# Patient Record
Sex: FEMALE | Race: WHITE | HISPANIC OR LATINO | Employment: PART TIME | URBAN - METROPOLITAN AREA
[De-identification: names, ages, dates, MRNs, and addresses within clinical notes are randomized per-mention and may not be internally consistent; named-entity substitution may affect disease eponyms.]

---

## 2017-02-06 ENCOUNTER — NON-PROVIDER VISIT (OUTPATIENT)
Dept: OBGYN | Facility: CLINIC | Age: 42
End: 2017-02-06

## 2017-02-06 DIAGNOSIS — Z32.01 PREGNANCY EXAMINATION OR TEST, POSITIVE RESULT: ICD-10-CM

## 2017-02-06 LAB
INT CON NEG: NEGATIVE
INT CON POS: POSITIVE
POC URINE PREGNANCY TEST: POSITIVE

## 2017-02-06 PROCEDURE — 81025 URINE PREGNANCY TEST: CPT | Performed by: OBSTETRICS & GYNECOLOGY

## 2017-02-14 ENCOUNTER — APPOINTMENT (OUTPATIENT)
Dept: OBGYN | Facility: CLINIC | Age: 42
End: 2017-02-14

## 2017-03-15 ENCOUNTER — APPOINTMENT (OUTPATIENT)
Dept: LAB | Facility: MEDICAL CENTER | Age: 42
End: 2017-03-15
Attending: NURSE PRACTITIONER

## 2017-03-15 ENCOUNTER — HOSPITAL ENCOUNTER (OUTPATIENT)
Facility: MEDICAL CENTER | Age: 42
End: 2017-03-15
Attending: NURSE PRACTITIONER
Payer: COMMERCIAL

## 2017-03-15 ENCOUNTER — INITIAL PRENATAL (OUTPATIENT)
Dept: OBGYN | Facility: CLINIC | Age: 42
End: 2017-03-15

## 2017-03-15 VITALS
DIASTOLIC BLOOD PRESSURE: 72 MMHG | HEIGHT: 62 IN | WEIGHT: 152 LBS | SYSTOLIC BLOOD PRESSURE: 140 MMHG | BODY MASS INDEX: 27.97 KG/M2

## 2017-03-15 DIAGNOSIS — Z34.82 PRENATAL CARE, SUBSEQUENT PREGNANCY, SECOND TRIMESTER: ICD-10-CM

## 2017-03-15 DIAGNOSIS — O09.522 ELDERLY MULTIGRAVIDA IN SECOND TRIMESTER: ICD-10-CM

## 2017-03-15 DIAGNOSIS — O09.292 HISTORY OF MACROSOMIA IN INFANT IN PRIOR PREGNANCY, CURRENTLY PREGNANT IN SECOND TRIMESTER: ICD-10-CM

## 2017-03-15 DIAGNOSIS — G43.009 MIGRAINE WITHOUT AURA AND WITHOUT STATUS MIGRAINOSUS, NOT INTRACTABLE: ICD-10-CM

## 2017-03-15 DIAGNOSIS — R32 INCONTINENCE: ICD-10-CM

## 2017-03-15 LAB
APPEARANCE UR: NORMAL
BILIRUB UR STRIP-MCNC: NORMAL MG/DL
COLOR UR AUTO: NORMAL
GLUCOSE UR STRIP.AUTO-MCNC: NORMAL MG/DL
KETONES UR STRIP.AUTO-MCNC: NORMAL MG/DL
LEUKOCYTE ESTERASE UR QL STRIP.AUTO: NORMAL
NITRITE UR QL STRIP.AUTO: NORMAL
PH UR STRIP.AUTO: 8 [PH] (ref 5–8)
PROT UR QL STRIP: NORMAL MG/DL
RBC UR QL AUTO: NORMAL
SP GR UR STRIP.AUTO: 1
UROBILINOGEN UR STRIP-MCNC: NORMAL MG/DL

## 2017-03-15 PROCEDURE — 8199 PREG CTR HIGH RISK PKG RATE (SYSTEM): Performed by: NURSE PRACTITIONER

## 2017-03-15 PROCEDURE — 59402 PR NEW OB HIGH RISK: CPT | Performed by: NURSE PRACTITIONER

## 2017-03-15 PROCEDURE — 81002 URINALYSIS NONAUTO W/O SCOPE: CPT | Performed by: NURSE PRACTITIONER

## 2017-03-15 NOTE — MR AVS SNAPSHOT
"Amada Nicoleuz ToddWilson   3/15/2017 1:30 PM   Initial Prenatal   MRN: [id number]    [facility]   Dept Phone:  [phone]    Description:  Female : 1975   Provider:  Eveline Barrett D.N.P.; [facility] INTAKE           Allergies as of 3/15/2017     Not on File      You were diagnosed with     Prenatal care, subsequent pregnancy, second trimester   [700550]       Elderly multigravida in second trimester   [8181056]       History of macrosomia in infant in prior pregnancy, currently pregnant in second trimester   [9260900]       Incontinence   [042908]       Migraine without aura and without status migrainosus, not intractable   [105975]       Elevated BP   [465968]         Vital Signs     Blood Pressure Height Weight Body Mass Index Last Menstrual Period Smoking Status    140/72 mmHg 1.562 m (5' 1.5\") 68.947 kg (152 lb) 28.26 kg/m2 2016 (Exact Date) Never Smoker       Basic Information     Date Of Birth Sex Race Ethnicity Preferred Language    1975 Female Unable to Obtain Unknown Vincentian      Your appointments     2017  2:00 PM   OB Follow Up with Eveline Barrett D.N.P.   [facility] [address] [address] [phone]              Problem List              ICD-10-CM Priority Class Noted - Resolved    Elderly multigravida in second trimester O09.522   3/15/2017 - Present    History of macrosomia in infant in prior pregnancy, currently pregnant in second trimester O09.292   3/15/2017 - Present    Incontinence R32   3/15/2017 - Present    Migraine without aura G43.009   3/15/2017 - Present      Health Maintenance     Patient has no pending health maintenance at this time      Results     POCT Urinalysis      Component Value Standard Range & Units    POC Color  Negative    POC Appearance  Negative    POC Leukocyte Esterase neg Negative    POC Nitrites neg Negative    POC Urobiligen  Negative (0.2) mg/dL    POC Protein neg " Negative mg/dL    POC Urine PH 8.0 5.0 - 8.0    POC Blood neg Negative    POC Specific Gravity 1.005 <1.005 - >1.030    POC Ketones small Negative mg/dL    POC Biliruben  Negative mg/dL    POC Glucose neg Negative mg/dL                        Current Immunizations     No immunizations on file.      Below and/or attached are the medications your provider expects you to take. Review all of your home medications and newly ordered medications with your provider and/or pharmacist. Follow medication instructions as directed by your provider and/or pharmacist. Please keep your medication list with you and share with your provider. Update the information when medications are discontinued, doses are changed, or new medications (including over-the-counter products) are added; and carry medication information at all times in the event of emergency situations     Allergies:  No Known Allergies          Medications  Valid as of: March 15, 2017 -  2:27 PM    Generic Name Brand Name Tablet Size Instructions for use    albuterol (PROVENTIL) 2.5 mg/0.5 mL Nebu Soln (Nebu Soln) PROVENTIL 2.5 mg/0.5 mL by Nebulization route ONCE (RT).        Prenatal Multivit-Min-Fe-FA   Take  by mouth.        .                 Medicines prescribed today were sent to:     [id number] - [place] - [address]    [address] [address]    Phone: [phone] Fax: [phone]    Open 24 Hours?: No      Medication refill instructions:       If your prescription bottle indicates you have medication refills left, it is not necessary to call your provider’s office. Please contact your pharmacy and they will refill your medication.    If your prescription bottle indicates you do not have any refills left, you may request refills at any time through one of the following ways: The online [employer] system (except Urgent Care), by calling your provider’s office, or by asking your pharmacy to contact your provider’s office with a refill request.  Medication refills are processed only during regular business hours and may not be available until the next business day. Your provider may request additional information or to have a follow-up visit with you prior to refilling your medication.   *Please Note: Medication refills are assigned a new Rx number when refilled electronically. Your pharmacy may indicate that no refills were authorized even though a new prescription for the same medication is available at the pharmacy. Please request the medicine by name with the pharmacy before contacting your provider for a refill.        Your To Do List     Future Labs/Procedures Complete By Expires    AFP TETRA  As directed 3/15/2018    GLUCOSE 1HR GESTATIONAL  As directed 3/15/2018    PREG CNTR PRENATAL PN  As directed 3/15/2018    PREGNANCY INDUCED HYPERTENSION PROFILE-CBC W/O, BUN, CREATININE, AST, URIC ACID  As directed 3/15/2018    THINPREP RFLX HPV ASCUS W/CTNG  As directed 3/15/2018    URINETOTAL PROTEIN 24 HR  As directed 3/15/2018    US-OB 2ND 3RD TRI COMPLETE  As directed 3/15/2018         MyChart Status: Patient Declined

## 2017-03-15 NOTE — Clinical Note
March 15, 2017              Amada Meneses is currently being cared for at [facility].  Her hours/activities need to be modified.  She should not lift over 20 pounds repetitively. She must have frequent rest periods and be able to drink water to stay hydrated.           Thank you,          Eveline Barrett D.N.P.    Electronically Signed

## 2017-03-15 NOTE — PROGRESS NOTES
S:  Amada Meneses is a 42 y.o.  who presents for her new OB exam.  She is 17w4d with and RAMEZ of Estimated Date of Delivery: 17 by LMP. She c/o very embarrassing leaking of urine when she coughs/sneezes or lifts anything. Has never sought care for it. She states she has migraines but did not know if tylenol was safe. She has an albuterol inhaler she got from [facility], unclear if she has allergies or asthma.  She is currently working as a  and wishes for a work letter for no heavy lifting. No ER visits or previous care in this pregnancy.     desires AFP.  declines CF.  Denies VB, LOF, or cramping.  Denies dysuria, vaginal DC. Reports positive fetal movement.     Pt is single and lives with Fob.  Pregnancy is desired.      Past Medical History   Diagnosis Date   • Asthma 2016     albuterol inhaler 2 times a day   • Migraine      no medications     History reviewed. No pertinent family history.  Social History     Social History   • Marital Status: Single     Spouse Name: N/A   • Number of Children: N/A   • Years of Education: N/A     Occupational History   • Not on file.     Social History Main Topics   • Smoking status: Never Smoker    • Smokeless tobacco: Never Used   • Alcohol Use: No   • Drug Use: No   • Sexual Activity:     Partners: Male      Comment: none. Planned pregnancy.      Other Topics Concern   • Not on file     Social History Narrative   • No narrative on file     OB History    Para Term  AB SAB TAB Ectopic Multiple Living   5 3 3  1 1    2      # Outcome Date GA Lbr Shaun/2nd Weight Sex Delivery Anes PTL Lv   5 Current            4 Term 10/29/11 40w0d  2.268 kg (5 lb) F Vag-Spont None N    3 SAB 2010 12w0d             Comments: D&C   2 Term 01 40w0d  3.175 kg (7 lb) M Vag-Spont None N Y   1 Term 96 40w0d  4.082 kg (9 lb) F Vag-Spont EPI N Y        History of HSV I or II in self or partner: no  History of Thyroid problems: no    O:   "  Filed Vitals:    03/15/17 1354   BP: 140/72   Height: 1.562 m (5' 1.5\")   Weight: 68.947 kg (152 lb)      See Prenatal Physical.    Wet mount: none      A:   1.  IUP @ 17w4d per LMP        2.  S=D        3.  See problem list below        4.  Elevated BP       Patient Active Problem List    Diagnosis Date Noted   • Elderly multigravida in second trimester 03/15/2017   • History of macrosomia in infant in prior pregnancy, currently pregnant in second trimester 03/15/2017   • Incontinence 03/15/2017   • Migraine without aura 03/15/2017         P:  1.  GC/CT & pap done        2.  Prenatal labs ordered - lab slip given        3.  Discussed PNV, diet, avoidances and adequate water intake        4.  NOB packet given        5.  Return to office in 3 wks        6.  Complete OB US first available.         7.  Include labs for elevated BP.         8.  Kaegels untill after birth then gyn referral for incontinence.         9.  Early glucose for macrosomia    No orders of the defined types were placed in this encounter.       "

## 2017-03-15 NOTE — PROGRESS NOTES
Pt here for NOB visit  C/o constant headaches states she drinks about 4 bottles of water a day because she thinks her bladder is too low because she pees her self. Denies any other complaints  Pt was told in [facility] she has asthma was given albuterol inhaler but is unsure if it is safe for baby.  Desires AFP  Declines CF  Desires BTL  Pharmacy verified  Phone [phone]

## 2017-03-16 LAB
C TRACH DNA GENITAL QL NAA+PROBE: NEGATIVE
CYTOLOGY REG CYTOL: NORMAL
N GONORRHOEA DNA GENITAL QL NAA+PROBE: NEGATIVE
SPECIMEN SOURCE: NORMAL

## 2017-03-17 ENCOUNTER — HOSPITAL ENCOUNTER (OUTPATIENT)
Dept: LAB | Facility: MEDICAL CENTER | Age: 42
End: 2017-03-17
Attending: NURSE PRACTITIONER
Payer: COMMERCIAL

## 2017-03-17 DIAGNOSIS — Z34.82 PRENATAL CARE, SUBSEQUENT PREGNANCY, SECOND TRIMESTER: ICD-10-CM

## 2017-03-17 DIAGNOSIS — O09.292 HISTORY OF MACROSOMIA IN INFANT IN PRIOR PREGNANCY, CURRENTLY PREGNANT IN SECOND TRIMESTER: ICD-10-CM

## 2017-03-17 LAB
ABO GROUP BLD: NORMAL
AMORPHOUS CRYSTALS 1764: PRESENT /HPF
APPEARANCE UR: ABNORMAL
AST SERPL-CCNC: 27 U/L (ref 12–45)
BASOPHILS # BLD AUTO: 0.03 K/UL (ref 0–0.12)
BASOPHILS NFR BLD AUTO: 0.3 % (ref 0–1.8)
BILIRUB UR QL STRIP.AUTO: NEGATIVE
BLD GP AB SCN SERPL QL: NORMAL
BUN SERPL-MCNC: 7 MG/DL (ref 8–22)
COLOR UR AUTO: YELLOW
CREAT SERPL-MCNC: 0.69 MG/DL (ref 0.5–1.4)
CULTURE IF INDICATED INDCX: NO UA CULTURE
EOSINOPHIL # BLD: 0.12 K/UL (ref 0–0.51)
EOSINOPHIL NFR BLD AUTO: 1.4 % (ref 0–6.9)
EPITHELIAL CELLS 1715: ABNORMAL /HPF
ERYTHROCYTE [DISTWIDTH] IN BLOOD BY AUTOMATED COUNT: 45.1 FL (ref 35.9–50)
ERYTHROCYTE [DISTWIDTH] IN BLOOD BY AUTOMATED COUNT: 46.1 FL (ref 35.9–50)
GLUCOSE 1H P 50 G GLC PO SERPL-MCNC: 129 MG/DL (ref 70–139)
GLUCOSE UR STRIP.AUTO-MCNC: NEGATIVE MG/DL
HBV SURFACE AG SERPL QL IA: NEGATIVE
HCT VFR BLD AUTO: 35.8 % (ref 37–47)
HCT VFR BLD AUTO: 36.2 % (ref 37–47)
HGB BLD-MCNC: 12 G/DL (ref 12–16)
HGB BLD-MCNC: 12 G/DL (ref 12–16)
HIV 1+2 AB+HIV1 P24 AG SERPL QL IA: NON REACTIVE
IMM GRANULOCYTES # BLD AUTO: 0.12 K/UL (ref 0–0.11)
IMM GRANULOCYTES NFR BLD AUTO: 1.4 % (ref 0–0.9)
KETONES UR STRIP.AUTO-MCNC: NEGATIVE MG/DL
LEUKOCYTE ESTERASE UR QL STRIP.AUTO: NEGATIVE
LYMPHOCYTES # BLD: 1.45 K/UL (ref 1–4.8)
LYMPHOCYTES NFR BLD AUTO: 16.9 % (ref 22–41)
MCH RBC QN AUTO: 32 PG (ref 27–33)
MCH RBC QN AUTO: 32.3 PG (ref 27–33)
MCHC RBC AUTO-ENTMCNC: 33.1 G/DL (ref 33.6–35)
MCHC RBC AUTO-ENTMCNC: 33.5 G/DL (ref 33.6–35)
MCV RBC AUTO: 96.5 FL (ref 81.4–97.8)
MCV RBC AUTO: 96.5 FL (ref 81.4–97.8)
MICRO URNS: ABNORMAL
MONOCYTES # BLD: 0.48 K/UL (ref 0–0.85)
MONOCYTES NFR BLD AUTO: 5.6 % (ref 0–13.4)
MUCOUS THREADS URNS QL MICRO: ABNORMAL /HPF
NEUTROPHILS # BLD: 6.38 K/UL (ref 2–7.15)
NEUTROPHILS NFR BLD AUTO: 74.4 % (ref 44–72)
NITRITE UR QL STRIP.AUTO: NEGATIVE
NRBC # BLD AUTO: 0 K/UL
NRBC BLD-RTO: 0 /100 WBC
PH UR: 6.5 [PH]
PLATELET # BLD AUTO: 244 K/UL (ref 164–446)
PLATELET # BLD AUTO: 251 K/UL (ref 164–446)
PMV BLD AUTO: 10 FL (ref 9–12.9)
PMV BLD AUTO: 10.1 FL (ref 9–12.9)
PROT 24H UR-MRATE: 7 MG/DL (ref 0–15)
PROT 24H UR-MRATE: 99.8 MG/24 HR (ref 30–150)
PROT UR QL STRIP: NEGATIVE MG/DL
RBC # BLD AUTO: 3.71 M/UL (ref 4.2–5.4)
RBC # BLD AUTO: 3.75 M/UL (ref 4.2–5.4)
RBC #/AREA URNS HPF: ABNORMAL /HPF
RBC UR QL AUTO: NEGATIVE
RH BLD: NORMAL
RUBV IGG SERPL IA-ACNC: 246.9 IU/ML
SP GR UR STRIP.AUTO: 1.02
TREPONEMA PALLIDUM IGG+IGM AB [PRESENCE] IN SERUM OR PLASMA BY IMMUNOASSAY: NON REACTIVE
URATE SERPL-MCNC: 4.1 MG/DL (ref 1.9–8.2)
URINE VOLUMNE 8233: 1425 ML
WBC # BLD AUTO: 8.3 K/UL (ref 4.8–10.8)
WBC # BLD AUTO: 8.6 K/UL (ref 4.8–10.8)

## 2017-03-20 LAB
# FETUSES US: NORMAL
AFP MOM SERPL: 1.19
AFP SERPL-MCNC: 50 NG/ML
AGE - REPORTED: 40.5 YR
GA METHOD: NORMAL
GA: 17.86 WEEKS
HCG MOM SERPL: 0.69
HCG SERPL-ACNC: NORMAL IU/L
IDDM PATIENT QL: NO
INHIBIN A MOM SERPL: 0.77
INHIBIN A SERPL-MCNC: 121 PG/ML
INTEGRATED SCN PATIENT-IMP: NORMAL
PATHOLOGY STUDY: NORMAL
U ESTRIOL MOM SERPL: 1.3
U ESTRIOL SERPL-MCNC: 1.84 NG/ML

## 2017-04-07 ENCOUNTER — APPOINTMENT (OUTPATIENT)
Dept: RADIOLOGY | Facility: IMAGING CENTER | Age: 42
End: 2017-04-07
Attending: NURSE PRACTITIONER

## 2017-04-07 DIAGNOSIS — Z34.82 PRENATAL CARE, SUBSEQUENT PREGNANCY, SECOND TRIMESTER: ICD-10-CM

## 2017-04-07 PROCEDURE — 76805 OB US >/= 14 WKS SNGL FETUS: CPT | Mod: 26 | Performed by: OBSTETRICS & GYNECOLOGY

## 2017-04-10 ENCOUNTER — DATING (OUTPATIENT)
Dept: OBGYN | Facility: CLINIC | Age: 42
End: 2017-04-10

## 2017-04-12 ENCOUNTER — ROUTINE PRENATAL (OUTPATIENT)
Dept: OBGYN | Facility: CLINIC | Age: 42
End: 2017-04-12

## 2017-04-12 VITALS — DIASTOLIC BLOOD PRESSURE: 74 MMHG | SYSTOLIC BLOOD PRESSURE: 120 MMHG | BODY MASS INDEX: 29.37 KG/M2 | WEIGHT: 158 LBS

## 2017-04-12 DIAGNOSIS — O09.522 ELDERLY MULTIGRAVIDA IN SECOND TRIMESTER: ICD-10-CM

## 2017-04-12 PROCEDURE — 90040 PR PRENATAL FOLLOW UP: CPT | Performed by: NURSE PRACTITIONER

## 2017-04-12 NOTE — PROGRESS NOTES
S) Pt is a 42 y.o.   at 21w4d  gestation. Routine prenatal care today. States no problems today.  Reports positive  fetal movement. Denies cramping, bleeding or leaking of fluid. Denies dysuria. Generally feels well today. Good self-care activities identified. Denies headaches.     O) see flow         Filed Vitals:    17 1401   BP: 120/74   Weight: 71.668 kg (158 lb)           Lab: normal prenatal panel, normal early glucose. Normal AFP       Pertinent ultrasound - normal fetal survey            A) IUP at 21w4d       S=D         Patient Active Problem List    Diagnosis Date Noted   • Elderly multigravida in second trimester 03/15/2017   • History of macrosomia in infant in prior pregnancy, currently pregnant in second trimester 03/15/2017   • Incontinence 03/15/2017   • Migraine without aura 03/15/2017       P) s/s ptl vs general discomforts. Fetal movements reviewed. General ed and anticipatory guidance. Nutrition/exercise/vitamin. Plans breast. Continue PNV.

## 2017-04-12 NOTE — PROGRESS NOTES
Pt here today for OB follow up  Pt states having acid reflux  Reports +FM  WT:158lb  BP: 120/74  Good # [phone]

## 2017-04-12 NOTE — MR AVS SNAPSHOT
Amadakimberly Nicoleuz Gideon   2017 2:00 PM   Routine Prenatal   MRN: [id number]    [facility]   Dept Phone:  [phone]    Description:  Female : 1975   Provider:  Eveline Barrett D.N.P.           Allergies as of 2017     Not on File      Vital Signs     Blood Pressure Weight Last Menstrual Period Smoking Status          120/74 mmHg 71.668 kg (158 lb) 2016 (Exact Date) Never Smoker         Basic Information     Date Of Birth Sex Race Ethnicity Preferred Language    1975 Female Unable to Obtain Unknown Syrian      Problem List              ICD-10-CM Priority Class Noted - Resolved    Elderly multigravida in second trimester O09.522   3/15/2017 - Present    History of macrosomia in infant in prior pregnancy, currently pregnant in second trimester O09.292   3/15/2017 - Present    Incontinence R32   3/15/2017 - Present    Migraine without aura G43.009   3/15/2017 - Present      Health Maintenance        Date Due Completion Dates    IMM DTaP/Tdap/Td Vaccine (1 - Tdap) 1996 ---    MAMMOGRAM 2017 ---    PAP SMEAR 3/15/2020 3/15/2017            Current Immunizations     No immunizations on file.      Below and/or attached are the medications your provider expects you to take. Review all of your home medications and newly ordered medications with your provider and/or pharmacist. Follow medication instructions as directed by your provider and/or pharmacist. Please keep your medication list with you and share with your provider. Update the information when medications are discontinued, doses are changed, or new medications (including over-the-counter products) are added; and carry medication information at all times in the event of emergency situations     Allergies:  No Known Allergies          Medications  Valid as of: 2017 -  2:12 PM    Generic Name Brand Name Tablet Size Instructions for use    albuterol (PROVENTIL) 2.5 mg/0.5 mL Nebu Soln (Nebu  Soln) PROVENTIL 2.5 mg/0.5 mL by Nebulization route ONCE (RT).        Prenatal Multivit-Min-Fe-FA   Take  by mouth.        .                 Medicines prescribed today were sent to:     [id number] - [place], [place] - [address]    [address] [place] [address]    Phone: [phone] Fax: [phone]    Open 24 Hours?: No      Medication refill instructions:       If your prescription bottle indicates you have medication refills left, it is not necessary to call your provider’s office. Please contact your pharmacy and they will refill your medication.    If your prescription bottle indicates you do not have any refills left, you may request refills at any time through one of the following ways: The online [employer] system (except Urgent Care), by calling your provider’s office, or by asking your pharmacy to contact your provider’s office with a refill request. Medication refills are processed only during regular business hours and may not be available until the next business day. Your provider may request additional information or to have a follow-up visit with you prior to refilling your medication.   *Please Note: Medication refills are assigned a new Rx number when refilled electronically. Your pharmacy may indicate that no refills were authorized even though a new prescription for the same medication is available at the pharmacy. Please request the medicine by name with the pharmacy before contacting your provider for a refill.           MyChart Status: Patient Declined

## 2017-05-08 ENCOUNTER — ROUTINE PRENATAL (OUTPATIENT)
Dept: OBGYN | Facility: CLINIC | Age: 42
End: 2017-05-08

## 2017-05-08 VITALS — DIASTOLIC BLOOD PRESSURE: 80 MMHG | WEIGHT: 160 LBS | BODY MASS INDEX: 29.75 KG/M2 | SYSTOLIC BLOOD PRESSURE: 118 MMHG

## 2017-05-08 DIAGNOSIS — Z34.80 SUPERVISION OF OTHER NORMAL PREGNANCY, ANTEPARTUM: ICD-10-CM

## 2017-05-08 PROCEDURE — 90040 PR PRENATAL FOLLOW UP: CPT | Performed by: NURSE PRACTITIONER

## 2017-05-08 NOTE — MR AVS SNAPSHOT
Amada Cunningham ToddWilson   2017 3:00 PM   Routine Prenatal   MRN: [id number]    [facility]   Dept Phone:  [phone]    Description:  Female : 1975   Provider:  Eveline Barrett D.N.P.           Allergies as of 2017     Not on File      You were diagnosed with     Supervision of other normal pregnancy, antepartum   [4065250]         Vital Signs     Blood Pressure Weight Last Menstrual Period Smoking Status          118/80 mmHg 72.576 kg (160 lb) 2016 (Exact Date) Never Smoker         Basic Information     Date Of Birth Sex Race Ethnicity Preferred Language    1975 Female Unable to Obtain Unknown Honduran      Problem List              ICD-10-CM Priority Class Noted - Resolved    Elderly multigravida in second trimester O09.522   3/15/2017 - Present    History of macrosomia in infant in prior pregnancy, currently pregnant in second trimester O09.292   3/15/2017 - Present    Incontinence R32   3/15/2017 - Present    Migraine without aura G43.009   3/15/2017 - Present      Health Maintenance        Date Due Completion Dates    IMM DTaP/Tdap/Td Vaccine (1 - Tdap) 1996 ---    MAMMOGRAM 2017 ---    PAP SMEAR 3/15/2020 3/15/2017            Current Immunizations     No immunizations on file.      Below and/or attached are the medications your provider expects you to take. Review all of your home medications and newly ordered medications with your provider and/or pharmacist. Follow medication instructions as directed by your provider and/or pharmacist. Please keep your medication list with you and share with your provider. Update the information when medications are discontinued, doses are changed, or new medications (including over-the-counter products) are added; and carry medication information at all times in the event of emergency situations     Allergies:  No Known Allergies          Medications  Valid as of: May 08, 2017 -  3:17 PM    Generic Name Brand  Name Tablet Size Instructions for use    albuterol (PROVENTIL) 2.5 mg/0.5 mL Nebu Soln (Nebu Soln) PROVENTIL 2.5 mg/0.5 mL by Nebulization route ONCE (RT).        Prenatal Multivit-Min-Fe-FA   Take  by mouth.        .                 Medicines prescribed today were sent to:     [id number] - [place], [place] - [address] [facility]    [address] [place] [address]    Phone: [phone] Fax: [phone]    Open 24 Hours?: No      Medication refill instructions:       If your prescription bottle indicates you have medication refills left, it is not necessary to call your provider’s office. Please contact your pharmacy and they will refill your medication.    If your prescription bottle indicates you do not have any refills left, you may request refills at any time through one of the following ways: The online [employer] system (except Urgent Care), by calling your provider’s office, or by asking your pharmacy to contact your provider’s office with a refill request. Medication refills are processed only during regular business hours and may not be available until the next business day. Your provider may request additional information or to have a follow-up visit with you prior to refilling your medication.   *Please Note: Medication refills are assigned a new Rx number when refilled electronically. Your pharmacy may indicate that no refills were authorized even though a new prescription for the same medication is available at the pharmacy. Please request the medicine by name with the pharmacy before contacting your provider for a refill.        Your To Do List     Future Labs/Procedures Complete By Expires    GLUCOSE 1HR GESTATIONAL  As directed 5/8/2018    HCT  As directed 5/8/2018    HGB  As directed 5/8/2018    T.PALLIDUM AB EIA  As directed 5/9/2018         [employer] Status: Patient Declined

## 2017-05-08 NOTE — PROGRESS NOTES
S) Pt is a 42 y.o.   at 25w2d  gestation. Routine prenatal care today. Patient states she has scratchy throat and cough from allergies. Has tried alavert but not particularly helpful.  Also c/o fatigue. Taking a PNV.  Reports good  fetal movement. Denies cramping, bleeding or leaking of fluid. Denies dysuria. Denies headaches.     O) see flow         Filed Vitals:    17 1503   BP: 118/80   Weight: 72.576 kg (160 lb)           Lab: normal prenatal panel, normal early glucose. Normal AFP  Throat, perhaps slightly erythematic, no mucous or exudate noted       Pertinent ultrasound - Normal fetal survey            A) IUP at 25w2d       S=D         Patient Active Problem List    Diagnosis Date Noted   • Elderly multigravida in second trimester 03/15/2017   • History of macrosomia in infant in prior pregnancy, currently pregnant in second trimester 03/15/2017   • Incontinence 03/15/2017   • Migraine without aura 03/15/2017       P) s/s ptl vs general discomforts. Fetal movements reviewed. General ed and anticipatory guidance. Nutrition/exercise/vitamin. Plans breast. Continue PNV. OTC allergy and cough meds list given. Lab slip for timely glucola test given.

## 2017-05-08 NOTE — PROGRESS NOTES
OB f/u. + fetal movement.  No VB, LOF or UC's.  Wt:  160lb     BP:  118/80  Good phone #  [phone]  Preferred pharmacy confirmed.  3rd trimester lab orders pended and instructions given to patient

## 2017-05-26 ENCOUNTER — ROUTINE PRENATAL (OUTPATIENT)
Dept: OBGYN | Facility: CLINIC | Age: 42
End: 2017-05-26

## 2017-05-26 VITALS — SYSTOLIC BLOOD PRESSURE: 130 MMHG | DIASTOLIC BLOOD PRESSURE: 78 MMHG | WEIGHT: 162 LBS | BODY MASS INDEX: 30.12 KG/M2

## 2017-05-26 DIAGNOSIS — O16.3 ELEVATED BLOOD PRESSURE AFFECTING PREGNANCY IN THIRD TRIMESTER, ANTEPARTUM: ICD-10-CM

## 2017-05-26 LAB
APPEARANCE UR: NORMAL
BILIRUB UR STRIP-MCNC: NORMAL MG/DL
COLOR UR AUTO: NORMAL
GLUCOSE UR STRIP.AUTO-MCNC: NEGATIVE MG/DL
KETONES UR STRIP.AUTO-MCNC: NEGATIVE MG/DL
LEUKOCYTE ESTERASE UR QL STRIP.AUTO: NEGATIVE
NITRITE UR QL STRIP.AUTO: NEGATIVE
PH UR STRIP.AUTO: 7.5 [PH] (ref 5–8)
PROT UR QL STRIP: NEGATIVE MG/DL
RBC UR QL AUTO: NEGATIVE
SP GR UR STRIP.AUTO: 1
UROBILINOGEN UR STRIP-MCNC: NORMAL MG/DL

## 2017-05-26 PROCEDURE — 90040 PR PRENATAL FOLLOW UP: CPT | Performed by: NURSE PRACTITIONER

## 2017-05-26 PROCEDURE — 81002 URINALYSIS NONAUTO W/O SCOPE: CPT | Performed by: NURSE PRACTITIONER

## 2017-05-26 NOTE — Clinical Note
Cuente los Movimientos de alonzo Bebé  Otro paso importante para la fawn de alonzo bebé    [facility]            Dept: [phone]    ¿Cuántas semanas tiene de embarazo? 27w6d    Fecha cuando tiene que comenzar a contar el movimiento: 5-26-17                  [place] debe usar yaa diagrama    Aislinn manera en que alonzo doctor puede controlar a fawn de alonzo bebé es sabiendo cuantas veces se mueve alonzo bebé en el útero, o por medio de las “pataditas”.  Usted podrá ayudarle a alonzo médico al usar cada día el siguiente diagrama.    Cada día, usted debe prestar atención a cuantas horas le lleva a alonzo bebé moverse 10 veces.  Comience a contar en la mañana, lo antes posible después de haberse levantado.    · Primeramente, escriba la hora en que se mueve alonzo bebé, hasta llegar a 10 veces.  · Colóquele un check o palomita a cada cuadrito cada vez que alonzo bebé se mueva hasta que complete 10 veces.  · Escriba la hora cuando termine de contar 10 veces en la última columna.  · Sume el total del tiempo que le llevó contar los 10 movimientos.  · Finalmente, complete el cuadrito de cuantas horas le llevó hacerlo.    Después de shanika contado los 10 movimientos, ya no tendrá que contar los demás movimientos por el juanita del día.  A la mañana siguiente, comience a contar de nuevo cuantas veces se mueve el bebé desde el momento en que se levante.    ¿Qué tendría que considerarse un “movimiento”?  Es difícil de decirlo porque es distinto de aislinn madre a otra, y de un embarazo a otro.  Lo importante es que cuente el movimiento de la misma manera laura el transcurso de alonzo embarazo.  Si tiene preguntas adicionales, pregúntele a alonzo doctor.    ¡Cuente cuidadosamente cada día!     MUESTRA:  Semana 28    ¿Cuántas horas le ha llevado sentir 10 movimientos?        Hora de Inicio     1     2     3     4     5     6     7     8     9     10   Hora de Finlizar   Hany. 8:20 ·  ·  ·  ·  ·  ·  ·  ·  ·  ·  11:40   Mar.                Mié.               Jue.               Vie.               Sáb.               Dom.                 IMPORTANTE:  Usted debe contactar a alonzo doctor si le lleva más de 2 horas sentir 10 movimientos de alonzo bebé.    Cada mañana, escriba la hora de inicio y comience a contar los movimientos de alonzo bebé.  Hágalo colocándole un check o palomita a cada cuadrito cada vez que sienta un movimiento de alonzo bebé.  Cuando haya sentido 10 “pataditas”, escriba la hora en que terminó de contar en la última columna.  Luego, complete en la cajita (arriba de la xu de check o palomita) el número total de horas que le llevó hacerlo.  Asegúrese de leer completamente las instrucciones en la página anterior.

## 2017-05-26 NOTE — MR AVS SNAPSHOT
Amada Meneses   2017 3:15 PM   Routine Prenatal   MRN: [id number]    [facility]   Dept Phone:  [phone]    Description:  Female : 1975   Provider:  Eveline Barrett D.N.P.           Allergies as of 2017     Not on File      You were diagnosed with     Elevated blood pressure affecting pregnancy in third trimester, antepartum   [[id number]]         Vital Signs     Blood Pressure Weight Last Menstrual Period Smoking Status          130/78 mmHg 73.483 kg (162 lb) 2016 (Exact Date) Never Smoker         Basic Information     Date Of Birth Sex Race Ethnicity Preferred Language    1975 Female Unable to Obtain Unknown Persian      Your appointments     2017  3:00 PM   OB Follow Up with GILBERTO Johnson   [facility] [address] [id number] [phone]              Problem List              ICD-10-CM Priority Class Noted - Resolved    Elderly multigravida in second trimester O09.522   3/15/2017 - Present    History of macrosomia in infant in prior pregnancy, currently pregnant in second trimester O09.292   3/15/2017 - Present    Incontinence R32   3/15/2017 - Present    Migraine without aura G43.009   3/15/2017 - Present      Health Maintenance        Date Due Completion Dates    IMM DTaP/Tdap/Td Vaccine (1 - Tdap) 1996 ---    MAMMOGRAM 2017 ---    PAP SMEAR 3/15/2020 3/15/2017            Results     POCT Urinalysis      Component Value Standard Range & Units    POC Color  Negative    POC Appearance  Negative    POC Leukocyte Esterase negative Negative    POC Nitrites negative Negative    POC Urobiligen  Negative (0.2) mg/dL    POC Protein negative Negative mg/dL    POC Urine PH 7.5 5.0 - 8.0    POC Blood negative Negative    POC Specific Gravity 1.005 <1.005 - >1.030    POC Ketones negative Negative mg/dL    POC Biliruben  Negative mg/dL    POC Glucose negative Negative mg/dL                           Current Immunizations     No immunizations on file.      Below and/or attached are the medications your provider expects you to take. Review all of your home medications and newly ordered medications with your provider and/or pharmacist. Follow medication instructions as directed by your provider and/or pharmacist. Please keep your medication list with you and share with your provider. Update the information when medications are discontinued, doses are changed, or new medications (including over-the-counter products) are added; and carry medication information at all times in the event of emergency situations     Allergies:  No Known Allergies          Medications  Valid as of: May 26, 2017 -  3:42 PM    Generic Name Brand Name Tablet Size Instructions for use    albuterol (PROVENTIL) 2.5 mg/0.5 mL Nebu Soln (Nebu Soln) PROVENTIL 2.5 mg/0.5 mL by Nebulization route ONCE (RT).        Prenatal Multivit-Min-Fe-FA   Take  by mouth.        .                 Medicines prescribed today were sent to:     [id number] - [place], [place] - [address]    [address] [place] [address]    Phone: [phone] Fax: [phone]    Open 24 Hours?: No      Medication refill instructions:       If your prescription bottle indicates you have medication refills left, it is not necessary to call your provider’s office. Please contact your pharmacy and they will refill your medication.    If your prescription bottle indicates you do not have any refills left, you may request refills at any time through one of the following ways: The online [employer] system (except Urgent Care), by calling your provider’s office, or by asking your pharmacy to contact your provider’s office with a refill request. Medication refills are processed only during regular business hours and may not be available until the next business day. Your provider may request additional information or to have a follow-up visit with you prior to refilling your  medication.   *Please Note: Medication refills are assigned a new Rx number when refilled electronically. Your pharmacy may indicate that no refills were authorized even though a new prescription for the same medication is available at the pharmacy. Please request the medicine by name with the pharmacy before contacting your provider for a refill.           MyChart Status: Patient Declined

## 2017-05-26 NOTE — PROGRESS NOTES
OB f/u. + fetal movement.  No VB, LOF or UC's.  Good phone # [phone]  Preferred pharmacy confirmed.  Pt c/o HA for a week along with pain on L side of face, has taken tylenol but no relief  Kick count sheet given today.  Desires BTL- consent signed  Offered TDap but patient has has had a cold and cough for 2 weeks

## 2017-05-26 NOTE — PROGRESS NOTES
S) Pt is a 42 y.o.   at 27w6d  gestation. Routine prenatal care today. Patient states had had URI for 2 weeks and headache.  Reports good  fetal movement. Denies cramping, bleeding or leaking of fluid. Denies dysuria. Good self-care activities identified. Denies headaches.     O) see flow         Filed Vitals:    17 1519 17 1535   BP: 138/82 130/78   Weight: 73.483 kg (162 lb)            Lab:  Recent Results (from the past 336 hour(s))   POCT Urinalysis    Collection Time: 17  3:27 PM   Result Value Ref Range    POC Color  Negative    POC Appearance  Negative    POC Leukocyte Esterase negative Negative    POC Nitrites negative Negative    POC Urobiligen  Negative (0.2) mg/dL    POC Protein negative Negative mg/dL    POC Urine PH 7.5 5.0 - 8.0    POC Blood negative Negative    POC Specific Gravity 1.005 <1.005 - >1.030    POC Ketones negative Negative mg/dL    POC Biliruben  Negative mg/dL    POC Glucose negative Negative mg/dL          Pertinent ultrasound - normal fetal survey            A) IUP at 27w6d       S=D  Slightly elevated bp today with no proteinuria         Patient Active Problem List    Diagnosis Date Noted   • Elderly multigravida in second trimester 03/15/2017   • History of macrosomia in infant in prior pregnancy, currently pregnant in second trimester 03/15/2017   • Incontinence 03/15/2017   • Migraine without aura 03/15/2017       P) s/s ptl vs general discomforts. Fetal movements reviewed. General ed and anticipatory guidance. Nutrition/exercise/vitamin. Plans breast. Continue PNV. Continue to monitor BP. Persistent headache is likely from sinusitis.

## 2017-06-09 ENCOUNTER — ROUTINE PRENATAL (OUTPATIENT)
Dept: OBGYN | Facility: CLINIC | Age: 42
End: 2017-06-09

## 2017-06-09 VITALS — SYSTOLIC BLOOD PRESSURE: 126 MMHG | DIASTOLIC BLOOD PRESSURE: 82 MMHG | WEIGHT: 162 LBS | BODY MASS INDEX: 30.12 KG/M2

## 2017-06-09 DIAGNOSIS — O09.92 ENCOUNTER FOR SUPERVISION OF HIGH RISK PREGNANCY IN SECOND TRIMESTER, ANTEPARTUM: Primary | ICD-10-CM

## 2017-06-09 DIAGNOSIS — G43.009 MIGRAINE WITHOUT AURA AND WITHOUT STATUS MIGRAINOSUS, NOT INTRACTABLE: ICD-10-CM

## 2017-06-09 PROCEDURE — 90040 PR PRENATAL FOLLOW UP: CPT | Performed by: NURSE PRACTITIONER

## 2017-06-09 PROCEDURE — 90471 IMMUNIZATION ADMIN: CPT | Performed by: NURSE PRACTITIONER

## 2017-06-09 PROCEDURE — 90715 TDAP VACCINE 7 YRS/> IM: CPT | Performed by: NURSE PRACTITIONER

## 2017-06-09 RX ORDER — BUTALBITAL, ACETAMINOPHEN AND CAFFEINE 50; 325; 40 MG/1; MG/1; MG/1
1 TABLET ORAL EVERY 4 HOURS PRN
Qty: 30 TAB | Refills: 0 | Status: ON HOLD | OUTPATIENT
Start: 2017-06-09 | End: 2017-08-24

## 2017-06-09 NOTE — PROGRESS NOTES
OB Follow Up  Pt c/o headache.   1 hr Glucose lab work not done yet, pt states she will get it done before her next appt.   Kick count sheet given to pt today and instructions given   T-Dap vaccine today.   Good phone ocquaa-[phone]

## 2017-06-09 NOTE — PROGRESS NOTES
S:  Pt is  at 29w6d for routine OB follow up.  Reports migraine headache today-sensitive to light and unable to move her head quickly without pain.  Reports good FM.  Denies VB, LOF, RUCs or vaginal DC.    O:  Please see above vitals.        FHTs: 145        Fundal ht: 29 cm.        S=D           A:  IUP at 29w6d  Patient Active Problem List    Diagnosis Date Noted   • Elderly multigravida in second trimester 03/15/2017   • History of macrosomia in infant in prior pregnancy, currently pregnant in second trimester 03/15/2017   • Incontinence 03/15/2017   • Migraine without aura 03/15/2017        P:  1.  Desires BTL.          2.  Instructions given on FKCs.          3.  Questions answered.          4.  Encouraged pt to tour L&D.          5.  Encourage adequate water intake.        6.   labor precautions reviewed.         7.  F/u 2 wks.        8.  TDap today.        9.  Rx for fiorocet

## 2017-06-09 NOTE — PATIENT INSTRUCTIONS
1.  Desires BTL.          2.  Instructions given on FKCs.          3.  Questions answered.          4.  Encouraged pt to tour L&D.          5.  Encourage adequate water intake.        6.   labor precautions reviewed.         7.  F/u 2 wks.        8.  TDap today.        9.  Rx for fiorocet

## 2017-06-09 NOTE — MR AVS SNAPSHOT
Amada Nicoleuz Gideon   2017 3:00 PM   Routine Prenatal   MRN: [id number]    [facility]   Dept Phone:  [phone]    Description:  Female : 1975   Provider:  STEFF Anglin           Allergies as of 2017     Not on File      You were diagnosed with     Encounter for supervision of high risk pregnancy in second trimester, antepartum   [[id number]]  -  Primary     Migraine without aura and without status migrainosus, not intractable   [[id number]]         Vital Signs     Blood Pressure Weight Last Menstrual Period Smoking Status          126/82 mmHg 73.483 kg (162 lb) 2016 (Exact Date) Never Smoker         Basic Information     Date Of Birth Sex Race Ethnicity Preferred Language    1975 Female Unable to Obtain Unknown Afghan      Problem List              ICD-10-CM Priority Class Noted - Resolved    Elderly multigravida in second trimester O09.522   3/15/2017 - Present    History of macrosomia in infant in prior pregnancy, currently pregnant in second trimester O09.292   3/15/2017 - Present    Incontinence R32   3/15/2017 - Present    Migraine without aura G43.009   3/15/2017 - Present      Health Maintenance        Date Due Completion Dates    IMM DTaP/Tdap/Td Vaccine (1 - Tdap) 1996 ---    MAMMOGRAM 2017 ---    PAP SMEAR 3/15/2020 3/15/2017            Current Immunizations     Tdap Vaccine 2017      Below and/or attached are the medications your provider expects you to take. Review all of your home medications and newly ordered medications with your provider and/or pharmacist. Follow medication instructions as directed by your provider and/or pharmacist. Please keep your medication list with you and share with your provider. Update the information when medications are discontinued, doses are changed, or new medications (including over-the-counter products) are added; and carry medication information at all times in the event of emergency  situations     Allergies:  No Known Allergies          Medications  Valid as of: June 09, 2017 -  3:56 PM    Generic Name Brand Name Tablet Size Instructions for use    albuterol (PROVENTIL) 2.5 mg/0.5 mL Nebu Soln (Nebu Soln) PROVENTIL 2.5 mg/0.5 mL by Nebulization route ONCE (RT).        Butalbital-APAP-Caffeine (Tab) FIORICET -40 MG Take 1 Tab by mouth every four hours as needed for Headache.        Prenatal Multivit-Min-Fe-FA   Take  by mouth.        .                 Medicines prescribed today were sent to:     [id number] - [place], [place] - [address]    [address] [place] [address]    Phone: [phone] Fax: [phone]    Open 24 Hours?: No      Medication refill instructions:       If your prescription bottle indicates you have medication refills left, it is not necessary to call your provider’s office. Please contact your pharmacy and they will refill your medication.    If your prescription bottle indicates you do not have any refills left, you may request refills at any time through one of the following ways: The online [employer] system (except Urgent Care), by calling your provider’s office, or by asking your pharmacy to contact your provider’s office with a refill request. Medication refills are processed only during regular business hours and may not be available until the next business day. Your provider may request additional information or to have a follow-up visit with you prior to refilling your medication.   *Please Note: Medication refills are assigned a new Rx number when refilled electronically. Your pharmacy may indicate that no refills were authorized even though a new prescription for the same medication is available at the pharmacy. Please request the medicine by name with the pharmacy before contacting your provider for a refill.        Instructions          1.  Desires BTL.          2.  Instructions given on FKCs.          3.  Questions answered.          4.  Encouraged pt to  tour L&D.          5.  Encourage adequate water intake.        6.   labor precautions reviewed.         7.  F/u 2 wks.        8.  TDap today.        9.  Rx for fiorocet              MyChart Status: Patient Declined

## 2017-06-23 ENCOUNTER — ROUTINE PRENATAL (OUTPATIENT)
Dept: OBGYN | Facility: CLINIC | Age: 42
End: 2017-06-23

## 2017-06-23 VITALS — SYSTOLIC BLOOD PRESSURE: 122 MMHG | BODY MASS INDEX: 30.49 KG/M2 | WEIGHT: 164 LBS | DIASTOLIC BLOOD PRESSURE: 70 MMHG

## 2017-06-23 DIAGNOSIS — O09.522 ELDERLY MULTIGRAVIDA IN SECOND TRIMESTER: Primary | ICD-10-CM

## 2017-06-23 PROCEDURE — 90040 PR PRENATAL FOLLOW UP: CPT | Performed by: PHYSICIAN ASSISTANT

## 2017-06-23 NOTE — MR AVS SNAPSHOT
Amadakimberly Nicoleuz Gideon   2017 2:00 PM   Routine Prenatal   MRN: [id number]    [facility]   Dept Phone:  [phone]    Description:  Female : 1975   Provider:  GEORGE Law           Allergies as of 2017     Not on File      Vital Signs     Blood Pressure Weight Last Menstrual Period Smoking Status          122/70 mmHg 74.39 kg (164 lb) 2016 (Exact Date) Never Smoker         Basic Information     Date Of Birth Sex Race Ethnicity Preferred Language    1975 Female Unable to Obtain Unknown Georgian      Your appointments     2017 10:15 AM   OB Follow Up with STEFF Anglin   [facility] ([facility])    [address] [place] [address] [address]  [facility] [address] [phone]              Problem List              ICD-10-CM Priority Class Noted - Resolved    Elderly multigravida in second trimester O09.522   3/15/2017 - Present    History of macrosomia in infant in prior pregnancy, currently pregnant in second trimester O09.292   3/15/2017 - Present    Incontinence R32   3/15/2017 - Present    Migraine without aura G43.009   3/15/2017 - Present      Health Maintenance        Date Due Completion Dates    MAMMOGRAM 2017 ---    PAP SMEAR 3/15/2020 3/15/2017    IMM DTaP/Tdap/Td Vaccine (2 - Td) 2027            Current Immunizations     Tdap Vaccine 2017      Below and/or attached are the medications your provider expects you to take. Review all of your home medications and newly ordered medications with your provider and/or pharmacist. Follow medication instructions as directed by your provider and/or pharmacist. Please keep your medication list with you and share with your provider. Update the information when medications are discontinued, doses are changed, or new medications (including over-the-counter products) are added; and carry medication information at all times in the event of emergency situations     Allergies:  No  Known Allergies          Medications  Valid as of: June 23, 2017 -  2:23 PM    Generic Name Brand Name Tablet Size Instructions for use    albuterol (PROVENTIL) 2.5 mg/0.5 mL Nebu Soln (Nebu Soln) PROVENTIL 2.5 mg/0.5 mL by Nebulization route ONCE (RT).        Butalbital-APAP-Caffeine (Tab) FIORICET -40 MG Take 1 Tab by mouth every four hours as needed for Headache.        Prenatal Multivit-Min-Fe-FA   Take  by mouth.        .                 Medicines prescribed today were sent to:     [id number] - [place], [place] - [address]    [address] [place] [address]    Phone: [phone] Fax: [phone]    Open 24 Hours?: No      Medication refill instructions:       If your prescription bottle indicates you have medication refills left, it is not necessary to call your provider’s office. Please contact your pharmacy and they will refill your medication.    If your prescription bottle indicates you do not have any refills left, you may request refills at any time through one of the following ways: The online [employer] system (except Urgent Care), by calling your provider’s office, or by asking your pharmacy to contact your provider’s office with a refill request. Medication refills are processed only during regular business hours and may not be available until the next business day. Your provider may request additional information or to have a follow-up visit with you prior to refilling your medication.   *Please Note: Medication refills are assigned a new Rx number when refilled electronically. Your pharmacy may indicate that no refills were authorized even though a new prescription for the same medication is available at the pharmacy. Please request the medicine by name with the pharmacy before contacting your provider for a refill.        Other Notes About Your Plan     GIRL - Bhakti           [employer] Status: Patient Declined

## 2017-06-23 NOTE — PROGRESS NOTES
Pt has no complaints with cramping, UCs, Vb, LOF, though pt has felt a lot of pressure in low abd and pain in ankles and knees. Likely due to multigravida status, so pt to try exercises and stretching for ankle pain, also laying back with feet elevated. Pt instructed to get Zantac 150mg BID for mild heartburn. Also, pt has had worsening HA, estelle after cutting back on soda the past month or so. Pt has tried Fioricet and Tylenol with little relief. Pt to try massage of shoulders, neck and sips of caffeine when HA occurs. Also, pt urged to get 1 gallon water in daily. +FM. Pt urged to get 1hr GTT labs done asap. RTC 2 wk or sooner prn.

## 2017-06-23 NOTE — PROGRESS NOTES
Ob F/U  +FM  Pt c/o headaches.   Good phone number-[phone]  1 hr glucose lab work not done yet, pt states she will try to get it done before her next appt.

## 2017-07-07 ENCOUNTER — ROUTINE PRENATAL (OUTPATIENT)
Dept: OBGYN | Facility: CLINIC | Age: 42
End: 2017-07-07

## 2017-07-07 ENCOUNTER — HOSPITAL ENCOUNTER (OUTPATIENT)
Dept: LAB | Facility: MEDICAL CENTER | Age: 42
End: 2017-07-07
Attending: NURSE PRACTITIONER
Payer: COMMERCIAL

## 2017-07-07 VITALS — DIASTOLIC BLOOD PRESSURE: 64 MMHG | WEIGHT: 168 LBS | BODY MASS INDEX: 31.23 KG/M2 | SYSTOLIC BLOOD PRESSURE: 120 MMHG

## 2017-07-07 DIAGNOSIS — O09.93 ENCOUNTER FOR SUPERVISION OF HIGH RISK PREGNANCY IN THIRD TRIMESTER, ANTEPARTUM: Primary | ICD-10-CM

## 2017-07-07 DIAGNOSIS — Z34.80 SUPERVISION OF OTHER NORMAL PREGNANCY, ANTEPARTUM: ICD-10-CM

## 2017-07-07 LAB
GLUCOSE 1H P 50 G GLC PO SERPL-MCNC: 136 MG/DL (ref 70–139)
HCT VFR BLD AUTO: 34 % (ref 37–47)
HGB BLD-MCNC: 11.4 G/DL (ref 12–16)
TREPONEMA PALLIDUM IGG+IGM AB [PRESENCE] IN SERUM OR PLASMA BY IMMUNOASSAY: NON REACTIVE

## 2017-07-07 PROCEDURE — 90040 PR PRENATAL FOLLOW UP: CPT | Performed by: NURSE PRACTITIONER

## 2017-07-07 NOTE — PROGRESS NOTES
Pt here today for OB follow up  Reports +FM  Pt has no complaints  Pt was not aware she needed to do labs  Labs reprinted and informed pt that she needs to do them  Good # [phone]

## 2017-07-07 NOTE — PROGRESS NOTES
S:  Pt is  at 33w6d for routine OB follow up.  Pt had not gotten 1 hr GTT drawn. Has given several reasons why she has not, including she was never told she had to have them drawn and that she thought we would be drawing them in the clinic. Discussed importance of having labs drawn. Pt told that if she is not going to have her labs done she would need to sign an AMA paper. Patient upset, but says she will have her labs drawn today after this appt.  Reports good FM.  Denies VB, LOF, RUCs or vaginal DC.    O:  Please see above vitals.        FHTs: 140        Fundal ht: 33 cm.        S=D    A:  IUP at 33w6d  Patient Active Problem List    Diagnosis Date Noted   • Elderly multigravida in second trimester 03/15/2017   • History of macrosomia in infant in prior pregnancy, currently pregnant in second trimester 03/15/2017   • Incontinence 03/15/2017   • Migraine without aura 03/15/2017        P:  1.  GBS @ 35 wks.          2.  Continue FKCs.          3.  Questions answered.          4.  Encouraged pt to tour L&D.          5.  Encourage adequate water intake.        6.  F/u 2 wks.        7.  Pt will have labs drawn today, understands that she will have to sign AMA paperwork if not done by next visit.

## 2017-07-07 NOTE — PATIENT INSTRUCTIONS
1.  GBS @ 35 wks.          2.  Continue FKCs.          3.  Questions answered.          4.  Encouraged pt to tour L&D.          5.  Encourage adequate water intake.        6.  F/u 2 wks.        7.  Pt will have labs drawn today, understands that she will have to sign AMA paperwork if not done by next visit.

## 2017-07-21 ENCOUNTER — ROUTINE PRENATAL (OUTPATIENT)
Dept: OBGYN | Facility: CLINIC | Age: 42
End: 2017-07-21

## 2017-07-21 ENCOUNTER — HOSPITAL ENCOUNTER (OUTPATIENT)
Facility: MEDICAL CENTER | Age: 42
End: 2017-07-21
Attending: NURSE PRACTITIONER

## 2017-07-21 VITALS — SYSTOLIC BLOOD PRESSURE: 116 MMHG | DIASTOLIC BLOOD PRESSURE: 70 MMHG | BODY MASS INDEX: 30.86 KG/M2 | WEIGHT: 166 LBS

## 2017-07-21 DIAGNOSIS — O09.523 ELDERLY MULTIGRAVIDA IN THIRD TRIMESTER: ICD-10-CM

## 2017-07-21 PROCEDURE — 87653 STREP B DNA AMP PROBE: CPT

## 2017-07-21 PROCEDURE — 90040 PR PRENATAL FOLLOW UP: CPT | Performed by: NURSE PRACTITIONER

## 2017-07-21 NOTE — MR AVS SNAPSHOT
Amada Meneses   2017 11:30 AM   Routine Prenatal   MRN: [id number]    [facility]   Dept Phone:  [phone]    Description:  Female : 1975   Provider:  GILBERTO Johnson           Allergies as of 2017     Not on File      You were diagnosed with     Elderly multigravida in third trimester   [[id number]]         Vital Signs     Blood Pressure Weight Last Menstrual Period Smoking Status          116/70 mmHg 75.297 kg (166 lb) 2016 (Exact Date) Never Smoker         Basic Information     Date Of Birth Sex Race Ethnicity Preferred Language    1975 Female Unable to Obtain Unknown Mauritanian      Your appointments     2017  9:15 AM   OB Follow Up with GILBERTO Johnson   [facility] [address] [address] [phone]              Problem List              ICD-10-CM Priority Class Noted - Resolved    Elderly multigravida in third trimester O09.523   3/15/2017 - Present    History of macrosomia in infant in prior pregnancy, currently pregnant in second trimester O09.292   3/15/2017 - Present    Incontinence R32   3/15/2017 - Present    Migraine without aura G43.009   3/15/2017 - Present      Health Maintenance        Date Due Completion Dates    MAMMOGRAM 2017 ---    IMM INFLUENZA (1) 2017 ---    PAP SMEAR 3/15/2020 3/15/2017    IMM DTaP/Tdap/Td Vaccine (2 - Td) 2027            Current Immunizations     Tdap Vaccine 2017      Below and/or attached are the medications your provider expects you to take. Review all of your home medications and newly ordered medications with your provider and/or pharmacist. Follow medication instructions as directed by your provider and/or pharmacist. Please keep your medication list with you and share with your provider. Update the information when medications are discontinued, doses are changed, or new medications (including over-the-counter  products) are added; and carry medication information at all times in the event of emergency situations     Allergies:  No Known Allergies          Medications  Valid as of: July 21, 2017 - 12:09 PM    Generic Name Brand Name Tablet Size Instructions for use    albuterol (PROVENTIL) 2.5 mg/0.5 mL Nebu Soln (Nebu Soln) PROVENTIL 2.5 mg/0.5 mL by Nebulization route ONCE (RT).        Butalbital-APAP-Caffeine (Tab) FIORICET -40 MG Take 1 Tab by mouth every four hours as needed for Headache.        Prenatal Multivit-Min-Fe-FA   Take  by mouth.        .                 Medicines prescribed today were sent to:     [id number] - [place], [place] - [address]    [address] [place] [address]    Phone: [phone] Fax: [phone]    Open 24 Hours?: No      Medication refill instructions:       If your prescription bottle indicates you have medication refills left, it is not necessary to call your provider’s office. Please contact your pharmacy and they will refill your medication.    If your prescription bottle indicates you do not have any refills left, you may request refills at any time through one of the following ways: The online [employer] system (except Urgent Care), by calling your provider’s office, or by asking your pharmacy to contact your provider’s office with a refill request. Medication refills are processed only during regular business hours and may not be available until the next business day. Your provider may request additional information or to have a follow-up visit with you prior to refilling your medication.   *Please Note: Medication refills are assigned a new Rx number when refilled electronically. Your pharmacy may indicate that no refills were authorized even though a new prescription for the same medication is available at the pharmacy. Please request the medicine by name with the pharmacy before contacting your provider for a refill.        Your To Do List     Future Labs/Procedures  Complete By Expires    GRP B STREP, BY PCR ([facility])  As directed 7/21/2018    Comments:    Source Vaginal and rectum      Other Notes About Your Plan     GIRL - Bhakti           MyChart Status: Patient Declined

## 2017-07-21 NOTE — PROGRESS NOTES
Ob f/u. + fetal movement   No VB, LOF or contractions   C/O some contractions,  Abdominal hardness  Phone number [phone]  Pharmacy verified with patient  WT=  166lbs           BP=  116/70  Need strep today

## 2017-07-21 NOTE — PROGRESS NOTES
S: Amada Meneses at [id number] for OB  follow up visit.  Patient hascomplaints of cramps and tight tummy fatigue   Fetal movement is +  Denies any loss of fluid, vaginal bleeding .    O: VSS  Urine dip NE  See above values  Cervical exam 1/50/-1 post     A: multip elderly 35w6d      P:   3rd Trimester Guidance and comfort measures, diet and exercise review.  Labs: GBS  Fetal Kick Count continue   RTC in 1 week

## 2017-07-22 DIAGNOSIS — O09.523 ELDERLY MULTIGRAVIDA IN THIRD TRIMESTER: ICD-10-CM

## 2017-07-23 LAB — GP B STREP DNA SPEC QL NAA+PROBE: POSITIVE

## 2017-07-28 ENCOUNTER — ROUTINE PRENATAL (OUTPATIENT)
Dept: OBGYN | Facility: CLINIC | Age: 42
End: 2017-07-28

## 2017-07-28 VITALS — BODY MASS INDEX: 31.05 KG/M2 | SYSTOLIC BLOOD PRESSURE: 120 MMHG | DIASTOLIC BLOOD PRESSURE: 70 MMHG | WEIGHT: 167 LBS

## 2017-07-28 DIAGNOSIS — O99.820 GBS (GROUP B STREPTOCOCCUS CARRIER), +RV CULTURE, CURRENTLY PREGNANT: ICD-10-CM

## 2017-07-28 DIAGNOSIS — O09.523 ELDERLY MULTIGRAVIDA IN THIRD TRIMESTER: ICD-10-CM

## 2017-07-28 PROCEDURE — 90040 PR PRENATAL FOLLOW UP: CPT | Performed by: NURSE PRACTITIONER

## 2017-07-28 NOTE — MR AVS SNAPSHOT
Amada Meneses   2017 9:15 AM   Routine Prenatal   MRN: [id number]    [facility]   Dept Phone:  [phone]    Description:  Female : 1975   Provider:  GILBRETO Johnson           Allergies as of 2017     Not on File      You were diagnosed with     GBS (group B Streptococcus carrier), +RV culture, currently pregnant   [[id number]]       Elderly multigravida in third trimester   [1436617]         Vital Signs     Blood Pressure Weight Last Menstrual Period Smoking Status          120/70 mmHg 75.751 kg (167 lb) 2016 (Exact Date) Never Smoker         Basic Information     Date Of Birth Sex Race Ethnicity Preferred Language    1975 Female Unable to Obtain Unknown Azeri      Your appointments     Aug 04, 2017 11:15 AM   OB Follow Up with GILBERTO Johnson   [facility] [address] [address] [phone]              Problem List              ICD-10-CM Priority Class Noted - Resolved    Elderly multigravida in third trimester O09.523 High  3/15/2017 - Present    History of macrosomia in infant in prior pregnancy, currently pregnant in second trimester O09.292   3/15/2017 - Present    Incontinence R32   3/15/2017 - Present    Migraine without aura G43.009   3/15/2017 - Present    GBS (group B Streptococcus carrier), +RV culture, currently pregnant O99.820 High  2017 - Present      Health Maintenance        Date Due Completion Dates    MAMMOGRAM 2017 ---    IMM INFLUENZA (1) 2017 ---    PAP SMEAR 3/15/2020 3/15/2017    IMM DTaP/Tdap/Td Vaccine (2 - Td) 2027            Current Immunizations     Tdap Vaccine 2017      Below and/or attached are the medications your provider expects you to take. Review all of your home medications and newly ordered medications with your provider and/or pharmacist. Follow medication instructions as directed by your provider and/or pharmacist.  Please keep your medication list with you and share with your provider. Update the information when medications are discontinued, doses are changed, or new medications (including over-the-counter products) are added; and carry medication information at all times in the event of emergency situations     Allergies:  No Known Allergies          Medications  Valid as of: July 28, 2017 -  9:57 AM    Generic Name Brand Name Tablet Size Instructions for use    albuterol (PROVENTIL) 2.5 mg/0.5 mL Nebu Soln (Nebu Soln) PROVENTIL 2.5 mg/0.5 mL by Nebulization route ONCE (RT).        Butalbital-APAP-Caffeine (Tab) FIORICET -40 MG Take 1 Tab by mouth every four hours as needed for Headache.        Prenatal Multivit-Min-Fe-FA   Take  by mouth.        .                 Medicines prescribed today were sent to:     [id number] - [place], [place] - [address]    [address] [place] [address]    Phone: [phone] Fax: [phone]    Open 24 Hours?: No      Medication refill instructions:       If your prescription bottle indicates you have medication refills left, it is not necessary to call your provider’s office. Please contact your pharmacy and they will refill your medication.    If your prescription bottle indicates you do not have any refills left, you may request refills at any time through one of the following ways: The online [employer] system (except Urgent Care), by calling your provider’s office, or by asking your pharmacy to contact your provider’s office with a refill request. Medication refills are processed only during regular business hours and may not be available until the next business day. Your provider may request additional information or to have a follow-up visit with you prior to refilling your medication.   *Please Note: Medication refills are assigned a new Rx number when refilled electronically. Your pharmacy may indicate that no refills were authorized even though a new prescription for the same  medication is available at the pharmacy. Please request the medicine by name with the pharmacy before contacting your provider for a refill.        Other Notes About Your Plan     GIRL - Bhakti           MyChart Status: Patient Declined

## 2017-07-28 NOTE — PROGRESS NOTES
Ob f/u. + fetal movement   No VB, LOF or contractions   C/O lower back pain, stomach hardening, loss sleep x 2 day   Phone number [phone]  Pharmacy verified with patient  [id number]             HA=559/70

## 2017-07-28 NOTE — PROGRESS NOTES
S: Amada Meneses at [id number] for OB  follow up visit.  Patient has complaints of low back pain and irregular contractions x 2 days not sleeping well   Fetal movement is +   Denies any loss of fluid, vaginal bleeding.    O: VSS  Urine dip NE   See above values  Cervical exam 1/50/-1 ML soft    A: mulitp AMA 36w6d      P:    3rd Trimester Guidance and comfort measures, diet and exercise review.  Labs: + GBS  Explained   Fetal Kick Count continue   RTC in 1 week    Tylenol may be taken if needed  and comfort measures given for back pain   Labor s/s reviewed with patient

## 2017-08-01 ENCOUNTER — HOSPITAL ENCOUNTER (OUTPATIENT)
Facility: MEDICAL CENTER | Age: 42
End: 2017-08-01
Attending: OBSTETRICS & GYNECOLOGY | Admitting: OBSTETRICS & GYNECOLOGY
Payer: MEDICAID

## 2017-08-01 VITALS
DIASTOLIC BLOOD PRESSURE: 67 MMHG | RESPIRATION RATE: 20 BRPM | BODY MASS INDEX: 27.18 KG/M2 | SYSTOLIC BLOOD PRESSURE: 119 MMHG | WEIGHT: 147.71 LBS | TEMPERATURE: 98.3 F | HEIGHT: 62 IN | HEART RATE: 87 BPM

## 2017-08-01 PROCEDURE — 59025 FETAL NON-STRESS TEST: CPT | Performed by: OBSTETRICS & GYNECOLOGY

## 2017-08-01 ASSESSMENT — PAIN SCALES - GENERAL: PAINLEVEL_OUTOF10: 4

## 2017-08-01 NOTE — PROGRESS NOTES
41yo  , 37.4 presents after falling in the shower this morning around 11am. Pt states that she fell on her left side, hitting the back of her arm, breast and side. Pt has a large bruise on her arm and breast. No bruising noted on the side but pt is tender to the touch. Pt states that she only hit a little of her belly in the fall. Pos fm. Denies LOF or vag bleeding. Language line # [id number] (Glynn) used to obtain and confirm information.   1345 Dr Harp at bedside. LL# [id number] (Vijay) used to answer questions.   1425 LL# [id number] (Jon) used to tell pt that we will be monitoring baby and UCs for another hour or so and, if all is well, she will be discharged to home. Pt states understanding. No questions at this time. Water given.   1630 Dr. Mccurdy updated. Okay to discharge to home.   1635  Discharge instructions given, pt states understanding. Reactive strip obtained. Pt discharged to home  in stable condition, ambulatory, with son.

## 2017-08-02 NOTE — PROGRESS NOTES
"[facility] LABOR AND DELIVERY TRIAGE PROGRESS NOTE    PATIENT ID:  NAME:  Amada Meneses  MRN:               [id number]  YOB: 1975     42 y.o. female  at 37w3d. , came in after an hour accidental fall she had during the shower, denies direct fall on her belly , head injury and loss of consciousness. History collected via phone translation    Subjective:   Pregnancy complicated by none    negative  For CTXS.   positive Feels pain only over left underarm and ipsilater breast  (the area she fell on)   negative for LOF  negative for vaginal bleeding.   positive -  for fetal movement    ROS: Patient denies any fever chills, nausea, vomiting, headache, chest pain, shortness of breath, or dysuria or unusual swelling of hands or feet.     Objective:    Filed Vitals:    17 1300 17 1352 17 1500 17 1552   BP:  137/88  119/67   Pulse:  99  87   Temp: 37.1 °C (98.7 °F)  36.8 °C (98.3 °F)    TempSrc: Temporal      Resp:   20    Height: 1.575 m (5' 2\")      Weight: 67 kg (147 lb 11.3 oz)        Temp (24hrs), Av.9 °C (98.5 °F), Min:36.8 °C (98.3 °F), Max:37.1 °C (98.7 °F)    General: No acute distress, resting comfortably in bed.  HEENT: normocephalic, nontraumatic, PERRLA, EOMI  Cardiovascular: Heart RRR with no murmurs, rubs or gallops. Distal Pulses 2+  Respiratory: symmetric chest expansion, lungs CTAB, with no wheezes, rales, rhonci, bruises noted over the  lateral side of chest and breast on the left, and ipsilateral  under arm      Abdomen: gravid, nontender  Musculoskeletal: strength 5/5 in four extremities  Neuro: non focal with no numbness, tingling or changes in sensation    Cervix:  not checked   McCormick: Uterine Contractions 3-5 / hour   FHRM: Baseline 140,  Positive Accels , occasional decels, rare variability    Assessment: 42 y.o. female  at 37w3d.    Plan:   4 hours of observation and monitoring    Patient is cleared to return home with family if observation " was unremarkable, Encouraged to see MD for increased painful uterine contractions @ 3-5, vaginal bleeding, loss of fluid, or other serious symptoms.      Discussed case with Dr. Carpio, [facility] Attending. Case was discussed and attending agreed with plan prior to discharge of patient.    Eugenia Ryan M.D.

## 2017-08-04 ENCOUNTER — ROUTINE PRENATAL (OUTPATIENT)
Dept: OBGYN | Facility: CLINIC | Age: 42
End: 2017-08-04

## 2017-08-04 VITALS — DIASTOLIC BLOOD PRESSURE: 68 MMHG | WEIGHT: 169 LBS | BODY MASS INDEX: 30.9 KG/M2 | SYSTOLIC BLOOD PRESSURE: 110 MMHG

## 2017-08-04 DIAGNOSIS — O99.820 GBS (GROUP B STREPTOCOCCUS CARRIER), +RV CULTURE, CURRENTLY PREGNANT: ICD-10-CM

## 2017-08-04 DIAGNOSIS — O09.523 ELDERLY MULTIGRAVIDA IN THIRD TRIMESTER: ICD-10-CM

## 2017-08-04 PROCEDURE — 90040 PR PRENATAL FOLLOW UP: CPT | Performed by: NURSE PRACTITIONER

## 2017-08-04 NOTE — PROGRESS NOTES
S: Amada Meneses at [id number] for OB  follow up visit.  Patient  Has no complaints today  Fetal movement is +  Denies any loss of fluid, vaginal bleeding or contractions.  Pt had a fall in the tub and hit the side of the ribs and abdomen, she went to L&D and had over 4 hours of monitoring, sent home, pt does have large bruise left upper arm and states it hurts a lot    O: VSS  Urine dip NE  See above values  Cervical exam NE    A: [id number]  AMA    P:     Trimester Guidance and comfort measures, diet and exercise review.  Labs: none due + GBS Urine  Fetal Kick Count continue   RTC in 1 week  Labor s/s reviewed   Tylenol and ice packs for pain

## 2017-08-04 NOTE — MR AVS SNAPSHOT
Amada Meneses   2017 11:15 AM   Routine Prenatal   MRN: [id number]    [facility]   Dept Phone:  [phone]    Description:  Female : 1975   Provider:  GILBERTO Johnson           Allergies as of 2017     Not on File      You were diagnosed with     GBS (group B Streptococcus carrier), +RV culture, currently pregnant   [[id number]]       Elderly multigravida in third trimester   [7888161]         Vital Signs     Blood Pressure Weight Last Menstrual Period Smoking Status          110/68 mmHg 76.658 kg (169 lb) 2016 (Exact Date) Never Smoker         Basic Information     Date Of Birth Sex Race Ethnicity Preferred Language    1975 Female White  Origin ([place]ovenian,[place] citizen,[place],Jt, etc) Slovenian      Problem List              ICD-10-CM Priority Class Noted - Resolved    Elderly multigravida in third trimester O09.523 High  3/15/2017 - Present    History of macrosomia in infant in prior pregnancy, currently pregnant in second trimester O09.292   3/15/2017 - Present    Incontinence R32   3/15/2017 - Present    Migraine without aura G43.009   3/15/2017 - Present    GBS (group B Streptococcus carrier), +RV culture, currently pregnant O99.820 High  2017 - Present      Health Maintenance        Date Due Completion Dates    MAMMOGRAM 2017 ---    IMM INFLUENZA (1) 2017 ---    PAP SMEAR 3/15/2020 3/15/2017    IMM DTaP/Tdap/Td Vaccine (2 - Td) 2027            Current Immunizations     Tdap Vaccine 2017      Below and/or attached are the medications your provider expects you to take. Review all of your home medications and newly ordered medications with your provider and/or pharmacist. Follow medication instructions as directed by your provider and/or pharmacist. Please keep your medication list with you and share with your provider. Update the information when medications are discontinued, doses are changed, or new  medications (including over-the-counter products) are added; and carry medication information at all times in the event of emergency situations     Allergies:  No Known Allergies          Medications  Valid as of: August 04, 2017 - 11:49 AM    Generic Name Brand Name Tablet Size Instructions for use    albuterol (PROVENTIL) 2.5 mg/0.5 mL Nebu Soln (Nebu Soln) PROVENTIL 2.5 mg/0.5 mL by Nebulization route ONCE (RT).        Butalbital-APAP-Caffeine (Tab) FIORICET -40 MG Take 1 Tab by mouth every four hours as needed for Headache.        Prenatal Multivit-Min-Fe-FA   Take  by mouth.        .                 Medicines prescribed today were sent to:     [id number] - [place], [place] - [address]    [address] [address]    Phone: [phone] Fax: [phone]    Open 24 Hours?: No      Medication refill instructions:       If your prescription bottle indicates you have medication refills left, it is not necessary to call your provider’s office. Please contact your pharmacy and they will refill your medication.    If your prescription bottle indicates you do not have any refills left, you may request refills at any time through one of the following ways: The online [employer] system (except Urgent Care), by calling your provider’s office, or by asking your pharmacy to contact your provider’s office with a refill request. Medication refills are processed only during regular business hours and may not be available until the next business day. Your provider may request additional information or to have a follow-up visit with you prior to refilling your medication.   *Please Note: Medication refills are assigned a new Rx number when refilled electronically. Your pharmacy may indicate that no refills were authorized even though a new prescription for the same medication is available at the pharmacy. Please request the medicine by name with the pharmacy before contacting your provider for a refill.        Other  Notes About Your Plan     GIRL - Bhakti           MyChart Status: Patient Declined

## 2017-08-04 NOTE — PROGRESS NOTES
Ob f/u. + fetal movement baby is moving slow not like usually   No VB, LOF or contractions   C/O ER 08/02/2017 pt states that fell in the tub, pt denies direct fall on her belly , head injury and loss of consciousness, at the ER they monitor the baby`s heart. EDDIE Ryan  Phone number  [phone]  Pharmacy verified with patient  WT=     169 lbs        BP= 110/68  IOL needed?

## 2017-08-11 ENCOUNTER — ROUTINE PRENATAL (OUTPATIENT)
Dept: OBGYN | Facility: CLINIC | Age: 42
End: 2017-08-11

## 2017-08-11 VITALS — SYSTOLIC BLOOD PRESSURE: 114 MMHG | DIASTOLIC BLOOD PRESSURE: 66 MMHG | BODY MASS INDEX: 31.09 KG/M2 | WEIGHT: 170 LBS

## 2017-08-11 DIAGNOSIS — O99.820 GBS (GROUP B STREPTOCOCCUS CARRIER), +RV CULTURE, CURRENTLY PREGNANT: ICD-10-CM

## 2017-08-11 DIAGNOSIS — O09.523 ELDERLY MULTIGRAVIDA IN THIRD TRIMESTER: ICD-10-CM

## 2017-08-11 PROCEDURE — 90040 PR PRENATAL FOLLOW UP: CPT | Performed by: NURSE PRACTITIONER

## 2017-08-11 NOTE — MR AVS SNAPSHOT
Amada Meneses   2017 4:00 PM   Routine Prenatal   MRN: [id number]    [facility]   Dept Phone:  [phone]    Description:  Female : 1975   Provider:  GILBERTO Johnson           Allergies as of 2017     Not on File      You were diagnosed with     GBS (group B Streptococcus carrier), +RV culture, currently pregnant   [[id number]]       Elderly multigravida in third trimester   [[id number]]         Vital Signs     Blood Pressure Weight Last Menstrual Period Smoking Status          114/66 mmHg 77.111 kg (170 lb) 2016 (Exact Date) Never Smoker         Basic Information     Date Of Birth Sex Race Ethnicity Preferred Language    1975 Female White  Origin (Tamazight,South African,Iraqi,Jt, etc) Tamazight      Your appointments     Aug 16, 2017  9:00 AM   OB Follow Up with Eveline Barrett D.N.P.   [facility] [address] [id number] [phone]              Problem List              ICD-10-CM Priority Class Noted - Resolved    Elderly multigravida in third trimester O09.523 High  3/15/2017 - Present    History of macrosomia in infant in prior pregnancy, currently pregnant in second trimester O09.292   3/15/2017 - Present    Incontinence R32   3/15/2017 - Present    Migraine without aura G43.009   3/15/2017 - Present    GBS (group B Streptococcus carrier), +RV culture, currently pregnant O99.820 High  2017 - Present      Health Maintenance        Date Due Completion Dates    MAMMOGRAM 2017 ---    IMM INFLUENZA (1) 2017 ---    PAP SMEAR 3/15/2020 3/15/2017    IMM DTaP/Tdap/Td Vaccine (2 - Td) 2027            Current Immunizations     Tdap Vaccine 2017      Below and/or attached are the medications your provider expects you to take. Review all of your home medications and newly ordered medications with your provider and/or pharmacist. Follow medication instructions as directed  by your provider and/or pharmacist. Please keep your medication list with you and share with your provider. Update the information when medications are discontinued, doses are changed, or new medications (including over-the-counter products) are added; and carry medication information at all times in the event of emergency situations     Allergies:  No Known Allergies          Medications  Valid as of: August 11, 2017 -  4:17 PM    Generic Name Brand Name Tablet Size Instructions for use    albuterol (PROVENTIL) 2.5 mg/0.5 mL Nebu Soln (Nebu Soln) PROVENTIL 2.5 mg/0.5 mL by Nebulization route ONCE (RT).        Butalbital-APAP-Caffeine (Tab) FIORICET -40 MG Take 1 Tab by mouth every four hours as needed for Headache.        Prenatal Multivit-Min-Fe-FA   Take  by mouth.        .                 Medicines prescribed today were sent to:     [id number] - [place], [place] - [address]    [address] [place] [address]    Phone: [phone] Fax: [phone]    Open 24 Hours?: No      Medication refill instructions:       If your prescription bottle indicates you have medication refills left, it is not necessary to call your provider’s office. Please contact your pharmacy and they will refill your medication.    If your prescription bottle indicates you do not have any refills left, you may request refills at any time through one of the following ways: The online [employer] system (except Urgent Care), by calling your provider’s office, or by asking your pharmacy to contact your provider’s office with a refill request. Medication refills are processed only during regular business hours and may not be available until the next business day. Your provider may request additional information or to have a follow-up visit with you prior to refilling your medication.   *Please Note: Medication refills are assigned a new Rx number when refilled electronically. Your pharmacy may indicate that no refills were authorized even  though a new prescription for the same medication is available at the pharmacy. Please request the medicine by name with the pharmacy before contacting your provider for a refill.        Your To Do List     Future Labs/Procedures Complete By Expires    INDUCTION OF LABOR  As directed 8/11/2018      Other Notes About Your Plan     GIRL - Bhakti Jenkins Status: Patient Declined

## 2017-08-11 NOTE — PROGRESS NOTES
S: Amada Meneses at [id number] for OB  follow up visit.  Patient has complaints of contractions irregular but strong  Fetal movement is +  Denies any loss of fluid, vaginal bleeding.    O: VSS  Urine dip NE  See above values  Cervical exam 2/50/-1 posterior   A:  multip 38w6d      P:   3rd  Trimester Guidance and comfort measures, diet and exercise review.  Labs: none due  Fetal Kick Count continue  RTC in 1 week  Labor s/s reviewed with client   IOL order placed

## 2017-08-16 ENCOUNTER — ROUTINE PRENATAL (OUTPATIENT)
Dept: OBGYN | Facility: CLINIC | Age: 42
End: 2017-08-16

## 2017-08-16 VITALS — DIASTOLIC BLOOD PRESSURE: 70 MMHG | WEIGHT: 169 LBS | SYSTOLIC BLOOD PRESSURE: 118 MMHG | BODY MASS INDEX: 30.9 KG/M2

## 2017-08-16 DIAGNOSIS — O99.820 GBS (GROUP B STREPTOCOCCUS CARRIER), +RV CULTURE, CURRENTLY PREGNANT: ICD-10-CM

## 2017-08-16 DIAGNOSIS — O09.523 ELDERLY MULTIGRAVIDA IN THIRD TRIMESTER: ICD-10-CM

## 2017-08-16 PROCEDURE — 90040 PR PRENATAL FOLLOW UP: CPT | Performed by: NURSE PRACTITIONER

## 2017-08-16 NOTE — PROGRESS NOTES
SUBJECTIVE:  Pt is a 42 y.o.   at 39w4d  gestation. Presents today for follow-up prenatal care. Reports no issues at this time.  Reports good  fetal movement. Denies cramping/contractions, bleeding or leaking of fluid. Denies dysuria, headaches, N/V, or other issues at this time. Generally feels well today.     OBJECTIVE:  - See prenatal vitals flow  -   Filed Vitals:    17 0835   BP: 118/70   Weight: 76.658 kg (169 lb)      - Pertinent Labs: normal prenatal panel, normal glucose. Positive GBS. Neg AFP  - Pertinent ultrasound: Normal fetal survey            ASSESSMENT:   - IUP at 39w4d    - S=D   -   Patient Active Problem List    Diagnosis Date Noted   • GBS (group B Streptococcus carrier), +RV culture, currently pregnant 2017     Priority: High   • Elderly multigravida in third trimester 03/15/2017     Priority: High   • History of macrosomia in infant in prior pregnancy, currently pregnant in second trimester 03/15/2017   • Incontinence 03/15/2017   • Migraine without aura 03/15/2017         PLAN:  - S/sx pregnancy and labor warning signs vs general discomforts discussed  - Fetal movements and kick counts reviewed   - Adequate hydration reinforced  - Nutrition/exercise/vitamin education: continued PNV  Previous provider placed induction of labor order.

## 2017-08-16 NOTE — PROGRESS NOTES
Ob f/u. + fetal movement   No VB, LOF or contractions   C/O contractions, lower back pain, vaginal pain and pressure.  Phone number # [phone]  Pharmacy verified with patient  WT= 169 lbs            VK=414/70

## 2017-08-16 NOTE — MR AVS SNAPSHOT
Amada Meneses   2017 9:00 AM   Routine Prenatal   MRN: [id number]    [facility]   Dept Phone:  [phone]    Description:  Female : 1975   Provider:  Eveline Barrett D.N.P.           Allergies as of 2017     Not on File      You were diagnosed with     GBS (group B Streptococcus carrier), +RV culture, currently pregnant   [[id number]]       Elderly multigravida in third trimester   [[id number]]         Vital Signs     Blood Pressure Weight Last Menstrual Period Smoking Status          118/70 mmHg 76.658 kg (169 lb) 2016 (Exact Date) Never Smoker         Basic Information     Date Of Birth Sex Race Ethnicity Preferred Language    1975 Female White  Origin (Turkmen,Citizen of the [place]oc[place],Jt, etc) Turkmen      Your appointments     Aug 27, 2017  9:00 AM   [facility] INDUCTION OF LABOR with NON-SURGICAL L&D   LABOR & DELIVERY [facility] (--)    [address] [place] [address] [phone]              Problem List              ICD-10-CM Priority Class Noted - Resolved    Elderly multigravida in third trimester O09.523 High  3/15/2017 - Present    History of macrosomia in infant in prior pregnancy, currently pregnant in second trimester O09.292   3/15/2017 - Present    Incontinence R32   3/15/2017 - Present    Migraine without aura G43.009   3/15/2017 - Present    GBS (group B Streptococcus carrier), +RV culture, currently pregnant O99.820 High  2017 - Present      Health Maintenance        Date Due Completion Dates    MAMMOGRAM 2017 ---    IMM INFLUENZA (1) 2017 ---    PAP SMEAR 3/15/2020 3/15/2017    IMM DTaP/Tdap/Td Vaccine (2 - Td) 2027            Current Immunizations     Tdap Vaccine 2017      Below and/or attached are the medications your provider expects you to take. Review all of your home medications and newly ordered medications with your provider and/or pharmacist. Follow medication instructions as directed by  your provider and/or pharmacist. Please keep your medication list with you and share with your provider. Update the information when medications are discontinued, doses are changed, or new medications (including over-the-counter products) are added; and carry medication information at all times in the event of emergency situations     Allergies:  No Known Allergies          Medications  Valid as of: August 16, 2017 -  9:06 AM    Generic Name Brand Name Tablet Size Instructions for use    albuterol (PROVENTIL) 2.5 mg/0.5 mL Nebu Soln (Nebu Soln) PROVENTIL 2.5 mg/0.5 mL by Nebulization route ONCE (RT).        Butalbital-APAP-Caffeine (Tab) FIORICET -40 MG Take 1 Tab by mouth every four hours as needed for Headache.        Prenatal Multivit-Min-Fe-FA   Take  by mouth.        .                 Medicines prescribed today were sent to:     [facility] - [place], [place] - [address]    [address] [place] [address]    Phone: [phone] Fax: [phone]    Open 24 Hours?: No      Medication refill instructions:       If your prescription bottle indicates you have medication refills left, it is not necessary to call your provider’s office. Please contact your pharmacy and they will refill your medication.    If your prescription bottle indicates you do not have any refills left, you may request refills at any time through one of the following ways: The online [employer] system (except Urgent Care), by calling your provider’s office, or by asking your pharmacy to contact your provider’s office with a refill request. Medication refills are processed only during regular business hours and may not be available until the next business day. Your provider may request additional information or to have a follow-up visit with you prior to refilling your medication.   *Please Note: Medication refills are assigned a new Rx number when refilled electronically. Your pharmacy may indicate that no refills were authorized even though  a new prescription for the same medication is available at the pharmacy. Please request the medicine by name with the pharmacy before contacting your provider for a refill.        Other Notes About Your Plan     GIRL - Bhakti           Alondrahart Status: Patient Declined

## 2017-08-21 ENCOUNTER — HOSPITAL ENCOUNTER (INPATIENT)
Facility: MEDICAL CENTER | Age: 42
LOS: 3 days | End: 2017-08-24
Attending: OBSTETRICS & GYNECOLOGY | Admitting: OBSTETRICS & GYNECOLOGY
Payer: MEDICAID

## 2017-08-21 LAB
ALBUMIN SERPL BCP-MCNC: 3.4 G/DL (ref 3.2–4.9)
ALBUMIN SERPL BCP-MCNC: 3.5 G/DL (ref 3.2–4.9)
ALBUMIN/GLOB SERPL: 1 G/DL
ALBUMIN/GLOB SERPL: 1 G/DL
ALP SERPL-CCNC: 118 U/L (ref 30–99)
ALP SERPL-CCNC: 122 U/L (ref 30–99)
ALT SERPL-CCNC: 10 U/L (ref 2–50)
ALT SERPL-CCNC: 11 U/L (ref 2–50)
ANION GAP SERPL CALC-SCNC: 10 MMOL/L (ref 0–11.9)
ANION GAP SERPL CALC-SCNC: 13 MMOL/L (ref 0–11.9)
APPEARANCE UR: ABNORMAL
AST SERPL-CCNC: 18 U/L (ref 12–45)
AST SERPL-CCNC: 21 U/L (ref 12–45)
BASOPHILS # BLD AUTO: 0.2 % (ref 0–1.8)
BASOPHILS # BLD: 0.02 K/UL (ref 0–0.12)
BILIRUB SERPL-MCNC: 0.5 MG/DL (ref 0.1–1.5)
BILIRUB SERPL-MCNC: 0.6 MG/DL (ref 0.1–1.5)
BUN SERPL-MCNC: 12 MG/DL (ref 8–22)
BUN SERPL-MCNC: 13 MG/DL (ref 8–22)
CALCIUM SERPL-MCNC: 9.2 MG/DL (ref 8.5–10.5)
CALCIUM SERPL-MCNC: 9.7 MG/DL (ref 8.5–10.5)
CHLORIDE SERPL-SCNC: 102 MMOL/L (ref 96–112)
CHLORIDE SERPL-SCNC: 107 MMOL/L (ref 96–112)
CO2 SERPL-SCNC: 19 MMOL/L (ref 20–33)
CO2 SERPL-SCNC: 19 MMOL/L (ref 20–33)
COLOR UR AUTO: YELLOW
CREAT SERPL-MCNC: 0.75 MG/DL (ref 0.5–1.4)
CREAT SERPL-MCNC: 0.79 MG/DL (ref 0.5–1.4)
CRYSTALS AMN MICRO: NORMAL
EOSINOPHIL # BLD AUTO: 0.03 K/UL (ref 0–0.51)
EOSINOPHIL NFR BLD: 0.3 % (ref 0–6.9)
ERYTHROCYTE [DISTWIDTH] IN BLOOD BY AUTOMATED COUNT: 42.5 FL (ref 35.9–50)
ERYTHROCYTE [DISTWIDTH] IN BLOOD BY AUTOMATED COUNT: 42.7 FL (ref 35.9–50)
GFR SERPL CREATININE-BSD FRML MDRD: >60 ML/MIN/1.73 M 2
GFR SERPL CREATININE-BSD FRML MDRD: >60 ML/MIN/1.73 M 2
GLOBULIN SER CALC-MCNC: 3.3 G/DL (ref 1.9–3.5)
GLOBULIN SER CALC-MCNC: 3.4 G/DL (ref 1.9–3.5)
GLUCOSE SERPL-MCNC: 72 MG/DL (ref 65–99)
GLUCOSE SERPL-MCNC: 84 MG/DL (ref 65–99)
GLUCOSE UR QL STRIP.AUTO: NEGATIVE MG/DL
HCT VFR BLD AUTO: 31.9 % (ref 37–47)
HCT VFR BLD AUTO: 33.1 % (ref 37–47)
HGB BLD-MCNC: 10.8 G/DL (ref 12–16)
HGB BLD-MCNC: 11.5 G/DL (ref 12–16)
HOLDING TUBE BB 8507: NORMAL
IMM GRANULOCYTES # BLD AUTO: 0.09 K/UL (ref 0–0.11)
IMM GRANULOCYTES NFR BLD AUTO: 0.8 % (ref 0–0.9)
KETONES UR QL STRIP.AUTO: NEGATIVE MG/DL
LEUKOCYTE ESTERASE UR QL STRIP.AUTO: NEGATIVE
LYMPHOCYTES # BLD AUTO: 1.46 K/UL (ref 1–4.8)
LYMPHOCYTES NFR BLD: 13.2 % (ref 22–41)
MAGNESIUM SERPL-MCNC: 4.7 MG/DL (ref 1.5–2.5)
MCH RBC QN AUTO: 32.4 PG (ref 27–33)
MCH RBC QN AUTO: 32.7 PG (ref 27–33)
MCHC RBC AUTO-ENTMCNC: 33.9 G/DL (ref 33.6–35)
MCHC RBC AUTO-ENTMCNC: 34.7 G/DL (ref 33.6–35)
MCV RBC AUTO: 94 FL (ref 81.4–97.8)
MCV RBC AUTO: 95.8 FL (ref 81.4–97.8)
MONOCYTES # BLD AUTO: 0.74 K/UL (ref 0–0.85)
MONOCYTES NFR BLD AUTO: 6.7 % (ref 0–13.4)
NEUTROPHILS # BLD AUTO: 8.74 K/UL (ref 2–7.15)
NEUTROPHILS NFR BLD: 78.8 % (ref 44–72)
NITRITE UR QL STRIP.AUTO: NEGATIVE
NRBC # BLD AUTO: 0 K/UL
NRBC BLD AUTO-RTO: 0 /100 WBC
PH UR STRIP.AUTO: 7 [PH]
PLATELET # BLD AUTO: 223 K/UL (ref 164–446)
PLATELET # BLD AUTO: 226 K/UL (ref 164–446)
PMV BLD AUTO: 10.2 FL (ref 9–12.9)
PMV BLD AUTO: 10.3 FL (ref 9–12.9)
POTASSIUM SERPL-SCNC: 3.9 MMOL/L (ref 3.6–5.5)
POTASSIUM SERPL-SCNC: 3.9 MMOL/L (ref 3.6–5.5)
PROT SERPL-MCNC: 6.7 G/DL (ref 6–8.2)
PROT SERPL-MCNC: 6.9 G/DL (ref 6–8.2)
PROT UR QL STRIP: NEGATIVE MG/DL
RBC # BLD AUTO: 3.33 M/UL (ref 4.2–5.4)
RBC # BLD AUTO: 3.52 M/UL (ref 4.2–5.4)
RBC UR QL AUTO: ABNORMAL
SODIUM SERPL-SCNC: 134 MMOL/L (ref 135–145)
SODIUM SERPL-SCNC: 136 MMOL/L (ref 135–145)
SP GR UR: 1.01
URATE SERPL-MCNC: 6.1 MG/DL (ref 1.9–8.2)
WBC # BLD AUTO: 11.1 K/UL (ref 4.8–10.8)
WBC # BLD AUTO: 12.8 K/UL (ref 4.8–10.8)

## 2017-08-21 PROCEDURE — 85025 COMPLETE CBC W/AUTO DIFF WBC: CPT

## 2017-08-21 PROCEDURE — 83735 ASSAY OF MAGNESIUM: CPT

## 2017-08-21 PROCEDURE — 700111 HCHG RX REV CODE 636 W/ 250 OVERRIDE (IP): Performed by: OBSTETRICS & GYNECOLOGY

## 2017-08-21 PROCEDURE — 89060 EXAM SYNOVIAL FLUID CRYSTALS: CPT

## 2017-08-21 PROCEDURE — 700101 HCHG RX REV CODE 250: Performed by: OBSTETRICS & GYNECOLOGY

## 2017-08-21 PROCEDURE — 770002 HCHG ROOM/CARE - OB PRIVATE (112)

## 2017-08-21 PROCEDURE — 700111 HCHG RX REV CODE 636 W/ 250 OVERRIDE (IP)

## 2017-08-21 PROCEDURE — 36415 COLL VENOUS BLD VENIPUNCTURE: CPT

## 2017-08-21 PROCEDURE — 81002 URINALYSIS NONAUTO W/O SCOPE: CPT

## 2017-08-21 PROCEDURE — 80053 COMPREHEN METABOLIC PANEL: CPT | Mod: 91

## 2017-08-21 PROCEDURE — 84550 ASSAY OF BLOOD/URIC ACID: CPT

## 2017-08-21 PROCEDURE — 700105 HCHG RX REV CODE 258: Performed by: OBSTETRICS & GYNECOLOGY

## 2017-08-21 PROCEDURE — 700105 HCHG RX REV CODE 258: Performed by: NURSE PRACTITIONER

## 2017-08-21 PROCEDURE — 4A1HXCZ MONITORING OF PRODUCTS OF CONCEPTION, CARDIAC RATE, EXTERNAL APPROACH: ICD-10-PCS | Performed by: OBSTETRICS & GYNECOLOGY

## 2017-08-21 PROCEDURE — 85027 COMPLETE CBC AUTOMATED: CPT

## 2017-08-21 RX ORDER — METHYLERGONOVINE MALEATE 0.2 MG/ML
0.2 INJECTION INTRAVENOUS
Status: DISCONTINUED | OUTPATIENT
Start: 2017-08-21 | End: 2017-08-22 | Stop reason: HOSPADM

## 2017-08-21 RX ORDER — SODIUM CHLORIDE, SODIUM LACTATE, POTASSIUM CHLORIDE, CALCIUM CHLORIDE 600; 310; 30; 20 MG/100ML; MG/100ML; MG/100ML; MG/100ML
INJECTION, SOLUTION INTRAVENOUS CONTINUOUS
Status: DISPENSED | OUTPATIENT
Start: 2017-08-21 | End: 2017-08-21

## 2017-08-21 RX ORDER — ONDANSETRON 4 MG/1
4 TABLET, ORALLY DISINTEGRATING ORAL EVERY 6 HOURS PRN
Status: DISCONTINUED | OUTPATIENT
Start: 2017-08-21 | End: 2017-08-24 | Stop reason: HOSPADM

## 2017-08-21 RX ORDER — MAGNESIUM SULFATE HEPTAHYDRATE 40 MG/ML
2 INJECTION, SOLUTION INTRAVENOUS CONTINUOUS
Status: DISCONTINUED | OUTPATIENT
Start: 2017-08-21 | End: 2017-08-22

## 2017-08-21 RX ORDER — LABETALOL HYDROCHLORIDE 5 MG/ML
20 INJECTION, SOLUTION INTRAVENOUS ONCE
Status: COMPLETED | OUTPATIENT
Start: 2017-08-21 | End: 2017-08-21

## 2017-08-21 RX ORDER — ALUMINA, MAGNESIA, AND SIMETHICONE 2400; 2400; 240 MG/30ML; MG/30ML; MG/30ML
30 SUSPENSION ORAL EVERY 6 HOURS PRN
Status: DISCONTINUED | OUTPATIENT
Start: 2017-08-21 | End: 2017-08-22 | Stop reason: HOSPADM

## 2017-08-21 RX ORDER — MISOPROSTOL 200 UG/1
800 TABLET ORAL
Status: COMPLETED | OUTPATIENT
Start: 2017-08-21 | End: 2017-08-22

## 2017-08-21 RX ORDER — MAGNESIUM SULFATE HEPTAHYDRATE 40 MG/ML
INJECTION, SOLUTION INTRAVENOUS
Status: COMPLETED
Start: 2017-08-21 | End: 2017-08-21

## 2017-08-21 RX ORDER — DEXTROSE, SODIUM CHLORIDE, SODIUM LACTATE, POTASSIUM CHLORIDE, AND CALCIUM CHLORIDE 5; .6; .31; .03; .02 G/100ML; G/100ML; G/100ML; G/100ML; G/100ML
INJECTION, SOLUTION INTRAVENOUS CONTINUOUS
Status: DISCONTINUED | OUTPATIENT
Start: 2017-08-21 | End: 2017-08-22

## 2017-08-21 RX ORDER — MAGNESIUM SULFATE HEPTAHYDRATE 40 MG/ML
4 INJECTION, SOLUTION INTRAVENOUS ONCE
Status: COMPLETED | OUTPATIENT
Start: 2017-08-21 | End: 2017-08-21

## 2017-08-21 RX ORDER — ONDANSETRON 2 MG/ML
4 INJECTION INTRAMUSCULAR; INTRAVENOUS EVERY 6 HOURS PRN
Status: DISCONTINUED | OUTPATIENT
Start: 2017-08-21 | End: 2017-08-24 | Stop reason: HOSPADM

## 2017-08-21 RX ORDER — PENICILLIN G POTASSIUM 5000000 [IU]/1
5 INJECTION, POWDER, FOR SOLUTION INTRAMUSCULAR; INTRAVENOUS ONCE
Status: COMPLETED | OUTPATIENT
Start: 2017-08-21 | End: 2017-08-21

## 2017-08-21 RX ADMIN — FENTANYL CITRATE 100 MCG: 50 INJECTION, SOLUTION INTRAMUSCULAR; INTRAVENOUS at 16:43

## 2017-08-21 RX ADMIN — SODIUM CHLORIDE 2.5 MILLION UNITS: 9 INJECTION, SOLUTION INTRAVENOUS at 19:33

## 2017-08-21 RX ADMIN — Medication 1 MILLI-UNITS/MIN: at 16:06

## 2017-08-21 RX ADMIN — MAGNESIUM SULFATE IN WATER 4 G: 40 INJECTION, SOLUTION INTRAVENOUS at 17:23

## 2017-08-21 RX ADMIN — MAGNESIUM SULFATE HEPTAHYDRATE 4 G: 40 INJECTION, SOLUTION INTRAVENOUS at 17:23

## 2017-08-21 RX ADMIN — SODIUM CHLORIDE 2.5 MILLION UNITS: 9 INJECTION, SOLUTION INTRAVENOUS at 23:32

## 2017-08-21 RX ADMIN — SODIUM CHLORIDE, SODIUM LACTATE, POTASSIUM CHLORIDE, CALCIUM CHLORIDE AND DEXTROSE MONOHYDRATE: 5; 600; 310; 30; 20 INJECTION, SOLUTION INTRAVENOUS at 23:32

## 2017-08-21 RX ADMIN — PENICILLIN G POTASSIUM 5 MILLION UNITS: 5000000 POWDER, FOR SOLUTION INTRAMUSCULAR; INTRAPLEURAL; INTRATHECAL; INTRAVENOUS at 15:08

## 2017-08-21 RX ADMIN — FENTANYL CITRATE 100 MCG: 50 INJECTION, SOLUTION INTRAMUSCULAR; INTRAVENOUS at 21:48

## 2017-08-21 RX ADMIN — MAGNESIUM SULFATE IN WATER 2 G/HR: 40 INJECTION, SOLUTION INTRAVENOUS at 17:42

## 2017-08-21 RX ADMIN — SODIUM CHLORIDE, POTASSIUM CHLORIDE, SODIUM LACTATE AND CALCIUM CHLORIDE: 600; 310; 30; 20 INJECTION, SOLUTION INTRAVENOUS at 14:40

## 2017-08-21 RX ADMIN — LABETALOL HYDROCHLORIDE 20 MG: 5 INJECTION INTRAVENOUS at 17:37

## 2017-08-21 ASSESSMENT — PATIENT HEALTH QUESTIONNAIRE - PHQ9
SUM OF ALL RESPONSES TO PHQ QUESTIONS 1-9: 0
1. LITTLE INTEREST OR PLEASURE IN DOING THINGS: NOT AT ALL
2. FEELING DOWN, DEPRESSED, IRRITABLE, OR HOPELESS: NOT AT ALL
SUM OF ALL RESPONSES TO PHQ9 QUESTIONS 1 AND 2: 0

## 2017-08-21 ASSESSMENT — COPD QUESTIONNAIRES
COPD SCREENING SCORE: 0
HAVE YOU SMOKED AT LEAST 100 CIGARETTES IN YOUR ENTIRE LIFE: NO/DON'T KNOW
DO YOU EVER COUGH UP ANY MUCUS OR PHLEGM?: NO/ONLY WITH OCCASIONAL COLDS OR INFECTIONS
DURING THE PAST 4 WEEKS HOW MUCH DID YOU FEEL SHORT OF BREATH: NONE/LITTLE OF THE TIME

## 2017-08-21 ASSESSMENT — LIFESTYLE VARIABLES
DO YOU DRINK ALCOHOL: NO
EVER_SMOKED: NEVER
ALCOHOL_USE: NO

## 2017-08-21 NOTE — IP AVS SNAPSHOT
Home Care Instructions                                                                                                                Amada Meneses   MRN: [id number]    Department:  POST PARTUM 31   2017           Your appointments     Sep 26, 2017  2:30 PM   Post Partum with Emerald Cho C.N.M.   [facility] ([facility])    [address] [facility] [address] [facility] [address]  [place] [address]   [phone]              Follow-up Information     1. Follow up with [facility] In 5 weeks.    Specialty:  OB/Gyn    Contact information    [address] [facility] [address]  [place] [address]  [phone]    Additional information:    From  [address], take the [facility] exit (# [address]) and head West on [address].   Just before [facility], take the slight left fork onto [address]   [facility] is located on the right hand side, just past [facility].       I assume responsibility for securing a follow-up  Screening blood test on my baby within the specified date range.    -                  Discharge Instructions       POSTPARTUM DISCHARGE INSTRUCTIONS FOR MOM    YOB: 1975   Age: 42 y.o.               Admit Date: 2017     Discharge Date: 2017  Attending Doctor:  Shilo Fabian M.D.                  Allergies:  Review of patient's allergies indicates no known allergies.    Discharged to home by car. Discharged via wheelchair, hospital escort: Yes.  Special equipment needed: Not Applicable  Belongings with: Personal  Be sure to schedule a follow-up appointment with your primary care doctor or any specialists as instructed.     Discharge Plan:   Diet Plan: Discussed  Activity Level: Discussed  Confirmed Follow up Appointment: Patient to Call and Schedule Appointment  Confirmed Symptoms Management: Discussed  Medication Reconciliation Updated: Yes  Influenza Vaccine Indication: Indicated: Not available from distributor/    REASONS TO CALL YOUR OBSTETRICIAN:  1.    "Persistent fever or shaking chills (Temperature higher than 100.4)  2.   Heavy bleeding (soaking more than 1 pad per hour); Passing clots  3.   Foul odor from vagina  4.   Mastitis (Breast infection; breast pain, chills, fever, redness)  5.   Urinary pain, burning or frequency  6.   Episiotomy infection  7.   Severe depression longer than 24 hours    HAND WASHING  · Prior to handling the baby.  · Before breastfeeding or bottle feeding baby.  · After using the bathroom or changing the baby's diaper.      VAGINAL CARE  · Nothing inside vagina for 6 weeks: no sexual intercourse, tampons or douching.  · Bleeding may continue for 2-4 weeks.  Amount may vary.    · Call your physician for heavy bleeding which means soaking more than 1 pad per hour    BIRTH CONTROL  · It is possible to become pregnant at any time after delivery and while breastfeeding.  · Plan to discuss a method of birth control with your physician at your follow up visit. visit.    DIET AND ELIMINATION  · Eating more fiber (bran cereal, fruits, and vegetables) and drinking plenty of fluids will help to avoid constipation.  · Urinary frequency after childbirth is normal.    POSTPARTUM BLUES  During the first few days after birth, you may experience a sense of the \"blues\" which may include impatience, irritability or even crying.  These feeling come and go quickly.  However, as many as 1 in 10 women experience emotional symptoms known as postpartum depression.    Postpartum depression:  May start as early as the second or third day after delivery or take several weeks or months to develop.  Symptoms of \"blues\" are present, but are more intense:  Crying spells; loss of appetite; feelings of hopelessness or loss of control; fear of touching the baby; over concern or no concern at all about the baby; little or no concern about your own appearance/caring for yourself; and/or inability to sleep or excessive sleeping.  Contact your physician if you are " "experiencing any of these symptoms.    Crisis Hotline:  · [place] Crisis Hotline:  4-270-ERTAUBZ  Or 1-502.210.6738  · [place] Crisis Hotline:  1-671.315.1040  Or 319-178-9141    PREVENTING SHAKEN BABY:  If you are angry or stressed, PUT THE BABY IN THE CRIB, step away, take some deep breaths, and wait until you are calm to care for the baby.  DO NOT SHAKE THE BABY.  You are not alone, call a supporter for help.    · Crisis Call Center 24/7 crisis line 166-351-5977 or 1-605.978.2356  · You can also text them, text \"ANSWER\" to 889068    QUIT SMOKING/TOBACCO USE:  I understand the use of any tobacco products increases my chance of suffering from future heart disease and could cause other illnesses which may shorten my life. Quitting the use of tobacco products is the single most important thing I can do to improve my health. For further information on smoking / tobacco cessation call a Toll Free Quit Line at 1-113.505.8533 (*[employer] [place]) or 1-335.482.6204 (American Lung Association) or you can access the web based program at www.lungusa.org.    · [place] Tobacco Users Help Line:  (182) 525-1032       Toll Free: 1-810.575.6226  · Quit Tobacco Program [facility] (614)788-7465    DEPRESSION / SUICIDE RISK:  As you are discharged from this [facility], it is important to learn how to keep safe from harming yourself.    Recognize the warning signs:  · Abrupt changes in personality, positive or negative- including increase in energy   · Giving away possessions  · Change in eating patterns- significant weight changes-  positive or negative  · Change in sleeping patterns- unable to sleep or sleeping all the time   · Unwillingness or inability to communicate  · Depression  · Unusual sadness, discouragement and loneliness  · Talk of wanting to die  · Neglect of personal appearance   · Rebelliousness- reckless behavior  · Withdrawal from people/activities they love  · Confusion- " inability to concentrate     If you or a loved one observes any of these behaviors or has concerns about self-harm, here's what you can do:  · Talk about it- your feelings and reasons for harming yourself  · Remove any means that you might use to hurt yourself (examples: pills, rope, extension cords, firearm)  · Get professional help from the community (Mental Health, Substance Abuse, psychological counseling)  · Do not be alone:Call your Safe Contact- someone whom you trust who will be there for you.  · Call your local CRISIS HOTLINE 077-9527 or 970-543-5011  · Call your local Children's Mobile Crisis Response Team [place] (515) 026-5689 or www.[employer]  · Call the toll free National Suicide Prevention Hotlines   · National Suicide Prevention Lifeline 822-146-FXKC (3758)  · National Hope Line Network 800-SUICIDE (790-3204)    DISCHARGE SURVEY:  Thank you for choosing [facility].  We hope we provided you with very good care.  You may be receiving a survey in the mail.  Please fill it out.  Your opinion is valuable to us.    ADDITIONAL EDUCATIONAL MATERIALS GIVEN TO PATIENT:        My signature on this form indicates that:  1.  I have reviewed and understand the above information  2.  My questions regarding this information have been answered to my satisfaction.  3.  I have formulated a plan with my discharge nurse to obtain my prescribed medication for home.         Discharge Medication Instructions:    Below are the medications your physician expects you to take upon discharge:    Review all your home medications and newly ordered medications with your doctor and/or pharmacist. Follow medication instructions as directed by your doctor and/or pharmacist.    Please keep your medication list with you and share with your physician.               Medication List      START taking these medications        Instructions    Morning Afternoon Evening Bedtime    ferrous sulfate 325 (65 Fe) MG tablet         Take 1 Tab by mouth every day.   Dose:  325 mg                        ibuprofen 600 MG Tabs   Last time this was given:  600 mg on 8/23/2017  7:19 AM   Commonly known as:  MOTRIN        Take 1 Tab by mouth every 6 hours as needed (For cramping after delivery; do not give if patient is receiving ketorolac (Toradol)).   Dose:  600 mg                          CONTINUE taking these medications        Instructions    Morning Afternoon Evening Bedtime    albuterol 2.5 mg/0.5 mL Nebu   Commonly known as:  PROVENTIL        by Nebulization route ONCE (RT).                        PRENATAL VITAMINS PO        Take  by mouth.                          STOP taking these medications     acetaminophen/caffeine/butalbital 325-40-50 mg -40 MG Tabs   Commonly known as:  FIORICET                    Where to Get Your Medications      Information about where to get these medications is not yet available     ! Ask your nurse or doctor about these medications    - ferrous sulfate 325 (65 Fe) MG tablet  - ibuprofen 600 MG Tabs            Crisis Hotline:     [place] Crisis Hotline:  5-625-FZGZOJI or 1-995.734.2582    [place] Crisis Hotline:    1-121.430.4461 or 907-066-2964        Disclaimer           _____________________________________                     __________       ________       Patient/Mother Signature or Legal                          Date                   Time

## 2017-08-21 NOTE — IP AVS SNAPSHOT
8/24/2017    Amada Meneses  [address]  [place] [address]    Dear Amada:    [facility] wants to ensure your discharge home is safe and you or your loved ones have had all of your questions answered regarding your care after you leave the hospital.    Below is a list of resources and contact information should you have any questions regarding your hospital stay, follow-up instructions, or active medical symptoms.    Questions or Concerns Regarding… Contact   Medical Questions Related to Your Discharge  (7 days a week, 8am-5pm) Contact a Nurse Care Coordinator   [phone]   Medical Questions Not Related to Your Discharge  (24 hours a day / 7 days a week)  Contact the Nurse Health Line   [phone]    Medications or Discharge Instructions Refer to your discharge packet   or contact your [facility] Primary Care Provider   [phone]   Follow-up Appointment(s) Schedule your appointment via ponUp   or contact Scheduling [phone]   Billing Review your statement via ponUp  or contact Billing [phone]   Medical Records Review your records via ponUp   or contact Medical Records [phone]     You may receive a telephone call within two days of discharge. This call is to make certain you understand your discharge instructions and have the opportunity to have any questions answered. You can also easily access your medical information, test results and upcoming appointments via the ponUp free online health management tool. You can learn more and sign up at [employer]ponUp. For assistance setting up your ponUp account, please call [phone].    Once again, we want to ensure your discharge home is safe and that you have a clear understanding of any next steps in your care. If you have any questions or concerns, please do not hesitate to contact us, we are here for you. Thank you for choosing [facility] for your healthcare needs.    Sincerely,    Your [facility]  Team

## 2017-08-21 NOTE — PROGRESS NOTES
EDC - 17 EGA - 40.2    1242 - Pt arrived to labor and delivery for c/o of SROM at 1155. Pt placed in room 221.  1252 - External monitors in place X2. Category I FHT at this time. BP elevated, serial bp's started. Pt has no c/o of headache, blurred vision, epigastric pain, has not had elevated BP's in the past. Pt reports good FM. No complaints of regular contractions. Reports SROM of pink/bloody fluid at 1155. SSE performed. No pooling noted. Fern specimen obtain. Small amount of pink fluid noted in speculum upon removal. SVE 4/70/-2, bag of water felt. Family at bedside. POC discussed with pt and family members, all questions answered. Pt up to bathroom, urine sample obtained.  1420 - Dr. Fabian notified of positive Fern, elevated BP's, labor status. Admit orders received.  1440 - 18g IV started on 1st attempt, labs collected. LR started at 125 ml/hr. Admit profile completed.  1635 - BP's increased. SVE-5-6/100/-1. Pt requesting IV pain medication, given see MAR.   170 - Dr. Fabian updated. Orders received.   1715 - POC discussed with patient via interpretor services #[id number], discussed increased BP's, labetalol, Magnesium, labor progression, epidural, pain management, questions answered at length.  1723 - 4g Magnesium sulfate bolus started.   1735 - dove placed to gravity with sterile technique.  1737 - 20mg Labetalol given IV slow push over 2 min.  1743 - 2g magnesium sulfate continuous infusion started. Pt updated on when she could received more pain medication. Does not require meds at this time. Will continue to monitor BP's.   185 - report given to JIAN Cain RN. POC discussed.

## 2017-08-21 NOTE — IP AVS SNAPSHOT
Joyride Access Code: Activation code not generated  Current Joyride Status: Patient Declined    Your email address is not on file at [facility].  Email Addresses are required for you to sign up for Joyride, please contact [phone] to verify your personal information and to provide your email address prior to attempting to register for Joyride.    Amada Meneses  [address]  [place] [address]    Joyride  A secure, online tool to manage your health information     [facility]’s Joyride® is a secure, online tool that connects you to your personalized health information from the privacy of your home -- day or night - making it very easy for you to manage your healthcare. Once the activation process is completed, you can even access your medical information using the Joyride eufemia, which is available for free in the Apple Eufemia store or Google Play store.     To learn more about Joyride, visit [email or web address]    There are two levels of access available (as shown below):   My Chart Features  [facility] Primary Care Doctor [facility]  Specialists [facility]  Urgent  Care Non-[facility] Primary Care Doctor   Email your healthcare team securely and privately 24/7 X X X    Manage appointments: schedule your next appointment; view details of past/upcoming appointments X      Request prescription refills. X      View recent personal medical records, including lab and immunizations X X X X   View health record, including health history, allergies, medications X X X X   Read reports about your outpatient visits, procedures, consult and ER notes X X X X   See your discharge summary, which is a recap of your hospital and/or ER visit that includes your diagnosis, lab results, and care plan X X  X     How to register for FanGager ([email or web address])t:  Once your e-mail address has been verified, follow the following steps to sign up for FanGager ([email or web address])t.     1. Go to  [email or web address]  2. Click on the Sign Up Now box, which takes  you to the New Member Sign Up page. You will need to provide the following information:  a. Enter your Pursway Access Code exactly as it appears at the top of this page. (You will not need to use this code after you’ve completed the sign-up process. If you do not sign up before the expiration date, you must request a new code.)   b. Enter your date of birth.   c. Enter your home email address.   d. Click Submit, and follow the next screen’s instructions.  3. Create a Pursway ID. This will be your Pursway login ID and cannot be changed, so think of one that is secure and easy to remember.  4. Create a Pursway password. You can change your password at any time.  5. Enter your Password Reset Question and Answer. This can be used at a later time if you forget your password.   6. Enter your e-mail address. This allows you to receive e-mail notifications when new information is available in Pursway.  7. Click Sign Up. You can now view your health information.    For assistance activating your Pursway account, call [phone]

## 2017-08-21 NOTE — H&P
History and Physical;      Amada Meneses is a 42 y.o. year old female  at 40w2d who presents for leakage of fluid and contractions    Subjective:   positive  For CTXS.   positive Feels pain   positive for LOF  negative for vaginal bleeding.   positive for fetal movement    ROS: A comprehensive review of systems was negative.    Past Medical History   Diagnosis Date   • Asthma 2016     albuterol inhaler 2 times a day   • Migraine      no medications     No past surgical history on file.  OB History    Para Term  AB SAB TAB Ectopic Multiple Living   5 3 3  1 1    2      # Outcome Date GA Lbr Shaun/2nd Weight Sex Delivery Anes PTL Lv   5 Current            4 Term 10/29/11 40w0d  2.268 kg (5 lb) F Vag-Spont None N    3 SAB 2010 12w0d             Comments: D&C   2 Term 01 40w0d  3.175 kg (7 lb) M Vag-Spont None N Y   1 Term 96 40w0d  4.082 kg (9 lb) F Vag-Spont EPI N Y        Social History     Social History   • Marital Status: Single     Spouse Name: N/A   • Number of Children: N/A   • Years of Education: N/A     Occupational History   • Not on file.     Social History Main Topics   • Smoking status: Never Smoker    • Smokeless tobacco: Never Used   • Alcohol Use: No   • Drug Use: No   • Sexual Activity:     Partners: Male      Comment: none. Planned pregnancy.      Other Topics Concern   • Not on file     Social History Narrative     Allergies: Review of patient's allergies indicates no known allergies.    Current facility-administered medications:   •  LR infusion, , Intravenous, Continuous, Shilo Fabian M.D.  •  fentaNYL (SUBLIMAZE) injection 50 mcg, 50 mcg, Intravenous, Q HOUR PRN, Shilo Fabian M.D.  •  fentaNYL (SUBLIMAZE) injection 100 mcg, 100 mcg, Intravenous, Q HOUR PRN, Shilo Fabian M.D.  •  mag hydrox-al hydrox-simeth (MAALOX PLUS ES or MYLANTA DS) suspension 30 mL, 30 mL, Oral, Q6HRS PRN, Shilo Fabian M.D.  •  oxytocin (PITOCIN) infusion  "(for induction), 0.5-20 arely-units/min, Intravenous, Continuous, Shilo Fabian M.D.  •  misoprostol (CYTOTEC) tablet 800 mcg, 800 mcg, Rectal, Once PRN, Shilo Fabian M.D.  •  methylergonovine (METHERGINE) injection 0.2 mg, 0.2 mg, Intramuscular, Once PRN, Shilo Fabian M.D.    Prenatal care with [facility] with the following problems:  Patient Active Problem List    Diagnosis Date Noted   • GBS (group B Streptococcus carrier), +RV culture, currently pregnant 07/28/2017     Priority: High   • Elderly multigravida in third trimester 03/15/2017     Priority: High   • Labor and delivery, indication for care 08/21/2017   • History of macrosomia in infant in prior pregnancy, currently pregnant in second trimester 03/15/2017   • Incontinence 03/15/2017   • Migraine without aura 03/15/2017         Objective:      Blood pressure 140/84, pulse 103, temperature 36.2 °C (97.2 °F), resp. rate 18, height 1.575 m (5' 2\"), weight 81.194 kg (179 lb), last menstrual period 11/12/2016.    General:   no acute distress   Skin:   normal   HEENT:  PERRLA   Lungs:   CTA bilateral   Heart:   brisk carotid upstroke without bruits, peripheral pulses very brisk, chest is clear without rales or wheezing, no pedal edema, no JVD, no hepatosplenomegaly   Abdomen:   gravid, NT   EFW:  3200 g   Pelvis:  Vulva and vagina appear normal. Bimanual exam reveals normal uterus and adnexa., proven to 3400 g   FHTs: 140 BPM good accels no decels good variability   Contractions: 1 contractions in 10 min moderate to palpation   Uterine Size: S=D   Presentations: Cephalic per RN    Cervix: Per RN     Dilation:  4 cm     Effacement: 75%    Station:  -1    Consistency: Soft    Position: Middle     Complete OB US  See labs    Lab Review  Lab:   Blood type: B     Recent Results (from the past 5880 hour(s))   POCT Pregnancy    Collection Time: 02/06/17 12:32 PM   Result Value Ref Range    POC Urine Pregnancy Test Positive Negative    Internal Control Positive " Positive     Internal Control Negative Negative    POCT Urinalysis    Collection Time: 03/15/17  1:53 PM   Result Value Ref Range    POC Color  Negative    POC Appearance  Negative    POC Leukocyte Esterase neg Negative    POC Nitrites neg Negative    POC Urobiligen  Negative (0.2) mg/dL    POC Protein neg Negative mg/dL    POC Urine PH 8.0 5.0 - 8.0    POC Blood neg Negative    POC Specific Gravity 1.005 <1.005 - >1.030    POC Ketones small Negative mg/dL    POC Biliruben  Negative mg/dL    POC Glucose neg Negative mg/dL   THINPREP RFLX HPV ASCUS W/CTNG    Collection Time: 03/15/17  2:36 PM   Result Value Ref Range    Cytology Reg See Path Report     Source Cervical     C. trachomatis by PCR Negative Negative    N. gonorrhoeae by PCR Negative Negative   URINETOTAL PROTEIN 24 HR    Collection Time: 03/17/17  7:04 AM   Result Value Ref Range    Total Protein, Urine 7.0 0.0 - 15.0 mg/dL    Total Volume, Urine 1425 mL    Total Protein, 24 Hour Urine 99.8 30.0 - 150.0 mg/24 Hr   GLUCOSE 1HR GESTATIONAL    Collection Time: 03/17/17 10:17 AM   Result Value Ref Range    Glucose, Post Dose 129 70 - 139 mg/dL   PREG CNTR PRENATAL PN    Collection Time: 03/17/17 10:17 AM   Result Value Ref Range    Micro Urine Req Microscopic     Color Yellow     Character Sl Cloudy (A)     Specific Gravity 1.016 <1.035    Ph 6.5 5.0-8.0    Glucose Negative Negative mg/dL    Ketones Negative Negative mg/dL    Protein Negative Negative mg/dL    Bilirubin Negative Negative    Nitrite Negative Negative    Leukocyte Esterase Negative Negative    Occult Blood Negative Negative    Culture Indicated No UA Culture    Rubella IgG Antibody 246.90 IU/mL    Syphilis, Treponemal Qual Non Reactive Non Reactive    Hepatitis B Surface Antigen Negative Negative    WBC 8.6 4.8 - 10.8 K/uL    RBC 3.75 (L) 4.20 - 5.40 M/uL    Hemoglobin 12.0 12.0 - 16.0 g/dL    Hematocrit 36.2 (L) 37.0 - 47.0 %    MCV 96.5 81.4 - 97.8 fL    MCH 32.0 27.0 - 33.0 pg    MCHC 33.1  (L) 33.6 - 35.0 g/dL    RDW 46.1 35.9 - 50.0 fL    Platelet Count 251 164 - 446 K/uL    MPV 10.0 9.0 - 12.9 fL    Neutrophils-Polys 74.40 (H) 44.00 - 72.00 %    Lymphocytes 16.90 (L) 22.00 - 41.00 %    Monocytes 5.60 0.00 - 13.40 %    Eosinophils 1.40 0.00 - 6.90 %    Basophils 0.30 0.00 - 1.80 %    Immature Granulocytes 1.40 (H) 0.00 - 0.90 %    Nucleated RBC 0.00 /100 WBC    Neutrophils (Absolute) 6.38 2.00 - 7.15 K/uL    Lymphs (Absolute) 1.45 1.00 - 4.80 K/uL    Monos (Absolute) 0.48 0.00 - 0.85 K/uL    Eos (Absolute) 0.12 0.00 - 0.51 K/uL    Baso (Absolute) 0.03 0.00 - 0.12 K/uL    Immature Granulocytes (abs) 0.12 (H) 0.00 - 0.11 K/uL    NRBC (Absolute) 0.00 K/uL   AFP TETRA    Collection Time: 03/17/17 10:17 AM   Result Value Ref Range    AFP Value -Eia 50 ng/mL    AFP MOM Value 1.19     Hcg Value 29117 IU/L    Hcg Mom 0.69     Ue3 Value 1.84 ng/mL    Ue3 Mom 1.30     Interpretation Normal     Maternal [age] at RAMEZ 40.5 yr    Gestational Age Based On LNMP     Gestational Age 17.86 weeks    Insulin Dependent Diabetes No     Race      Multiple Pregnancy One     Cecelia Value -Eia 121 pg/mL    Cecelia Mom Value 0.77     Maternal Weight 152 lbs    Err Maternal Scrn AFP See Note    HIV ANTIBODIES    Collection Time: 03/17/17 10:17 AM   Result Value Ref Range    HIV Ag/Ab Combo Assay Non Reactive Non Reactive   PREGNANCY INDUCED HYPERTENSION PROFILE-CBC W/O, BUN, CREATININE, AST, URIC ACID    Collection Time: 03/17/17 10:17 AM   Result Value Ref Range    Bun 7 (L) 8 - 22 mg/dL    Creatinine 0.69 0.50 - 1.40 mg/dL    AST(SGOT) 27 12 - 45 U/L    Uric Acid 4.1 1.9 - 8.2 mg/dL    WBC 8.3 4.8 - 10.8 K/uL    RBC 3.71 (L) 4.20 - 5.40 M/uL    Hemoglobin 12.0 12.0 - 16.0 g/dL    Hematocrit 35.8 (L) 37.0 - 47.0 %    MCV 96.5 81.4 - 97.8 fL    MCH 32.3 27.0 - 33.0 pg    MCHC 33.5 (L) 33.6 - 35.0 g/dL    RDW 45.1 35.9 - 50.0 fL    Platelet Count 244 164 - 446 K/uL    MPV 10.1 9.0 - 12.9 fL   OP PRENATAL PANEL-BLOOD BANK     Collection Time: 03/17/17 10:17 AM   Result Value Ref Range    ABO Grouping Only B     Rh Grouping Only POS     Antibody Screen Scrn NEG    URINE MICROSCOPIC (W/UA)    Collection Time: 03/17/17 10:17 AM   Result Value Ref Range    RBC 5-10 (A) /hpf    Epithelial Cells Few /hpf    Mucous Threads Few /hpf    Amorphous Crystal Present /hpf   ESTIMATED GFR    Collection Time: 03/17/17 10:17 AM   Result Value Ref Range    GFR If African American >60 >60 mL/min/1.73 m 2    GFR If Non African American >60 >60 mL/min/1.73 m 2   POCT Urinalysis    Collection Time: 05/26/17  3:27 PM   Result Value Ref Range    POC Color  Negative    POC Appearance  Negative    POC Leukocyte Esterase negative Negative    POC Nitrites negative Negative    POC Urobiligen  Negative (0.2) mg/dL    POC Protein negative Negative mg/dL    POC Urine PH 7.5 5.0 - 8.0    POC Blood negative Negative    POC Specific Gravity 1.005 <1.005 - >1.030    POC Ketones negative Negative mg/dL    POC Biliruben  Negative mg/dL    POC Glucose negative Negative mg/dL   GLUCOSE 1HR GESTATIONAL    Collection Time: 07/07/17 11:51 AM   Result Value Ref Range    Glucose, Post Dose 136 70 - 139 mg/dL   HCT    Collection Time: 07/07/17 11:51 AM   Result Value Ref Range    Hematocrit 34.0 (L) 37.0 - 47.0 %   HGB    Collection Time: 07/07/17 11:51 AM   Result Value Ref Range    Hemoglobin 11.4 (L) 12.0 - 16.0 g/dL   T.PALLIDUM AB EIA    Collection Time: 07/07/17 11:51 AM   Result Value Ref Range    Syphilis, Treponemal Qual Non Reactive Non Reactive   GRP B STREP, BY PCR ([facility])    Collection Time: 07/21/17  1:02 PM   Result Value Ref Range    Strep Gp B DNA PCR POSITIVE (A) Negative   FERN TEST    Collection Time: 08/21/17  1:00 PM   Result Value Ref Range    Fern Test On Amniotic Fluid see below Not present   POC UA    Collection Time: 08/21/17  1:26 PM   Result Value Ref Range    POC Color Yellow     POC Appearance Slightly Cloudy (A)     POC Glucose Negative Negative  mg/dL    POC Ketones Negative Negative mg/dL    POC Specific Gravity 1.015 1.005-1.030    POC Blood Large (A) Negative    POC Urine PH 7.0 5.0-8.0    POC Protein Negative Negative mg/dL    POC Nitrites Negative Negative    POC Leukocyte Esterase Negative Negative        Assessment:   1.  IUP at 40w2d  2.  Labor status: Early latent labor.  3.  Cat 1 FHTs  4.  Obstetrical history significant for   Patient Active Problem List    Diagnosis Date Noted   • GBS (group B Streptococcus carrier), +RV culture, currently pregnant 07/28/2017     Priority: High   • Elderly multigravida in third trimester 03/15/2017     Priority: High   • Labor and delivery, indication for care 08/21/2017   • History of macrosomia in infant in prior pregnancy, currently pregnant in second trimester 03/15/2017   • Incontinence 03/15/2017   • Migraine without aura 03/15/2017   .      Plan:     Admit to L&D  GBS negative  Routine labs  Pain management prn  Pitocin augmentation of labor

## 2017-08-22 LAB
BASOPHILS # BLD AUTO: 0.1 % (ref 0–1.8)
BASOPHILS # BLD AUTO: 0.2 % (ref 0–1.8)
BASOPHILS # BLD: 0.02 K/UL (ref 0–0.12)
BASOPHILS # BLD: 0.05 K/UL (ref 0–0.12)
EOSINOPHIL # BLD AUTO: 0 K/UL (ref 0–0.51)
EOSINOPHIL # BLD AUTO: 0.03 K/UL (ref 0–0.51)
EOSINOPHIL NFR BLD: 0 % (ref 0–6.9)
EOSINOPHIL NFR BLD: 0.2 % (ref 0–6.9)
ERYTHROCYTE [DISTWIDTH] IN BLOOD BY AUTOMATED COUNT: 42.3 FL (ref 35.9–50)
ERYTHROCYTE [DISTWIDTH] IN BLOOD BY AUTOMATED COUNT: 43.8 FL (ref 35.9–50)
ERYTHROCYTE [DISTWIDTH] IN BLOOD BY AUTOMATED COUNT: 44 FL (ref 35.9–50)
HCT VFR BLD AUTO: 21.5 % (ref 37–47)
HCT VFR BLD AUTO: 21.6 % (ref 37–47)
HCT VFR BLD AUTO: 25.6 % (ref 37–47)
HGB BLD-MCNC: 7.3 G/DL (ref 12–16)
HGB BLD-MCNC: 7.4 G/DL (ref 12–16)
HGB BLD-MCNC: 8.8 G/DL (ref 12–16)
IMM GRANULOCYTES # BLD AUTO: 0.14 K/UL (ref 0–0.11)
IMM GRANULOCYTES # BLD AUTO: 0.14 K/UL (ref 0–0.11)
IMM GRANULOCYTES NFR BLD AUTO: 0.7 % (ref 0–0.9)
IMM GRANULOCYTES NFR BLD AUTO: 1 % (ref 0–0.9)
LYMPHOCYTES # BLD AUTO: 1.04 K/UL (ref 1–4.8)
LYMPHOCYTES # BLD AUTO: 1.98 K/UL (ref 1–4.8)
LYMPHOCYTES NFR BLD: 14.7 % (ref 22–41)
LYMPHOCYTES NFR BLD: 5.1 % (ref 22–41)
MAGNESIUM SERPL-MCNC: 5.5 MG/DL (ref 1.5–2.5)
MAGNESIUM SERPL-MCNC: 5.7 MG/DL (ref 1.5–2.5)
MAGNESIUM SERPL-MCNC: 6.1 MG/DL (ref 1.5–2.5)
MCH RBC QN AUTO: 32.2 PG (ref 27–33)
MCH RBC QN AUTO: 32.9 PG (ref 27–33)
MCH RBC QN AUTO: 33.3 PG (ref 27–33)
MCHC RBC AUTO-ENTMCNC: 34 G/DL (ref 33.6–35)
MCHC RBC AUTO-ENTMCNC: 34 G/DL (ref 33.6–35)
MCHC RBC AUTO-ENTMCNC: 34.3 G/DL (ref 33.6–35)
MCV RBC AUTO: 94.8 FL (ref 81.4–97.8)
MCV RBC AUTO: 96.8 FL (ref 81.4–97.8)
MCV RBC AUTO: 97.3 FL (ref 81.4–97.8)
MONOCYTES # BLD AUTO: 0.75 K/UL (ref 0–0.85)
MONOCYTES # BLD AUTO: 1.2 K/UL (ref 0–0.85)
MONOCYTES NFR BLD AUTO: 3.7 % (ref 0–13.4)
MONOCYTES NFR BLD AUTO: 8.9 % (ref 0–13.4)
NEUTROPHILS # BLD AUTO: 10.11 K/UL (ref 2–7.15)
NEUTROPHILS # BLD AUTO: 18.5 K/UL (ref 2–7.15)
NEUTROPHILS NFR BLD: 75.1 % (ref 44–72)
NEUTROPHILS NFR BLD: 90.3 % (ref 44–72)
NRBC # BLD AUTO: 0 K/UL
NRBC # BLD AUTO: 0 K/UL
NRBC BLD AUTO-RTO: 0 /100 WBC
NRBC BLD AUTO-RTO: 0 /100 WBC
PLATELET # BLD AUTO: 188 K/UL (ref 164–446)
PLATELET # BLD AUTO: 191 K/UL (ref 164–446)
PLATELET # BLD AUTO: 224 K/UL (ref 164–446)
PMV BLD AUTO: 10.3 FL (ref 9–12.9)
PMV BLD AUTO: 10.5 FL (ref 9–12.9)
PMV BLD AUTO: 9.9 FL (ref 9–12.9)
RBC # BLD AUTO: 2.22 M/UL (ref 4.2–5.4)
RBC # BLD AUTO: 2.22 M/UL (ref 4.2–5.4)
RBC # BLD AUTO: 2.67 M/UL (ref 4.2–5.4)
WBC # BLD AUTO: 13.5 K/UL (ref 4.8–10.8)
WBC # BLD AUTO: 15.6 K/UL (ref 4.8–10.8)
WBC # BLD AUTO: 20.5 K/UL (ref 4.8–10.8)

## 2017-08-22 PROCEDURE — 85025 COMPLETE CBC W/AUTO DIFF WBC: CPT

## 2017-08-22 PROCEDURE — 304965 HCHG RECOVERY SERVICES

## 2017-08-22 PROCEDURE — 700102 HCHG RX REV CODE 250 W/ 637 OVERRIDE(OP): Performed by: OBSTETRICS & GYNECOLOGY

## 2017-08-22 PROCEDURE — 83735 ASSAY OF MAGNESIUM: CPT

## 2017-08-22 PROCEDURE — A9270 NON-COVERED ITEM OR SERVICE: HCPCS | Performed by: NURSE PRACTITIONER

## 2017-08-22 PROCEDURE — A9270 NON-COVERED ITEM OR SERVICE: HCPCS

## 2017-08-22 PROCEDURE — 700111 HCHG RX REV CODE 636 W/ 250 OVERRIDE (IP): Performed by: OBSTETRICS & GYNECOLOGY

## 2017-08-22 PROCEDURE — 700111 HCHG RX REV CODE 636 W/ 250 OVERRIDE (IP): Performed by: NURSE PRACTITIONER

## 2017-08-22 PROCEDURE — 59409 OBSTETRICAL CARE: CPT

## 2017-08-22 PROCEDURE — 700112 HCHG RX REV CODE 229: Performed by: NURSE PRACTITIONER

## 2017-08-22 PROCEDURE — 36415 COLL VENOUS BLD VENIPUNCTURE: CPT

## 2017-08-22 PROCEDURE — 85027 COMPLETE CBC AUTOMATED: CPT

## 2017-08-22 PROCEDURE — A9270 NON-COVERED ITEM OR SERVICE: HCPCS | Performed by: OBSTETRICS & GYNECOLOGY

## 2017-08-22 PROCEDURE — 700102 HCHG RX REV CODE 250 W/ 637 OVERRIDE(OP): Performed by: NURSE PRACTITIONER

## 2017-08-22 PROCEDURE — 770002 HCHG ROOM/CARE - OB PRIVATE (112)

## 2017-08-22 PROCEDURE — 700105 HCHG RX REV CODE 258: Performed by: NURSE PRACTITIONER

## 2017-08-22 PROCEDURE — 0HQ9XZZ REPAIR PERINEUM SKIN, EXTERNAL APPROACH: ICD-10-PCS | Performed by: OBSTETRICS & GYNECOLOGY

## 2017-08-22 PROCEDURE — 700102 HCHG RX REV CODE 250 W/ 637 OVERRIDE(OP)

## 2017-08-22 RX ORDER — SODIUM CHLORIDE, SODIUM LACTATE, POTASSIUM CHLORIDE, CALCIUM CHLORIDE 600; 310; 30; 20 MG/100ML; MG/100ML; MG/100ML; MG/100ML
INJECTION, SOLUTION INTRAVENOUS CONTINUOUS
Status: DISCONTINUED | OUTPATIENT
Start: 2017-08-22 | End: 2017-08-23 | Stop reason: ALTCHOICE

## 2017-08-22 RX ORDER — BISACODYL 10 MG
10 SUPPOSITORY, RECTAL RECTAL PRN
Status: DISCONTINUED | OUTPATIENT
Start: 2017-08-22 | End: 2017-08-24 | Stop reason: HOSPADM

## 2017-08-22 RX ORDER — IBUPROFEN 600 MG/1
600 TABLET ORAL EVERY 6 HOURS PRN
Status: DISCONTINUED | OUTPATIENT
Start: 2017-08-22 | End: 2017-08-24 | Stop reason: HOSPADM

## 2017-08-22 RX ORDER — ACETAMINOPHEN 325 MG/1
325 TABLET ORAL EVERY 4 HOURS PRN
Status: DISCONTINUED | OUTPATIENT
Start: 2017-08-22 | End: 2017-08-24 | Stop reason: HOSPADM

## 2017-08-22 RX ORDER — DOCUSATE SODIUM 100 MG/1
100 CAPSULE, LIQUID FILLED ORAL 2 TIMES DAILY PRN
Status: DISCONTINUED | OUTPATIENT
Start: 2017-08-22 | End: 2017-08-24 | Stop reason: HOSPADM

## 2017-08-22 RX ORDER — OXYCODONE HYDROCHLORIDE AND ACETAMINOPHEN 5; 325 MG/1; MG/1
1 TABLET ORAL EVERY 4 HOURS PRN
Status: DISCONTINUED | OUTPATIENT
Start: 2017-08-22 | End: 2017-08-24 | Stop reason: HOSPADM

## 2017-08-22 RX ORDER — MISOPROSTOL 200 UG/1
800 TABLET ORAL
Status: DISCONTINUED | OUTPATIENT
Start: 2017-08-22 | End: 2017-08-24 | Stop reason: HOSPADM

## 2017-08-22 RX ORDER — VITAMIN A ACETATE, BETA CAROTENE, ASCORBIC ACID, CHOLECALCIFEROL, .ALPHA.-TOCOPHEROL ACETATE, DL-, THIAMINE MONONITRATE, RIBOFLAVIN, NIACINAMIDE, PYRIDOXINE HYDROCHLORIDE, FOLIC ACID, CYANOCOBALAMIN, CALCIUM CARBONATE, FERROUS FUMARATE, ZINC OXIDE, CUPRIC OXIDE 3080; 12; 120; 400; 1; 1.84; 3; 20; 22; 920; 25; 200; 27; 10; 2 [IU]/1; UG/1; MG/1; [IU]/1; MG/1; MG/1; MG/1; MG/1; MG/1; [IU]/1; MG/1; MG/1; MG/1; MG/1; MG/1
1 TABLET, FILM COATED ORAL EVERY MORNING
Status: DISCONTINUED | OUTPATIENT
Start: 2017-08-22 | End: 2017-08-24 | Stop reason: HOSPADM

## 2017-08-22 RX ORDER — SODIUM CHLORIDE, SODIUM LACTATE, POTASSIUM CHLORIDE, CALCIUM CHLORIDE 600; 310; 30; 20 MG/100ML; MG/100ML; MG/100ML; MG/100ML
INJECTION, SOLUTION INTRAVENOUS
Status: ACTIVE
Start: 2017-08-22 | End: 2017-08-22

## 2017-08-22 RX ORDER — MISOPROSTOL 200 UG/1
TABLET ORAL
Status: COMPLETED
Start: 2017-08-22 | End: 2017-08-22

## 2017-08-22 RX ORDER — OXYCODONE HYDROCHLORIDE 10 MG/1
10 TABLET ORAL EVERY 4 HOURS PRN
Status: DISCONTINUED | OUTPATIENT
Start: 2017-08-22 | End: 2017-08-24 | Stop reason: HOSPADM

## 2017-08-22 RX ORDER — MAGNESIUM SULFATE HEPTAHYDRATE 40 MG/ML
2 INJECTION, SOLUTION INTRAVENOUS CONTINUOUS
Status: DISCONTINUED | OUTPATIENT
Start: 2017-08-22 | End: 2017-08-23 | Stop reason: ALTCHOICE

## 2017-08-22 RX ORDER — SODIUM CHLORIDE, SODIUM LACTATE, POTASSIUM CHLORIDE, CALCIUM CHLORIDE 600; 310; 30; 20 MG/100ML; MG/100ML; MG/100ML; MG/100ML
INJECTION, SOLUTION INTRAVENOUS PRN
Status: DISCONTINUED | OUTPATIENT
Start: 2017-08-22 | End: 2017-08-24 | Stop reason: HOSPADM

## 2017-08-22 RX ADMIN — MISOPROSTOL 200 MCG: 200 TABLET ORAL at 02:55

## 2017-08-22 RX ADMIN — DOCUSATE SODIUM 100 MG: 100 CAPSULE ORAL at 09:18

## 2017-08-22 RX ADMIN — MAGNESIUM SULFATE IN WATER 2 G/HR: 40 INJECTION, SOLUTION INTRAVENOUS at 13:50

## 2017-08-22 RX ADMIN — OXYCODONE HYDROCHLORIDE AND ACETAMINOPHEN 1 TABLET: 5; 325 TABLET ORAL at 09:19

## 2017-08-22 RX ADMIN — OXYCODONE HYDROCHLORIDE 10 MG: 10 TABLET ORAL at 01:52

## 2017-08-22 RX ADMIN — SODIUM CHLORIDE, POTASSIUM CHLORIDE, SODIUM LACTATE AND CALCIUM CHLORIDE: 600; 310; 30; 20 INJECTION, SOLUTION INTRAVENOUS at 10:37

## 2017-08-22 RX ADMIN — Medication 2000 ML/HR: at 00:45

## 2017-08-22 RX ADMIN — MISOPROSTOL 800 MCG: 200 TABLET ORAL at 00:22

## 2017-08-22 RX ADMIN — Medication 75 ML/HR: at 01:21

## 2017-08-22 RX ADMIN — Medication 1 TABLET: at 09:18

## 2017-08-22 RX ADMIN — IBUPROFEN 600 MG: 600 TABLET, FILM COATED ORAL at 23:37

## 2017-08-22 RX ADMIN — IBUPROFEN 600 MG: 600 TABLET, FILM COATED ORAL at 09:18

## 2017-08-22 RX ADMIN — MAGNESIUM SULFATE IN WATER 2 G/HR: 40 INJECTION, SOLUTION INTRAVENOUS at 03:45

## 2017-08-22 ASSESSMENT — PAIN SCALES - GENERAL
PAINLEVEL_OUTOF10: 1
PAINLEVEL_OUTOF10: 0
PAINLEVEL_OUTOF10: 0
PAINLEVEL_OUTOF10: 7
PAINLEVEL_OUTOF10: 7
PAINLEVEL_OUTOF10: 3

## 2017-08-22 NOTE — CARE PLAN
Problem: Pain  Goal: Alleviation of Pain or a reduction in pain to the patient’s comfort goal  Outcome: PROGRESSING AS EXPECTED  Pt educated of different types of pain management options during labor. Pt requesting IV pain medications for pain during labor.     Problem: Risk for Infection, Impaired Wound Healing  Goal: Remain free from signs and symptoms of infection  Outcome: PROGRESSING AS EXPECTED  Pt afebrile. No s/s of infection noted.

## 2017-08-22 NOTE — PROGRESS NOTES
185: Report received from JOSH Hadley RN. POC discussed. Encouraged pt to call with needs.   : RYAN Cho CNM at bedside. SVE by provider. 100/-1.   : Pt c/o pressure. SVE by RN. 100/-1   : SVE by RN. 100/0  2328: RYAN Cho CNM called with update. Provider ordering D5LR to be started.   : SVE by RN. Pt complete/+1. RYAN Cho CNM notified.   0011:  of viable, female infant. Apgars 7/9  0021: Placenta delivered. S/I.   0022: 800 mcg of cytotec given by RYAN Cho CNM  0206: RYAN Cho CNM notified of pt's recent bleeding and fundal check. No new orders received. Report given to MAURO Mares RN. POC discussed with pt and family.   0245: Care resumed on pt. POC discussed. RYAN Cho CNM notified of pt's most recent bleeding and fundal check. Provider to come evaluate pt.   0248: RYAN Cho CNM at bedside. Bimanual exam performed by provider.   0252: Dr. Fabian consulted by RYAN Cho CNM. MD ordering 400 mcg cytotec be given.   0308: Sueash from lab called with critical magnesium level of 5.5 Value repeated back to .   0320: RYAN Cho CNM on unit. Provider notified of pt's most recent lab results and current blood loss total after delivery.   0355: RYAN Cho CNM on unit. Provider notified of pt's most recent exam. Provider ordering for oxytocin rate to be changed to 125mL/hr.  0515: RYAN Cho CNM on unit. Status update given on pt. Provider ordering for pt to transferred to postpartum.   0535: Pt and infant transferred to postpartum in stable condition. Report given to MAURO Lozada RN. ID bands verified. Cuddles activated.

## 2017-08-22 NOTE — CARE PLAN
Problem: Potential knowledge deficit related to lack of understanding of self and  care  Goal: Patient will demonstrate ability to care for self and infant  Outcome: PROGRESSING SLOWER THAN EXPECTED  Currently on magnesium therapy.  Will require assistance with infant for the first 24 hours

## 2017-08-22 NOTE — CONSULTS
Mom  other three children for 1 year, all birthed in [facility].  Had requested formula earlier because she was tired.  Discussed establishing breastfeeding and today we would work with doing that instead of formula unless medically needed.  Mom is comfortable with that approach.  Latched infant in football position, sustained a deep latch.  To call if breastfeeding  assistance needed.

## 2017-08-22 NOTE — PROCEDURES
Delivery Note    PATIENT ID:  NAME:  Amada Meneses  MRN:               [id number]  YOB: 1975    Labor Course  Pt was admitted for SROM and contractions.  Pt progressed well with pitocin augmentation.  Thick MSAF was noted.  RT at delivery. She had elevated BPs and is on magnesium sulfate. She also received antibiotics prior to delivery for GBS positive status.    On 2017  at 00:11, this 42 y.o.,  40w3d now   , GBS positive female delivered via OA/ under no anesthesia a viable female infant weighing 8 lbs and 6 oz with APGAR scores of 7 and 9 at one and five minutes. Nuchal cord x 1, easily reduced. Immediate cord clamping occurred with cord doubly clamped by myself and cut by myself.  Cord gases obtained. Baby to RT.  Mouth and nares bulb suctioned by RT present at delivery for MSAF.  Pitocin infusing in IVF.  Spontaneous delivery of Jose placenta grossly intact @ 00:21.  CVx3. FF and bleeding small.  Upon vaginal exam, there was small 1st degree laceration which was repaired using 3.0 vicryl in the usual sterile fashion. Pt and infant are stable and bonding.  Lap count: none as none were used during the procedure.  Estimated blood loss: less than 500mL.  800mcg cytotec placed SD.  Will continue magnesium sulfate for 24hrs PP.      Emerald Cho, PASHA, APN  Dr. Fabian, attending physician

## 2017-08-22 NOTE — PROGRESS NOTES
S: Pt is in bed, denies dizziness, just feeling some uterine cramping.  Asked to evaluate bleeding by RN.     O:  BP: 112/68 HR: 115 SpO2: 96%        Fundus 1FB below Umbilicus        Ryan in place and draining urine         Bimanual massage revealed 125mL clots, minimal bleeding following.    A/P:    1.  S/P  @ 0011  2.  PPH - stat CBC is pending.  3.  Will give another dose of 400mcg cytotec MN  4.  Elevated BPs - will continue magnesium sulfate for 24 hr PP    Emerald Cho, JEFFM, APN  Consulted w/ Dr. Fabian -- attending physician

## 2017-08-22 NOTE — PROGRESS NOTES
Patient presents to postpartum unit via wheelchair with infant in arms.  Infant handed to family and patient transfers to bed with standby assist.  Patient appears pale and somber.  Denies pain at this time. IV fluids and Magnesium placed to pumps per order and verified by 2 RN's.  Continuous pulse oximeter on and dove catheter to dependent drainage, draining clear yellow urine.  Bedside report received from off going RN, plan of are discussed, questions answered and understanding verbalized with the help of her 20 year old daughter who is at bedside.  Patient refusing pain meds at this time and will ask as she desires.  She would like to rest and have infant go to [facility] for baby and Hep B vaccination.  Oriented her to room, call light, care board, emergency light and brigid light.  Ehcouraged them to call for assistance or with questions, comments or concerns.

## 2017-08-22 NOTE — CARE PLAN
Problem: Altered physiologic condition related to immediate post-delivery state and potential for bleeding/hemorrhage  Goal: Patient physiologically stable as evidenced by normal lochia, palpable uterine involution and vital signs within normal limits  Outcome: PROGRESSING AS EXPECTED  Assess lochia every 4 hours followed by every 12 and as indicated.  Draw CBC at 0800

## 2017-08-22 NOTE — PROGRESS NOTES
Patient ambulated to the bathroom , dewey care done, Complained of being dizzy. Dr Blake notified and given results of CBC, with repeat CBC order.

## 2017-08-23 LAB
MAGNESIUM SERPL-MCNC: 4.5 MG/DL (ref 1.5–2.5)
MAGNESIUM SERPL-MCNC: 6.1 MG/DL (ref 1.5–2.5)

## 2017-08-23 PROCEDURE — 83735 ASSAY OF MAGNESIUM: CPT

## 2017-08-23 PROCEDURE — A9270 NON-COVERED ITEM OR SERVICE: HCPCS | Performed by: NURSE PRACTITIONER

## 2017-08-23 PROCEDURE — 36415 COLL VENOUS BLD VENIPUNCTURE: CPT

## 2017-08-23 PROCEDURE — 770002 HCHG ROOM/CARE - OB PRIVATE (112)

## 2017-08-23 PROCEDURE — 700102 HCHG RX REV CODE 250 W/ 637 OVERRIDE(OP): Performed by: OBSTETRICS & GYNECOLOGY

## 2017-08-23 PROCEDURE — 700102 HCHG RX REV CODE 250 W/ 637 OVERRIDE(OP): Performed by: NURSE PRACTITIONER

## 2017-08-23 PROCEDURE — A9270 NON-COVERED ITEM OR SERVICE: HCPCS | Performed by: OBSTETRICS & GYNECOLOGY

## 2017-08-23 RX ADMIN — Medication 1 TABLET: at 07:19

## 2017-08-23 RX ADMIN — IBUPROFEN 600 MG: 600 TABLET, FILM COATED ORAL at 07:19

## 2017-08-23 ASSESSMENT — PAIN SCALES - GENERAL
PAINLEVEL_OUTOF10: 0
PAINLEVEL_OUTOF10: 0
PAINLEVEL_OUTOF10: 4
PAINLEVEL_OUTOF10: 0
PAINLEVEL_OUTOF10: 1
PAINLEVEL_OUTOF10: 0

## 2017-08-23 NOTE — PROGRESS NOTES
"Follow-up visit. MOB has infant in cradle hold on right side. Infant is asleep, with nipple in its mouth. With translation assistance of her older son, asked MOB if she has any pain with latch. MOB states in Greek \"A Little bit.\" Attempted to relatch infant with wider mouth, and encouraged a deeper and more areola with latch, so infant is not just on the nipple. MOB appeared sleepy, and encouraged to put infant in bassinet if MOB is sleepy.                "

## 2017-08-23 NOTE — PROGRESS NOTES
Received report from night shift RN. Assumed care of patient. Pt assessed and stable. VSS. Patient reports 4/10 pain at this time.  Administered medication for pain.  Discussed plan of care for day with patient and received verbal understanding. Call light within reach, bed in low position.  Educated pt re fall precautions and received verbal understanding.  However, pt continues to refuse bed alarms.  Pt is alert, oriented, steady on feet and calls appropriately.

## 2017-08-23 NOTE — PROGRESS NOTES
Ana Patricia R.N. Lactation Consultant Signed  Progress Notes 8/23/2017 11:45 AM      Expand All Collapse All    Lactation note:    Mom Peruvian speaking only. Translation done by grown son. 4th child, nursed others.Mom complains of sore nipples. Baby is latched to right breast,cradle hold. Latch somewhat shallow. Detached baby. Nipple rounded. Examined mouth. Tongue cups, w/upward motion. Baby relatched unable to hold up breast. Showed mother cross cradle so she could hold breast and maintain latch. Position ended up as laidback position. Baby strongly sucking and Mom says she has less pain. Offered follow up as needed. Mey RN, IBCLC

## 2017-08-23 NOTE — PROGRESS NOTES
Received report from Sarah Naegele, RN; Assumed care of pt.    2000- POC discussed with pt and opportunity provided for questions.  Answers given to questions and pt verbalized understanding of information provided to her.  Denies having any further questions at this time.  No signs of distress observed in pt.  FOB at bedside for support.  Will continue to monitor per hospital policy.  Pt requests to receive pain medication on a prn basis and will call RN if pain medication is needed.    0115- Received phone call from Isabelle from the lab with critical magnesium level of 6.1.  Critical result read back to lab.  This result falls within the parameters set forth by MD in magnesium order set for therapeutic level.

## 2017-08-23 NOTE — CARE PLAN
Problem: Communication  Goal: The ability to communicate needs accurately and effectively will improve  Patient is mostly Icelandic speaking.  Family at bedside.  Translation services used with mobile ipad.    Problem: Infection  Goal: Will remain free from infection  Patient is GBBS positive.  Received 3 doses of Penicillin G.  No s/s of infection at this time.  Afebrile.  No complaints of chills.    Problem: Bowel/Gastric:  Goal: Normal bowel function is maintained or improved  Patient has not had a bowel movement since prior to delivery.  PRN stool softeners available.    Problem: Discharge Barriers/Planning  Goal: Patient’s continuum of care needs will be met  Patient is not medically cleared for discharge.  GBBS positive.  Will require 1 more day in the hospital.

## 2017-08-23 NOTE — PROGRESS NOTES
Post Partum Progress Note    Name:   Amada Meneses   Date/Time:  8/23/2017 - 6:49 AM  Chief Admitting Dx:  Pregnancy  Labor and delivery, indication for care  Delivery Type:  vaginal, spontaneous  Post-Op/Post Partum Days #:  1    Subjective:  Abdominal pain: yes  Ambulating:   yes  Tolerating liquids:  yes  Tolerating food:  yes  Flatus:   yes  BM:    no  Bleeding:   with a small amount of bleeding  Voiding:   yes  Dizziness:   no  Feeding:   breast    Filed Vitals:    08/22/17 2300 08/23/17 0000 08/23/17 0200 08/23/17 0400   BP: 103/65 116/59  112/60   Pulse: 100 102 98 79   Temp:  37 °C (98.6 °F)  36.2 °C (97.2 °F)   TempSrc:       Resp: 20 18 18 19   Height:       Weight:       SpO2: 98% 95% 98% 97%       Exam:  Breast: Tenderness yes  Abdomen: Abdomen soft, non-tender. BS normal. No masses,  No organomegaly  Fundal Tenderness:  no  Fundus Firm: yes  Incision: none  Below umbilicus: yes  Perineum: perineum intact  Lochia: mild  Extremities: Normal extremities, peripheral pulses and reflexes normal    Meds:  Current Facility-Administered Medications   Medication Dose   • ibuprofen (MOTRIN) tablet 600 mg  600 mg   • acetaminophen (TYLENOL) tablet 325 mg  325 mg   • oxycodone-acetaminophen (PERCOCET) 5-325 MG per tablet 1 Tab  1 Tab   • oxycodone immediate release (ROXICODONE) tablet 10 mg  10 mg   • LR infusion     • PRN oxytocin (PITOCIN) (20 Units/1000 mL) PRN for excessive uterine bleeding - See Admin Instr  125-999 mL/hr   • misoprostol (CYTOTEC) tablet 800 mcg  800 mcg   • docusate sodium (COLACE) capsule 100 mg  100 mg   • bisacodyl (DULCOLAX) suppository 10 mg  10 mg   • prenatal plus vitamin (STUARTNATAL 1+1) 27-1 MG tablet 1 Tab  1 Tab   • lactated ringers infusion     • magnesium sulfate 20 g/500mL infusion  2 g/hr   • ondansetron (ZOFRAN ODT) dispertab 4 mg  4 mg    Or   • ondansetron (ZOFRAN) syringe/vial injection 4 mg  4 mg   • oxytocin (PITOCIN) infusion (for postpartum)    mL/hr       Labs:   Recent Labs      08/22/17   0237  08/22/17   0845  08/22/17   1643   WBC  20.5*  15.6*  13.5*   RBC  2.67*  2.22*  2.22*   HEMOGLOBIN  8.8*  7.3*  7.4*   HEMATOCRIT  25.6*  21.5*  21.6*   MCV  94.8  96.8  97.3   MCH  32.2  32.9  33.3*   MCHC  34.0  34.0  34.3   RDW  42.3  44.0  43.8   PLATELETCT  224  191  188   MPV  10.3  10.5  9.9       Assessment:  Chief Admitting Dx:  Pregnancy  Labor and delivery, indication for care  Delivery Type:  vaginal, spontaneous  Tubal Ligation:  no    Plan:  Continue routine post partum care.  Severe anemia clinically stable, monitor for signs of anemia d/c possible need for blood transfusion    Bhumika Mccurdy M.D.

## 2017-08-24 VITALS
WEIGHT: 179 LBS | RESPIRATION RATE: 20 BRPM | SYSTOLIC BLOOD PRESSURE: 105 MMHG | HEIGHT: 62 IN | OXYGEN SATURATION: 97 % | TEMPERATURE: 98.5 F | HEART RATE: 80 BPM | DIASTOLIC BLOOD PRESSURE: 62 MMHG | BODY MASS INDEX: 32.94 KG/M2

## 2017-08-24 LAB
BASOPHILS # BLD AUTO: 0.3 % (ref 0–1.8)
BASOPHILS # BLD: 0.04 K/UL (ref 0–0.12)
EOSINOPHIL # BLD AUTO: 0.21 K/UL (ref 0–0.51)
EOSINOPHIL NFR BLD: 1.7 % (ref 0–6.9)
ERYTHROCYTE [DISTWIDTH] IN BLOOD BY AUTOMATED COUNT: 46.3 FL (ref 35.9–50)
HCT VFR BLD AUTO: 21 % (ref 37–47)
HGB BLD-MCNC: 7 G/DL (ref 12–16)
IMM GRANULOCYTES # BLD AUTO: 0.17 K/UL (ref 0–0.11)
IMM GRANULOCYTES NFR BLD AUTO: 1.3 % (ref 0–0.9)
LYMPHOCYTES # BLD AUTO: 2.42 K/UL (ref 1–4.8)
LYMPHOCYTES NFR BLD: 19.1 % (ref 22–41)
MCH RBC QN AUTO: 33 PG (ref 27–33)
MCHC RBC AUTO-ENTMCNC: 33.3 G/DL (ref 33.6–35)
MCV RBC AUTO: 99.1 FL (ref 81.4–97.8)
MONOCYTES # BLD AUTO: 0.89 K/UL (ref 0–0.85)
MONOCYTES NFR BLD AUTO: 7 % (ref 0–13.4)
NEUTROPHILS # BLD AUTO: 8.95 K/UL (ref 2–7.15)
NEUTROPHILS NFR BLD: 70.6 % (ref 44–72)
NRBC # BLD AUTO: 0 K/UL
NRBC BLD AUTO-RTO: 0 /100 WBC
PLATELET # BLD AUTO: 229 K/UL (ref 164–446)
PMV BLD AUTO: 9.9 FL (ref 9–12.9)
RBC # BLD AUTO: 2.12 M/UL (ref 4.2–5.4)
WBC # BLD AUTO: 12.7 K/UL (ref 4.8–10.8)

## 2017-08-24 PROCEDURE — 85025 COMPLETE CBC W/AUTO DIFF WBC: CPT

## 2017-08-24 PROCEDURE — 36415 COLL VENOUS BLD VENIPUNCTURE: CPT

## 2017-08-24 RX ORDER — FERROUS SULFATE 325(65) MG
325 TABLET ORAL DAILY
Qty: 30 TAB | Refills: 1 | Status: SHIPPED | OUTPATIENT
Start: 2017-08-24 | End: 2021-03-30

## 2017-08-24 RX ORDER — IBUPROFEN 600 MG/1
600 TABLET ORAL EVERY 6 HOURS PRN
Qty: 30 TAB | Refills: 0 | Status: SHIPPED | OUTPATIENT
Start: 2017-08-24 | End: 2021-03-30

## 2017-08-24 ASSESSMENT — LIFESTYLE VARIABLES: EVER_SMOKED: NEVER

## 2017-08-24 ASSESSMENT — PAIN SCALES - GENERAL
PAINLEVEL_OUTOF10: 1
PAINLEVEL_OUTOF10: 1
PAINLEVEL_OUTOF10: 0
PAINLEVEL_OUTOF10: 1

## 2017-08-24 NOTE — PROGRESS NOTES
1040-Discharge education discussed with patient via . Patient verbalized understanding.  Prescriptions handed to patient.  Patient verbalized understanding of her follow up appointments for herself and infant.

## 2017-08-24 NOTE — DISCHARGE INSTRUCTIONS
POSTPARTUM DISCHARGE INSTRUCTIONS FOR MOM    YOB: 1975   Age: 42 y.o.               Admit Date: 8/21/2017     Discharge Date: 8/24/2017  Attending Doctor:  Shilo Fabian M.D.                  Allergies:  Review of patient's allergies indicates no known allergies.    Discharged to home by car. Discharged via wheelchair, hospital escort: Yes.  Special equipment needed: Not Applicable  Belongings with: Personal  Be sure to schedule a follow-up appointment with your primary care doctor or any specialists as instructed.     Discharge Plan:   Diet Plan: Discussed  Activity Level: Discussed  Confirmed Follow up Appointment: Patient to Call and Schedule Appointment  Confirmed Symptoms Management: Discussed  Medication Reconciliation Updated: Yes  Influenza Vaccine Indication: Indicated: Not available from distributor/    REASONS TO CALL YOUR OBSTETRICIAN:  1.   Persistent fever or shaking chills (Temperature higher than 100.4)  2.   Heavy bleeding (soaking more than 1 pad per hour); Passing clots  3.   Foul odor from vagina  4.   Mastitis (Breast infection; breast pain, chills, fever, redness)  5.   Urinary pain, burning or frequency  6.   Episiotomy infection  7.   Severe depression longer than 24 hours    HAND WASHING  · Prior to handling the baby.  · Before breastfeeding or bottle feeding baby.  · After using the bathroom or changing the baby's diaper.      VAGINAL CARE  · Nothing inside vagina for 6 weeks: no sexual intercourse, tampons or douching.  · Bleeding may continue for 2-4 weeks.  Amount may vary.    · Call your physician for heavy bleeding which means soaking more than 1 pad per hour    BIRTH CONTROL  · It is possible to become pregnant at any time after delivery and while breastfeeding.  · Plan to discuss a method of birth control with your physician at your follow up visit. visit.    DIET AND ELIMINATION  · Eating more fiber (bran cereal, fruits, and vegetables) and drinking plenty of  "fluids will help to avoid constipation.  · Urinary frequency after childbirth is normal.    POSTPARTUM BLUES  During the first few days after birth, you may experience a sense of the \"blues\" which may include impatience, irritability or even crying.  These feeling come and go quickly.  However, as many as 1 in 10 women experience emotional symptoms known as postpartum depression.    Postpartum depression:  May start as early as the second or third day after delivery or take several weeks or months to develop.  Symptoms of \"blues\" are present, but are more intense:  Crying spells; loss of appetite; feelings of hopelessness or loss of control; fear of touching the baby; over concern or no concern at all about the baby; little or no concern about your own appearance/caring for yourself; and/or inability to sleep or excessive sleeping.  Contact your physician if you are experiencing any of these symptoms.    Crisis Hotline:  · [place] Crisis Hotline:  3-157-EEZSTAR  Or 1-735.133.4840  · [place] Crisis Hotline:  1-753.676.5148  Or 640-713-4665    PREVENTING SHAKEN BABY:  If you are angry or stressed, PUT THE BABY IN THE CRIB, step away, take some deep breaths, and wait until you are calm to care for the baby.  DO NOT SHAKE THE BABY.  You are not alone, call a supporter for help.    · Crisis Call Center 24/7 crisis line 604-687-9814 or 1-453.491.3657  · You can also text them, text \"ANSWER\" to 367354    QUIT SMOKING/TOBACCO USE:  I understand the use of any tobacco products increases my chance of suffering from future heart disease and could cause other illnesses which may shorten my life. Quitting the use of tobacco products is the single most important thing I can do to improve my health. For further information on smoking / tobacco cessation call a Toll Free Quit Line at 1-271.872.1190 (*National [employer] [place]) or 1-361.361.5114 (American Lung Association) or you can access the web based program at " www.lungusa.org.    · [place] Tobacco Users Help Line:  (196) 426-7102       Toll Free: 1-952.388.6083  · Quit Tobacco Program [facility] (380)832-5875    DEPRESSION / SUICIDE RISK:  As you are discharged from this [facility], it is important to learn how to keep safe from harming yourself.    Recognize the warning signs:  · Abrupt changes in personality, positive or negative- including increase in energy   · Giving away possessions  · Change in eating patterns- significant weight changes-  positive or negative  · Change in sleeping patterns- unable to sleep or sleeping all the time   · Unwillingness or inability to communicate  · Depression  · Unusual sadness, discouragement and loneliness  · Talk of wanting to die  · Neglect of personal appearance   · Rebelliousness- reckless behavior  · Withdrawal from people/activities they love  · Confusion- inability to concentrate     If you or a loved one observes any of these behaviors or has concerns about self-harm, here's what you can do:  · Talk about it- your feelings and reasons for harming yourself  · Remove any means that you might use to hurt yourself (examples: pills, rope, extension cords, firearm)  · Get professional help from the community (Mental Health, Substance Abuse, psychological counseling)  · Do not be alone:Call your Safe Contact- someone whom you trust who will be there for you.  · Call your local CRISIS HOTLINE 566-4335 or 166-374-9611  · Call your local Children's Mobile Crisis Response Team [place] (968) 442-2372 or www.[employer]  · Call the toll free National Suicide Prevention Hotlines   · National Suicide Prevention Lifeline 373-283-OLDS (5950)  · National Hope Line Network 800-SUICIDE (008-5900)    DISCHARGE SURVEY:  Thank you for choosing [facility].  We hope we provided you with very good care.  You may be receiving a survey in the mail.  Please fill it out.  Your opinion is valuable to  us.    ADDITIONAL EDUCATIONAL MATERIALS GIVEN TO PATIENT:        My signature on this form indicates that:  1.  I have reviewed and understand the above information  2.  My questions regarding this information have been answered to my satisfaction.  3.  I have formulated a plan with my discharge nurse to obtain my prescribed medication for home.

## 2017-08-24 NOTE — DISCHARGE SUMMARY
Discharge Summary:      Amada Meneses      Admit Date:   2017  Discharge Date:  2017     Admitting diagnosis:  Pregnancy  Labor and delivery, indication for care  Discharge Diagnosis: Status post vaginal, spontaneous.  Pregnancy Complications: none but pt had PP hemorrhage   Tubal Ligation:  no        History:  Past Medical History   Diagnosis Date   • Asthma 2016     albuterol inhaler 2 times a day   • Migraine      no medications     OB History    Para Term  AB SAB TAB Ectopic Multiple Living   5 3 3  1 1    2      # Outcome Date GA Lbr Shaun/2nd Weight Sex Delivery Anes PTL Lv   5 Current            4 Term 10/29/11 40w0d  2.268 kg (5 lb) F Vag-Spont None N    3 SAB 2010 12w0d             Comments: D&C   2 Term 01 40w0d  3.175 kg (7 lb) M Vag-Spont None N Y   1 Term 96 40w0d  4.082 kg (9 lb) F Vag-Spont EPI N Y           Review of patient's allergies indicates no known allergies.  Patient Active Problem List    Diagnosis Date Noted   • GBS (group B Streptococcus carrier), +RV culture, currently pregnant 2017     Priority: High   • Elderly multigravida in third trimester 03/15/2017     Priority: High   • Labor and delivery, indication for care 2017   • History of macrosomia in infant in prior pregnancy, currently pregnant in second trimester 03/15/2017   • Incontinence 03/15/2017   • Migraine without aura 03/15/2017        Hospital Course:   42 y.o. , now para 3, was admitted with the above mentioned diagnosis, underwent Active Labor, vaginal, spontaneous. Patient postpartum course was unremarkable, with progressive advancement in diet , ambulation and toleration of oral analgesia. Patient without complaints today and desires discharge.      Filed Vitals:    17 0200 17 0400 17 0800 17   BP:  112/60 117/64 105/70   Pulse: 98 79 93 70   Temp:  36.2 °C (97.2 °F) 36.3 °C (97.3 °F) 37 °C (98.6 °F)   TempSrc:        Resp: 18 19 20 18   Height:       Weight:       SpO2: 98% 97% 97% 97%       Current Facility-Administered Medications   Medication Dose   • ibuprofen (MOTRIN) tablet 600 mg  600 mg   • acetaminophen (TYLENOL) tablet 325 mg  325 mg   • oxycodone-acetaminophen (PERCOCET) 5-325 MG per tablet 1 Tab  1 Tab   • oxycodone immediate release (ROXICODONE) tablet 10 mg  10 mg   • LR infusion     • PRN oxytocin (PITOCIN) (20 Units/1000 mL) PRN for excessive uterine bleeding - See Admin Instr  125-999 mL/hr   • misoprostol (CYTOTEC) tablet 800 mcg  800 mcg   • docusate sodium (COLACE) capsule 100 mg  100 mg   • bisacodyl (DULCOLAX) suppository 10 mg  10 mg   • prenatal plus vitamin (STUARTNATAL 1+1) 27-1 MG tablet 1 Tab  1 Tab   • ondansetron (ZOFRAN ODT) dispertab 4 mg  4 mg    Or   • ondansetron (ZOFRAN) syringe/vial injection 4 mg  4 mg   • oxytocin (PITOCIN) infusion (for postpartum)   mL/hr       Exam:  Breast Exam: negative  Abdomen: Abdomen soft, non-tender. BS normal. No masses,  No organomegaly  Fundus Non Tender: yes  Incision: none  Perineum: perineum intact  Extremity: extremities, peripheral pulses and reflexes normal     Labs:  Recent Labs      08/22/17   0845  08/22/17   1643  08/24/17   0413   WBC  15.6*  13.5*  12.7*   RBC  2.22*  2.22*  2.12*   HEMOGLOBIN  7.3*  7.4*  7.0*   HEMATOCRIT  21.5*  21.6*  21.0*   MCV  96.8  97.3  99.1*   MCH  32.9  33.3*  33.0   MCHC  34.0  34.3  33.3*   RDW  44.0  43.8  46.3   PLATELETCT  191  188  229   MPV  10.5  9.9  9.9        Activity:   Discharge to home  Pelvic Rest x 6 weeks    Assessment:  normal postpartum course  Discharge Assessment: No areas of skin breakdown/redness; surgical incision intact/healing     Follow [facility]: [facility] or [facility] [place] [facility] in 5 weeks for vaginal. Pt has asymptomatic anemia after PPH, so will send home on PO FeSO4. Call if worsening.       Discharge Meds:   Current Outpatient Prescriptions   Medication Sig Dispense Refill   • ibuprofen  (MOTRIN) 600 MG Tab Take 1 Tab by mouth every 6 hours as needed (For cramping after delivery; do not give if patient is receiving ketorolac (Toradol)). 30 Tab 0       JULIA LawA.

## 2017-08-24 NOTE — CARE PLAN
Problem: Pain Management  Goal: Pain level will decrease to patient’s comfort goal  Intervention: Follow pain managment plan developed in collaboration with patient and Interdisciplinary Team  To keep comfortable at rest.      Problem: Altered physiologic condition related to immediate post-delivery state and potential for bleeding/hemorrhage  Goal: Patient physiologically stable as evidenced by normal lochia, palpable uterine involution and vital signs within normal limits  Intervention: Massage fundus as necessary to prevent excessive lochia  Lochia light. Fundus firm.

## 2017-08-24 NOTE — PROGRESS NOTES
Bedside report received. Assumed pt. Care. Pt resting in bed. Assessment done and WNL.  No s/s of distress noted. Fundus firm. Lochia light. AAO x4. Pt to be medicated for pain upon request. Call light and belongings within reach. Bed in the lowest position. Treaded socks in place. Will continue to monitor .

## 2017-09-26 ENCOUNTER — POST PARTUM (OUTPATIENT)
Dept: OBGYN | Facility: CLINIC | Age: 42
End: 2017-09-26

## 2017-09-26 VITALS — SYSTOLIC BLOOD PRESSURE: 134 MMHG | WEIGHT: 146 LBS | DIASTOLIC BLOOD PRESSURE: 86 MMHG | BODY MASS INDEX: 26.7 KG/M2

## 2017-09-26 DIAGNOSIS — O09.523 ELDERLY MULTIGRAVIDA IN THIRD TRIMESTER: ICD-10-CM

## 2017-09-26 DIAGNOSIS — O99.820 GBS (GROUP B STREPTOCOCCUS CARRIER), +RV CULTURE, CURRENTLY PREGNANT: ICD-10-CM

## 2017-09-26 PROBLEM — O09.292 HISTORY OF MACROSOMIA IN INFANT IN PRIOR PREGNANCY, CURRENTLY PREGNANT IN SECOND TRIMESTER: Status: RESOLVED | Noted: 2017-03-15 | Resolved: 2017-09-26

## 2017-09-26 PROCEDURE — 90050 PR POSTPARTUM VISIT: CPT | Performed by: NURSE PRACTITIONER

## 2017-09-26 ASSESSMENT — ENCOUNTER SYMPTOMS: HEADACHES: 1

## 2017-09-26 NOTE — PROGRESS NOTES
Pt here today for postpartum exam.  Delivery Date 8/22/17  Currently: breast and formula feeding   BCM: pt unsure of birthcontrol , [facility].   Good ph [phone]  Pt states has a headache and her feet are hurting.

## 2017-09-26 NOTE — PATIENT INSTRUCTIONS
Plan   Plan:    1. Breastfeeding support   2. Continue PNV   3. Contraceptive counseling - follow up w health dept or [facility] for depo-provera  4. Encouraged condom use   5. Discussed diet, exercise and resumption of sexual activity   6. Gave copy of pap , f/u 1yr  7.  F/u c PCP or [facility] clinic as needed for primary care needs.   8.  Mammogram this year.   9.  D/w pt PP depression, no current tobacco use, encouraged flu shot.

## 2017-09-26 NOTE — PROGRESS NOTES
Subjective:    Amada Meneses is a 42 y.o.  female who presents for her postpartum exam. She had  complicated by elevated BPs. Her prenatal course was uncomplicated. She denies dysuria, vaginal bleeding, odor, itching or breast problems. She is breast and bottlefeeding. She desires depo-provera for her birth control method. Reports no sex prior to this appointment.  Denies any S/S of PP depression.          HPI  Review of Systems   Neurological: Positive for headaches.          Objective:     /90   Wt 66.2 kg (146 lb)   LMP 2016 (Exact Date)   Breastfeeding? Unknown   BMI 26.70 kg/m²    BP: 138/84    Physical Exam   Constitutional: She is oriented to person, place, and time. She appears well-developed and well-nourished.   HENT:   Head: Normocephalic and atraumatic.   Neck: Normal range of motion. Neck supple. No thyromegaly present.   Cardiovascular: Normal rate, regular rhythm, normal heart sounds and intact distal pulses.    Pulmonary/Chest: Effort normal and breath sounds normal. Right breast exhibits no inverted nipple, no mass, no nipple discharge, no skin change and no tenderness. Left breast exhibits no inverted nipple, no mass, no nipple discharge, no skin change and no tenderness. Breasts are symmetrical. There is no breast swelling.   Lactating   Abdominal: Soft. Normal appearance and bowel sounds are normal. There is no CVA tenderness.   Genitourinary: Vagina normal and uterus normal. No breast tenderness, discharge or bleeding. Pelvic exam was performed with patient supine. No labial fusion. There is no rash, tenderness, lesion or injury on the right labia. There is no rash, tenderness, lesion or injury on the left labia. Cervix exhibits no motion tenderness, no discharge and no friability. Right adnexum displays no mass, no tenderness and no fullness. Left adnexum displays no mass, no tenderness and no fullness.   Genitourinary Comments: Repair well healed    Musculoskeletal: Normal range of motion.   Neurological: She is alert and oriented to person, place, and time. She has normal reflexes.   Skin: Skin is warm and dry.   Psychiatric: She has a normal mood and affect. Her behavior is normal. Judgment and thought content normal.   Nursing note and vitals reviewed.            Assessment   Assessment:    1. PP care of lactating women   2. Exam WNL   3. Pap WNL  4. Desires contraception     Patient Active Problem List    Diagnosis Date Noted   • Postpartum care following vaginal delivery 09/26/2017   • Incontinence 03/15/2017   • Migraine without aura 03/15/2017       Plan   Plan:    1. Breastfeeding support   2. Continue PNV   3. Contraceptive counseling - follow up w health dept or [facility] for depo-provera  4. Encouraged condom use   5. Discussed diet, exercise and resumption of sexual activity   6. Gave copy of pap , f/u 1yr  7.  F/u c PCP or [facility] clinic as needed for primary care needs.   8.  Mammogram this year.   9.  D/w pt PP depression, no current tobacco use, encouraged flu shot.

## 2018-07-11 ENCOUNTER — HOSPITAL ENCOUNTER (OUTPATIENT)
Dept: RADIOLOGY | Facility: MEDICAL CENTER | Age: 43
End: 2018-07-11
Attending: ALLERGY & IMMUNOLOGY
Payer: MEDICAID

## 2018-07-11 DIAGNOSIS — R05.9 COUGH: ICD-10-CM

## 2018-07-11 PROCEDURE — 71046 X-RAY EXAM CHEST 2 VIEWS: CPT

## 2018-07-11 PROCEDURE — 70360 X-RAY EXAM OF NECK: CPT

## 2019-01-31 ENCOUNTER — HOSPITAL ENCOUNTER (OUTPATIENT)
Dept: PULMONOLOGY | Facility: MEDICAL CENTER | Age: 44
End: 2019-01-31
Attending: ALLERGY & IMMUNOLOGY
Payer: COMMERCIAL

## 2019-01-31 PROCEDURE — 94726 PLETHYSMOGRAPHY LUNG VOLUMES: CPT | Mod: 26 | Performed by: INTERNAL MEDICINE

## 2019-01-31 PROCEDURE — 94729 DIFFUSING CAPACITY: CPT

## 2019-01-31 PROCEDURE — 94060 EVALUATION OF WHEEZING: CPT | Mod: 26 | Performed by: INTERNAL MEDICINE

## 2019-01-31 PROCEDURE — 94729 DIFFUSING CAPACITY: CPT | Mod: 26 | Performed by: INTERNAL MEDICINE

## 2019-01-31 PROCEDURE — 94726 PLETHYSMOGRAPHY LUNG VOLUMES: CPT

## 2019-01-31 PROCEDURE — 94060 EVALUATION OF WHEEZING: CPT

## 2019-01-31 ASSESSMENT — PULMONARY FUNCTION TESTS
FEV1/FVC_PREDICTED: 82
FEV1/FVC_PERCENT_LLN: 68
FEV1/FVC: 87
FVC_LLN: 2.63
FEV1_PREDICTED: 2.59
FEV1/FVC: 85
FVC_PERCENT_PREDICTED: 80
FEV1: 2.13
FEV1_LLN: 2.16
FEV1/FVC_PERCENT_LLN: 68
FEV1/FVC: 87.45
FEV1: 2.16
FEV1/FVC_PERCENT_CHANGE: 3
FVC_PERCENT_PREDICTED: 78
FVC: 2.52
FEV1_PERCENT_CHANGE: -2
FEV1_LLN: 2.16
FEV1_PERCENT_CHANGE: 0
FEV1/FVC_PERCENT_PREDICTED: 103
FEV1/FVC: 85
FEV1/FVC_PERCENT_PREDICTED: 106
FEV1_PERCENT_PREDICTED: 82
FEV1_PERCENT_PREDICTED: 83
FVC_LLN: 2.63
FVC: 2.47
FEV1/FVC_PERCENT_CHANGE: 0
FEV1/FVC_PERCENT_PREDICTED: 82
FEV1/FVC_PERCENT_PREDICTED: 103
FVC_PREDICTED: 3.15
FEV1/FVC_PERCENT_PREDICTED: 106

## 2019-02-07 NOTE — PROCEDURES
PULMONARY FUNCTION TEST INTERPRETATION    DATE OF STUDY:  01/31/2019    REQUESTING PROVIDER:  Bryan Esteban MD    REASON FOR REQUEST:  Cough.    FINDINGS:  1.  Acceptable and reproducible.  2.  FEV1 2.13 liters (32%), FVC 2.52 liters (80%), ratio 85%.  3.  Flow volume loops normal appearing with concavity normal appearing.  4.  Total lung capacity 3.82 liters (85%).  5.  DLCO 22.05 mL/min/mmHg (122%).    IMPRESSION:  Normal spirometry.  No response to bronchodilator.  Relatively   normal lung volumes with normal gas transfer.       ____________________________________     Nic Patel MD    FM / NTS    DD:  02/06/2019 17:10:42  DT:  02/06/2019 19:05:41    D#:  [id number]  Job#:  [id number]    cc: BRYAN ESTEBAN MD

## 2021-03-30 ENCOUNTER — HOSPITAL ENCOUNTER (OUTPATIENT)
Facility: MEDICAL CENTER | Age: 46
End: 2021-03-30
Attending: NURSE PRACTITIONER
Payer: COMMERCIAL

## 2021-03-30 ENCOUNTER — GYNECOLOGY VISIT (OUTPATIENT)
Dept: OBGYN | Facility: CLINIC | Age: 46
End: 2021-03-30
Payer: COMMERCIAL

## 2021-03-30 VITALS
BODY MASS INDEX: 30.91 KG/M2 | WEIGHT: 168 LBS | SYSTOLIC BLOOD PRESSURE: 140 MMHG | HEIGHT: 62 IN | DIASTOLIC BLOOD PRESSURE: 88 MMHG

## 2021-03-30 DIAGNOSIS — R32 URINARY INCONTINENCE, UNSPECIFIED TYPE: ICD-10-CM

## 2021-03-30 DIAGNOSIS — Z01.419 ENCOUNTER FOR WELL WOMAN EXAM WITH ROUTINE GYNECOLOGICAL EXAM: Primary | ICD-10-CM

## 2021-03-30 DIAGNOSIS — Z12.4 CERVICAL CANCER SCREENING: ICD-10-CM

## 2021-03-30 LAB
INT CON NEG: NEGATIVE
INT CON POS: POSITIVE
POC URINE PREGNANCY TEST: NEGATIVE

## 2021-03-30 PROCEDURE — 87624 HPV HI-RISK TYP POOLED RSLT: CPT

## 2021-03-30 PROCEDURE — 87591 N.GONORRHOEAE DNA AMP PROB: CPT

## 2021-03-30 PROCEDURE — 99386 PREV VISIT NEW AGE 40-64: CPT | Performed by: NURSE PRACTITIONER

## 2021-03-30 PROCEDURE — 81025 URINE PREGNANCY TEST: CPT | Performed by: NURSE PRACTITIONER

## 2021-03-30 PROCEDURE — 88175 CYTOPATH C/V AUTO FLUID REDO: CPT

## 2021-03-30 PROCEDURE — 87491 CHLMYD TRACH DNA AMP PROBE: CPT

## 2021-03-30 RX ORDER — LOSARTAN POTASSIUM 50 MG/1
50 TABLET ORAL DAILY
COMMUNITY

## 2021-03-30 RX ORDER — ACETAMINOPHEN AND CODEINE PHOSPHATE 120; 12 MG/5ML; MG/5ML
0.35 SOLUTION ORAL DAILY
Qty: 30 TABLET | Refills: 11 | Status: SHIPPED | OUTPATIENT
Start: 2021-03-30 | End: 2021-04-29

## 2021-03-30 NOTE — PATIENT INSTRUCTIONS
Plan:   Pap and physical exam performed  Monthly SBE.  Counseling: breast self exam, mammography screening and menopause  Encourage exercise and proper diet.  Mammogram ordered.  F/u w MDs to discuss incontinence.  Rx for micronor given to pt.

## 2021-03-30 NOTE — PROGRESS NOTES
Amada Wilson is a 46 y.o. y.o. female who presents for her Gynecologic Exam        HPI Comments: Pt presents for well woman exam. Pt has reports being referred for urinary incontinence.  Says she can become incontinent of urine when she sneezes, laughs or just sometimes for no reason at all.  She has been taking micronor rx'ed by her primary care doctor.  Also notes some occ hot flashes.  Has gained some weight.  Declines any additional STI screening today.  Has not seen her PCP for high blood pressure in awhile but has been taking her BP medication.    Review of Systems   ROS: Patient is feeling well. No dyspnea or chest pain on exertion. No Abdominal pain, change in bowel habits, black or bloody stools. No urinary sx. GYN ROS:normal menses, no abnormal bleeding, pelvic pain or discharge, no breast pain or new or enlarging lumps on self exam. Denies breast tenderness, mass, discharge, changes in size or contour, or abnormal cyclic discomfort. No neurological complaints. Reports some hot flashes at night.    Menstrual History: menses regular   Contraceptive Method: oral progesterone-only contraceptive    All PMH, PSH, allergies, social history and FH reviewed and updated today:  Past Medical History:   Diagnosis Date   • Asthma 12/2016    albuterol inhaler 2 times a day   • Migraine 2014    no medications     Past Surgical History:   Procedure Laterality Date   • APPENDECTOMY       Patient has no known allergies.  Social History     Socioeconomic History   • Marital status: Single     Spouse name: Not on file   • Number of children: Not on file   • Years of education: Not on file   • Highest education level: Not on file   Occupational History   • Not on file   Tobacco Use   • Smoking status: Never Smoker   • Smokeless tobacco: Never Used   Substance and Sexual Activity   • Alcohol use: No   • Drug use: No   • Sexual activity: Yes     Partners: Male     Birth control/protection: Pill     Comment:  "none. Planned pregnancy.    Other Topics Concern   • Not on file   Social History Narrative   • Not on file     Social Determinants of Health     Financial Resource Strain:    • Difficulty of Paying Living Expenses:    Food Insecurity:    • Worried About Running Out of Food in the Last Year:    • Ran Out of Food in the Last Year:    Transportation Needs:    • Lack of Transportation (Medical):    • Lack of Transportation (Non-Medical):    Physical Activity:    • Days of Exercise per Week:    • Minutes of Exercise per Session:    Stress:    • Feeling of Stress :    Social Connections:    • Frequency of Communication with Friends and Family:    • Frequency of Social Gatherings with Friends and Family:    • Attends Buddhist Services:    • Active Member of Clubs or Organizations:    • Attends Club or Organization Meetings:    • Marital Status:    Intimate Partner Violence:    • Fear of Current or Ex-Partner:    • Emotionally Abused:    • Physically Abused:    • Sexually Abused:      Family History   Problem Relation Age of Onset   • Hypertension Mother      Medications:   Current Outpatient Medications Ordered in Epic   Medication Sig Dispense Refill   • losartan (COZAAR) 50 MG Tab Take 50 mg by mouth every day.     • Prenatal Multivit-Min-Fe-FA (PRENATAL VITAMINS PO) Take  by mouth.     • albuterol (PROVENTIL) 2.5 mg/0.5 mL Nebu Soln by Nebulization route ONCE (RT).       No current Epic-ordered facility-administered medications on file.          Objective:   Vital measurements:  /88 (BP Location: Right arm, Patient Position: Sitting)   Ht 1.562 m (5' 1.5\")   Wt 76.2 kg (168 lb)   Body mass index is 31.23 kg/m². (Goal BM I>18 <25)  UPT: neg    Physical Exam   Nursing note and vitals reviewed.  Constitutional: She is oriented to person, place, and time. She appears well-developed and well-nourished. No distress.     HEENT:   Head: Normocephalic and atraumatic.   Right Ear: External ear normal.   Left Ear: " External ear normal.   Nose: Nose normal.   Eyes: Conjunctivae and EOM are normal. Pupils are equal, round, and reactive to light. No scleral icterus.     Neck: Normal range of motion. Neck supple. No tracheal deviation present. No thyromegaly present.     Pulmonary/Chest: Effort normal and breath sounds normal. No respiratory distress. She has no wheezes. She has no rales. She exhibits no tenderness.     Cardiovascular: Regular, rate and rhythm. No JVD.    Abdominal: Soft. Bowel sounds are normal. She exhibits no distension and no mass. No tenderness. She has no rebound and no guarding.     Breast:  Symmetrical, normal consistency without masses.    Genitourinary:  Pelvic exam was performed with patient supine.  External genitalia, urethral meatus, urethra, bladder and vagina normal. Anus and perineum normal. Bimanual without masses or tenderness.  Vagina is moist with no lesions, foul discharge, erythema, tenderness or bleeding. No foreign body around the vagina or signs of injury.   Cervix exhibits no motion tenderness, no discharge and no friability.   Uterus is not deviated, not enlarged, not fixed and not tender.  Right adnexum displays no mass, no tenderness and no fullness. Left adnexum displays no mass, no tenderness and no fullness.     Musculoskeletal: Normal range of motion. She exhibits no edema and no tenderness.     Lymphadenopathy: She has no cervical adenopathy.     Neurological: She is alert and oriented to person, place, and time. She exhibits normal muscle tone.     Skin: Skin is warm and dry. No rash noted. She is not diaphoretic. No erythema. No pallor.     Psychiatric: She has a normal mood and affect. Her behavior is normal. Judgment and thought content normal.               Assessment:     1. Encounter for well woman exam with routine gynecological exam  THINPREP PAP W/HPV AND CTNG    MA-SCREENING MAMMO BILAT W/CAD   2. Cervical cancer screening  THINPREP PAP W/HPV AND CTNG   3. Urinary  incontinence, unspecified type           Plan:   Pap and physical exam performed  Monthly SBE.  Counseling: breast self exam, mammography screening and menopause  Encourage exercise and proper diet.  Mammogram ordered.  F/u w MDs to discuss incontinence.  Rx for micronor given to pt.

## 2021-03-30 NOTE — NON-PROVIDER
Patient here for annual exam  Last pap done/result:3/2017=negative  LMP:3/2/21  BCM:pills  Last mammogram, if applicable: never had one  Phone number: [phone]  Pharmacy verified

## 2021-03-31 DIAGNOSIS — Z01.419 ENCOUNTER FOR WELL WOMAN EXAM WITH ROUTINE GYNECOLOGICAL EXAM: ICD-10-CM

## 2021-03-31 DIAGNOSIS — Z12.4 CERVICAL CANCER SCREENING: ICD-10-CM

## 2021-04-01 LAB
C TRACH DNA GENITAL QL NAA+PROBE: NEGATIVE
CYTOLOGY REG CYTOL: ABNORMAL
HPV HR 12 DNA CVX QL NAA+PROBE: NEGATIVE
HPV16 DNA SPEC QL NAA+PROBE: POSITIVE
HPV18 DNA SPEC QL NAA+PROBE: NEGATIVE
N GONORRHOEA DNA GENITAL QL NAA+PROBE: NEGATIVE
SPECIMEN SOURCE: ABNORMAL
SPECIMEN SOURCE: ABNORMAL

## 2021-04-02 DIAGNOSIS — B97.7 HPV (HUMAN PAPILLOMA VIRUS) INFECTION: ICD-10-CM

## 2021-04-05 ENCOUNTER — TELEPHONE (OUTPATIENT)
Dept: OBGYN | Facility: CLINIC | Age: 46
End: 2021-04-05

## 2021-04-05 PROBLEM — B97.7 HIGH RISK HPV INFECTION: Status: ACTIVE | Noted: 2021-04-05

## 2021-04-05 NOTE — TELEPHONE ENCOUNTER
----- Message from STEFF Lopez sent at 4/2/2021 10:03 AM PDT -----  Pt needs colpo. Referral placed call to schedule.   4/5/21. Patient was notified. Procedure explained to patient. Patient's information given to Mel DOMINGUEZ to call and schedule patient. Patient agrees and has no me questions.

## 2021-04-20 ENCOUNTER — GYNECOLOGY VISIT (OUTPATIENT)
Dept: OBGYN | Facility: CLINIC | Age: 46
End: 2021-04-20
Payer: COMMERCIAL

## 2021-04-20 VITALS — WEIGHT: 168 LBS | BODY MASS INDEX: 31.23 KG/M2 | DIASTOLIC BLOOD PRESSURE: 86 MMHG | SYSTOLIC BLOOD PRESSURE: 138 MMHG

## 2021-04-20 DIAGNOSIS — N39.3 SUI (STRESS URINARY INCONTINENCE, FEMALE): ICD-10-CM

## 2021-04-20 DIAGNOSIS — R32 INCONTINENCE IN FEMALE: ICD-10-CM

## 2021-04-20 PROCEDURE — 99204 OFFICE O/P NEW MOD 45 MIN: CPT | Performed by: OBSTETRICS & GYNECOLOGY

## 2021-04-20 PROCEDURE — A4561 PESSARY RUBBER, ANY TYPE: HCPCS | Performed by: OBSTETRICS & GYNECOLOGY

## 2021-04-20 NOTE — NON-PROVIDER
Pt here today for GYN appt for incontinence.  C/o leaking urine when coughing, sneezing, exercising, etc.  Phone # [phone]  Pharmacy verified.

## 2021-04-20 NOTE — PROGRESS NOTES
C/c: urinary incontinence     History of present illness: 46 y.o. presents with leaking of urine with coughing, sneezing, and exercise. This has been occurring on and off for the last 7 years. 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   The urine leaks a lot when she sneezes but just a little bit with other activities of valsalva. She needs to wear a thin pad daily.     Review of systems:  Pertinent positives documented in HPI and all other systems reviewed & are negative    Constitutional: negative  Respiratory: negative  Cardiovascular: negative  Gastrointestinal: negative  Genitourinary:positive for urinary incontinence  Integument/breast: negative  Hematologic/lymphatic: negative  Musculoskeletal:negative  Neurological: negative  Behavioral/Psych: negative  Endocrine: negative  Allergic/Immunologic: negative    POBHx:   OB History    Para Term  AB Living   5 4 4   1 4   SAB TAB Ectopic Molar Multiple Live Births   1         4      # Outcome Date GA Lbr Shaun/2nd Weight Sex Delivery Anes PTL Lv   5 Term 17 40w3d  3.805 kg (8 lb 6.2 oz) F Vag-Spont  N ANA   4 Term 10/29/11 40w0d  2.268 kg (5 lb) F Vag-Spont None N ANA   3 SAB 2010 12w0d             Birth Comments: D&C   2 Term 01 40w0d  3.175 kg (7 lb) M Vag-Spont None N ANA   1 Term 96 40w0d  4.082 kg (9 lb) F Vag-Spont EPI N ANA       All PMH, PSH, allergies, social history and FH reviewed and updated today:  Past Medical History:   Diagnosis Date   • Asthma 2016    albuterol inhaler 2 times a day       Past Surgical History:   Procedure Laterality Date   • APPENDECTOMY         Allergies: No Known Allergies    Social History     Socioeconomic History   • Marital status: Single     Spouse name: Not on file   • Number of children: Not on file   • Years of education: Not on file   • Highest education level: Not on file   Occupational History   • Not on file   Tobacco Use   • Smoking status: Never Smoker   • Smokeless tobacco:  Never Used   Substance and Sexual Activity   • Alcohol use: No   • Drug use: No   • Sexual activity: Yes     Partners: Male     Birth control/protection: Pill     Comment: none. Planned pregnancy.    Other Topics Concern   • Not on file   Social History Narrative   • Not on file     Social Determinants of Health     Financial Resource Strain:    • Difficulty of Paying Living Expenses:    Food Insecurity:    • Worried About Running Out of Food in the Last Year:    • Ran Out of Food in the Last Year:    Transportation Needs:    • Lack of Transportation (Medical):    • Lack of Transportation (Non-Medical):    Physical Activity:    • Days of Exercise per Week:    • Minutes of Exercise per Session:    Stress:    • Feeling of Stress :    Social Connections:    • Frequency of Communication with Friends and Family:    • Frequency of Social Gatherings with Friends and Family:    • Attends Spiritism Services:    • Active Member of Clubs or Organizations:    • Attends Club or Organization Meetings:    • Marital Status:    Intimate Partner Violence:    • Fear of Current or Ex-Partner:    • Emotionally Abused:    • Physically Abused:    • Sexually Abused:        Family History   Problem Relation Age of Onset   • Hypertension Mother        Physical exam:  /86 (BP Location: Right arm, Patient Position: Sitting)   Wt 76.2 kg (168 lb)     General:appears stated age, is in no apparent distress  Head: normocephalic, non-tender  Neck: neck is supple, no jugular venous distension, no thyromegaly  CV : regular rate and rhythm, no peripheral edema  Lungs: Normal respiratory effort. Clear to auscultation bilaterally  Abdomen: Bowel sounds positive, nondistended, soft, nontender x4, no rebound or guarding. No organomegaly. No masses.  Female GYN:   POP-Q    Aa: -2  Ba: -1 C: -9   Gh: 6 PB: 2 TVL: 11   Ap: -2 Bp: 0 D: NA     Stage: 1 leading edge ant/post.     Normal PB (3.1-4.1cm)  -  Normal external genitalia, vaginal epithelium,  perineal body, and cervix  - Dovetail sign postive  - Cough test: positive  - Kegel testing: weak  - Fecal or flatal incontinence no      Skin: No rashes, or ulcers or lesions seen  Psychiatric: Patient shows appropriate affect, is alert and oriented x3, intact judgment and insight.      Assessment  1. Incontinence in female  REFERRAL TO PHYSICAL THERAPY   2. CHRISTAL (stress urinary incontinence, female)         Plan  - 46 y.o.  here with stage I prolapse and stress urinary incontinence.   - we discussed etiology of CHRISTAL as a physical/anatomical issue and therefor there are no medications that generally help.   Various treatments discussed: pelvic floor physical therapy, Impressa temporary device, pessary fitting, and TOT sling.   I advised she at least try the pessary as the device can mimic the effects of surgery and many women are very happy with it and can use it in the long term. Patient would like to avoid surgery if possible.   We discussed she may remove it or leave it in place for intercourse. She may elect to clean it daily or if she desires, she may come q month to q 3months to have office clean it.   - patient was fitted for a #4 pessary with support. She was asked to bend over, walk, and urinate all of which she performed without issues.       - Follow up 1 month for eval of pessary      Patient was seen for 52 minutes of which > 50% of appointment time was spent on face-to-face counseling and coordination of care regarding the above.

## 2021-05-04 ENCOUNTER — HOSPITAL ENCOUNTER (OUTPATIENT)
Facility: MEDICAL CENTER | Age: 46
End: 2021-05-04
Attending: OBSTETRICS & GYNECOLOGY
Payer: COMMERCIAL

## 2021-05-04 ENCOUNTER — GYNECOLOGY VISIT (OUTPATIENT)
Dept: OBGYN | Facility: CLINIC | Age: 46
End: 2021-05-04
Payer: COMMERCIAL

## 2021-05-04 VITALS — BODY MASS INDEX: 31.6 KG/M2 | SYSTOLIC BLOOD PRESSURE: 124 MMHG | WEIGHT: 170 LBS | DIASTOLIC BLOOD PRESSURE: 72 MMHG

## 2021-05-04 DIAGNOSIS — Z96.0 VAGINAL PESSARY IN SITU: ICD-10-CM

## 2021-05-04 DIAGNOSIS — N39.46 MIXED STRESS AND URGE URINARY INCONTINENCE: ICD-10-CM

## 2021-05-04 DIAGNOSIS — B97.7 HIGH RISK HPV INFECTION: ICD-10-CM

## 2021-05-04 DIAGNOSIS — B97.7 HPV (HUMAN PAPILLOMA VIRUS) INFECTION: ICD-10-CM

## 2021-05-04 LAB
INT CON NEG: NORMAL
INT CON POS: NORMAL
PATHOLOGY CONSULT NOTE: NORMAL
POC URINE PREGNANCY TEST: NEGATIVE

## 2021-05-04 PROCEDURE — 57454 BX/CURETT OF CERVIX W/SCOPE: CPT | Performed by: OBSTETRICS & GYNECOLOGY

## 2021-05-04 PROCEDURE — 81025 URINE PREGNANCY TEST: CPT | Performed by: OBSTETRICS & GYNECOLOGY

## 2021-05-04 PROCEDURE — 99212 OFFICE O/P EST SF 10 MIN: CPT | Mod: 25 | Performed by: OBSTETRICS & GYNECOLOGY

## 2021-05-04 PROCEDURE — 88305 TISSUE EXAM BY PATHOLOGIST: CPT

## 2021-05-04 NOTE — PROGRESS NOTES
Subjective:      Amada Wilson is a 46 y.o. female who presents with Procedure (Colposcopy)            HPI Patient is a 46-year-old who presents today for due to high risk HPV on Pap smear.  Patient currently has a pessary in place and reports history of mixed urinary incontinence.  She states she does not notice any improvement in her symptoms since pessary placement and she still has a lot of mixed incontinence symptoms.  She is unable to remove her pessary at home and has intercourse with pessary in place and states she complains of some discomfort with intercourse.  She states she had some tenderness a few times with bowel movements but not every.  She denies any vaginal bleeding or discharge.  Fevers or dysuria    ROS are reviewed and are negative except for come into the HPI       Objective:     /72 (BP Location: Right arm, Patient Position: Sitting, BP Cuff Size: Adult)   Wt 77.1 kg (170 lb)   BMI 31.60 kg/m²      Physical Exam  Vitals and nursing note reviewed. Exam conducted with a chaperone present.   Constitutional:       Appearance: Normal appearance. She is obese.   Genitourinary:     General: Normal vulva.      Vagina: No vaginal discharge.   Musculoskeletal:         General: Normal range of motion.      Right lower leg: No edema.      Left lower leg: No edema.   Skin:     General: Skin is warm and dry.      Coloration: Skin is not jaundiced.      Findings: No bruising.   Neurological:      General: No focal deficit present.      Mental Status: She is alert and oriented to person, place, and time.      Gait: Gait normal.   Psychiatric:         Mood and Affect: Mood normal.         Behavior: Behavior normal.         Thought Content: Thought content normal.         Judgment: Judgment normal.          Discussion:  I discussed mixed urinary incontinence symptoms and I discussed treatment options.  We discussed that the stress incontinence  can be managed with pelvic floor therapy  or bladder sling and that urgency incontinence symptoms may improve with medication.  After discussion patient desires to try medication for her urinary symptoms and patient desires to try mirabegron.  I counseled patient on this medication including risk benefits potential side effects and complication and she agrees to usage.  She will follow-up in 3 weeks for reevaluation.  We discussed that we are starting at a lower dose and we can increase dosage at next visit to see if she has better symptom control and patient agrees to this.  Pessary was removed today cleaned and refitted.  No ulceration abnormal bleeding or discharge noted.  Patient has appoint for pessary evaluation on the 17th of this month.     Assessment/Plan:        1. HPV (human papilloma virus) infection  Patient counseled on Pap smear findings.  Patient was counseled on HPV and HPV risks.  Colposcopy with cervical biopsy and ECC obtained today  - Consent for all Surgical, Special Diagnostic or Therapeutic Procedures  - POCT Pregnancy    2. Vaginal pessary in situ  Patient will continue pessary use and she has follow-up appointment for pessary evaluation on the 17th    3. High risk HPV infection  Patient counseled on HPV and HPV risks    4. Mixed stress and urge urinary incontinence  Patient reports that her urinary symptoms have not improved with pessary.  She desires to try medication and we discussed trial of different medication and she desires to try mirabegron once daily.  Patient counseled extensively on this medication using .  Patient to follow-up in 3 to 4 weeks for reevaluation  - Mirabegron ER 25 MG TABLET SR 24 HR; Take 1 tablet by mouth every day at 6 PM.  Dispense: 30 tablet; Refill: 1

## 2021-05-04 NOTE — LETTER
COLPOSCOPY / LEEP DATA SHEET    Amada Wilson     1975      46 y.o.      No LMP recorded.     @EDC@       Medical history:   o MVP    o Blood dyscrasia    o Rh     o Other: .    STD history:    o HPV     o HSV     o HIV     o GC     o Chlamydia     o None     o Other: .    HIV:   o Positive     o Negative                            IV Drug User:   o  Yes    o  No .    Age of first coitus:                                                Number of sex partners in lifetime:  .    Reason for exam:.    Prior abnormal PAPS?    o  Yes  o  No        Treatment: .    Prior history of condyloma?    o  Yes  o  No        Treatment:.     Colposcopic view - description:                                 Satisfactory?    o  Yes  o  No                    Squamo-columnar junction seen?  o  Yes  o  No.    Entire lesion seen?     o  Yes  o  No          PAP done?  o  Yes  o  No           Biopsies done?  o  Yes  o  No.    Biopsy sites:.    ECC done?    o  Yes  o  No      If no, reason:.    Impression:.    Recommendations:.             Colposcopist: ______________________________________________ Date: ___________________

## 2021-05-04 NOTE — PROCEDURES
Amada Octavio Wilson  46 y.o.  Female  here today for colposcopy to evaluate her for abnormal pap:     Colposcopy    Indication:  Normal pap with HR HPV 16    Prior to beginning the procedure, risk and benefits of a colposcopy with possibility of biopsies were discussed including the risk of infection, bleeding, and pain. Patient understands the risks associated with the procedure, questions have been answered and consents have been signed to the chart.    Procedure:    Speculum is placed and cervix is visualized. The cervix is cleansed with dilute acetic acid solution.     Satisfactory: YES   Squamo-columnar junction seen: YES  Entire lesion seen: YES-acetowhite area noted at 12:00 which bleeds easily on touch  Biopsies done: YES  Biopsy site: 12:00  ECC: YES    Impression:  46-year-old with normal Pap smear with positive high risk HPV testing types 16  Patient counseled on findings and colposcopy was performed-acetowhite area noted at 12:00 on the cervix which was biopsied and ECC was also obtained        Recommendations:  Follow-up will be based on pathology  Patient will likely need annual Pap smears pathology to advise patient  Safe sex practices discussed  I discussed with patient HPV and HPV risks/ association with cervical cancer      See colpo sheet for further info.

## 2021-05-07 ENCOUNTER — TELEPHONE (OUTPATIENT)
Dept: OBGYN | Facility: CLINIC | Age: 46
End: 2021-05-07

## 2021-05-07 NOTE — TELEPHONE ENCOUNTER
----- Message from Yariel Panda M.D. sent at 5/6/2021  2:03 PM PDT -----  Pathology was benign.  Patient can repeat Pap smear in 1 year to follow-up on HPV      Called pt and notified as above. Pt agreed and verbalized understanding.

## 2021-05-17 ENCOUNTER — GYNECOLOGY VISIT (OUTPATIENT)
Dept: OBGYN | Facility: CLINIC | Age: 46
End: 2021-05-17
Payer: COMMERCIAL

## 2021-05-17 VITALS — WEIGHT: 171 LBS | DIASTOLIC BLOOD PRESSURE: 89 MMHG | BODY MASS INDEX: 31.79 KG/M2 | SYSTOLIC BLOOD PRESSURE: 125 MMHG

## 2021-05-17 DIAGNOSIS — N39.3 SUI (STRESS URINARY INCONTINENCE, FEMALE): ICD-10-CM

## 2021-05-17 PROCEDURE — A4561 PESSARY RUBBER, ANY TYPE: HCPCS | Performed by: OBSTETRICS & GYNECOLOGY

## 2021-05-17 PROCEDURE — 99213 OFFICE O/P EST LOW 20 MIN: CPT | Performed by: OBSTETRICS & GYNECOLOGY

## 2021-05-17 RX ORDER — TOLTERODINE 2 MG/1
2 CAPSULE, EXTENDED RELEASE ORAL DAILY
Qty: 30 CAPSULE | Refills: 11 | Status: SHIPPED | OUTPATIENT
Start: 2021-05-17 | End: 2023-06-12

## 2021-05-17 NOTE — NON-PROVIDER
Pt here  For incontinence stress urinary   Pt states still leaks urine when coughing and sneezing even with pessary   Good#[phone]   Pharmacy verified

## 2021-05-17 NOTE — PROGRESS NOTES
Chief Complaint   Patient presents with   • Gynecologic Exam       History of present illness: 46 y.o. presents with urinary incontinence with cough and sneezing. She was Rx mirebegron last visit but did not pick it up as insurance did not cover. She has a #4 pessary with membrane right now and states the leaking continues. She states this is the part that bothers her the most.  She states she only feels a strong urge to urinate when she coughs or Valsalva.    Review of systems:  Pertinent positives documented in HPI and all other systems reviewed & are negative    All PMH, PSH, allergies, social history and FH reviewed and updated today:  Past Medical History:   Diagnosis Date   • Asthma 12/2016    albuterol inhaler 2 times a day       Past Surgical History:   Procedure Laterality Date   • APPENDECTOMY         Allergies: No Known Allergies    Social History     Socioeconomic History   • Marital status: Single     Spouse name: Not on file   • Number of children: Not on file   • Years of education: Not on file   • Highest education level: Not on file   Occupational History   • Not on file   Tobacco Use   • Smoking status: Never Smoker   • Smokeless tobacco: Never Used   Vaping Use   • Vaping Use: Never used   Substance and Sexual Activity   • Alcohol use: No   • Drug use: No   • Sexual activity: Yes     Partners: Male     Birth control/protection: Pill     Comment: none. Planned pregnancy.    Other Topics Concern   • Not on file   Social History Narrative   • Not on file     Social Determinants of Health     Financial Resource Strain:    • Difficulty of Paying Living Expenses:    Food Insecurity:    • Worried About Running Out of Food in the Last Year:    • Ran Out of Food in the Last Year:    Transportation Needs:    • Lack of Transportation (Medical):    • Lack of Transportation (Non-Medical):    Physical Activity:    • Days of Exercise per Week:    • Minutes of Exercise per Session:    Stress:    • Feeling of  Stress :    Social Connections:    • Frequency of Communication with Friends and Family:    • Frequency of Social Gatherings with Friends and Family:    • Attends Jewish Services:    • Active Member of Clubs or Organizations:    • Attends Club or Organization Meetings:    • Marital Status:    Intimate Partner Violence:    • Fear of Current or Ex-Partner:    • Emotionally Abused:    • Physically Abused:    • Sexually Abused:        Family History   Problem Relation Age of Onset   • Hypertension Mother        Physical exam:  /89 (BP Location: Right arm, Patient Position: Sitting)   Wt 77.6 kg (171 lb)     General:appears stated age, is in no apparent distress  Head: normocephalic, non-tender  Neck: neck is supple, no jugular venous distension  Abdomen: Bowel sounds positive, nondistended, soft, nontender x4, no rebound or guarding. No organomegaly. No masses.  Female GYN: normal appearing external genitalia   #4 pessary removed. No erosions to the vaginal vault or cervix.   Skin: No rashes, or ulcers or lesions seen  Psychiatric: Patient shows appropriate affect, is alert and oriented x3, intact judgment and insight.      Assessment  1. CHRISTAL (stress urinary incontinence, female)         Plan  - 46 y.o.  here with mainly CHRISTAL symptoms.   -Discussed with patient that the main part of her discomfort is stress urinary incontinence and this can be improved with pessary that is fitted with a knob.  Discussed how this works physically inside the body.  Patient accepts this alteration of pessary.  She was fitted for a #4 with knob today which she was comfortable.  She was asked to stand up and bend over and the pessary did not slip out.    -She states that she would like to try a different class of medication.  Tolterodine ER sent to the pharmacy for her.  Discussed that this is a generic medication and that it should be covered by insurance.  Advised patient to hold off on taking this medication at this point  as her main complaint is stress urinary incontinence and not urge urinary incontinence.    - Follow up 4 weeks

## 2021-05-18 ENCOUNTER — HOSPITAL ENCOUNTER (OUTPATIENT)
Dept: RADIOLOGY | Facility: MEDICAL CENTER | Age: 46
End: 2021-05-18
Attending: NURSE PRACTITIONER
Payer: COMMERCIAL

## 2021-05-18 DIAGNOSIS — Z01.419 ENCOUNTER FOR WELL WOMAN EXAM WITH ROUTINE GYNECOLOGICAL EXAM: ICD-10-CM

## 2021-05-18 PROCEDURE — 77063 BREAST TOMOSYNTHESIS BI: CPT

## 2021-05-25 ENCOUNTER — HOSPITAL ENCOUNTER (OUTPATIENT)
Dept: RADIOLOGY | Facility: MEDICAL CENTER | Age: 46
End: 2021-05-25
Attending: NURSE PRACTITIONER
Payer: COMMERCIAL

## 2021-05-25 DIAGNOSIS — R92.8 ABNORMAL MAMMOGRAM: ICD-10-CM

## 2021-05-25 PROCEDURE — G0279 TOMOSYNTHESIS, MAMMO: HCPCS

## 2021-05-25 PROCEDURE — 76642 ULTRASOUND BREAST LIMITED: CPT | Mod: LT

## 2021-06-29 ENCOUNTER — GYNECOLOGY VISIT (OUTPATIENT)
Dept: OBGYN | Facility: CLINIC | Age: 46
End: 2021-06-29
Payer: COMMERCIAL

## 2021-06-29 VITALS
SYSTOLIC BLOOD PRESSURE: 128 MMHG | BODY MASS INDEX: 32.66 KG/M2 | WEIGHT: 173 LBS | DIASTOLIC BLOOD PRESSURE: 82 MMHG | HEIGHT: 61 IN

## 2021-06-29 DIAGNOSIS — N39.3 SUI (STRESS URINARY INCONTINENCE, FEMALE): ICD-10-CM

## 2021-06-29 PROCEDURE — 99214 OFFICE O/P EST MOD 30 MIN: CPT | Performed by: OBSTETRICS & GYNECOLOGY

## 2021-06-29 RX ORDER — TOLTERODINE 2 MG/1
2 CAPSULE, EXTENDED RELEASE ORAL DAILY
Qty: 30 CAPSULE | Refills: 3 | Status: SHIPPED | OUTPATIENT
Start: 2021-06-29 | End: 2023-06-12

## 2021-06-29 NOTE — PROGRESS NOTES
GYN follow up for pessary evaluation  Pt states she was switched to #4 pessary with knob at her last appt. States she still continues to have significant amount of urine leakage with coughing and sneezing.   LMP: 06/05/2021  BC: pill  WT: 173 lb  BP: 128/82  Good # [phone]

## 2021-06-29 NOTE — PROGRESS NOTES
" 46 y.o.  female previously seen for : Chief Complaint:  Incontenience , Mixed , Patient given Pessary: knob, and Anticholinergic ( not taking this med ) .   Also with chronic cough : ( with loss ) , thought to be allergic . Work up not diagnositc , bu cough is going on x 5 yrs     Specialty Problems     None      . Patient now here in follow up. Patient states still with significant urine loss with cough, and coughs frequently  . No meds for her cough : work up with Allergy doctor .     Patient's last menstrual period was 2021 (exact date).      Subjective: Abdominal Pain: negative    Vaginal Bleedingnegative  Menstrual Cycle: normal: negative  Dysmenorrhea:negative:   Dyspareunia:positive - with pessary   Urinary Symptoms:  CHRISTAL    Vaginal Discharge:{No          Current Outpatient Medications:   •  tolterodine ER (DETROL-LA) 2 MG CAPSULE SR 24 HR, Take 1 capsule by mouth every day., Disp: 30 capsule, Rfl: 11  •  losartan (COZAAR) 50 MG Tab, Take 50 mg by mouth every day., Disp: , Rfl:   •  Prenatal Multivit-Min-Fe-FA (PRENATAL VITAMINS PO), Take  by mouth., Disp: , Rfl:   •  albuterol (PROVENTIL) 2.5 mg/0.5 mL Nebu Soln, by Nebulization route ONCE (RT)., Disp: , Rfl:   ROS: no change in ROS since visit of : 2021.  :No results found for this or any previous visit (from the past 336 hour(s)).    Vitals:    21 1534   BP: 128/82   BP Location: Left arm   Patient Position: Sitting   Weight: 78.5 kg (173 lb)   Height: 1.54 m (5' 0.63\")     Past Medical History:   Diagnosis Date   • Asthma 2016    albuterol inhaler 2 times a day     PGYN: Pessary   Social History     Socioeconomic History   • Marital status: Single     Spouse name: Not on file   • Number of children: Not on file   • Years of education: Not on file   • Highest education level: Not on file   Occupational History   • Not on file   Tobacco Use   • Smoking status: Never Smoker   • Smokeless tobacco: Never Used   Vaping Use   • Vaping " Use: Never used   Substance and Sexual Activity   • Alcohol use: No   • Drug use: No   • Sexual activity: Yes     Partners: Male     Birth control/protection: Pill     Comment: none. Planned pregnancy.    Other Topics Concern   • Not on file   Social History Narrative   • Not on file     Social Determinants of Health     Financial Resource Strain:    • Difficulty of Paying Living Expenses:    Food Insecurity:    • Worried About Running Out of Food in the Last Year:    • Ran Out of Food in the Last Year:    Transportation Needs:    • Lack of Transportation (Medical):    • Lack of Transportation (Non-Medical):    Physical Activity:    • Days of Exercise per Week:    • Minutes of Exercise per Session:    Stress:    • Feeling of Stress :    Social Connections:    • Frequency of Communication with Friends and Family:    • Frequency of Social Gatherings with Friends and Family:    • Attends Mu-ism Services:    • Active Member of Clubs or Organizations:    • Attends Club or Organization Meetings:    • Marital Status:    Intimate Partner Violence:    • Fear of Current or Ex-Partner:    • Emotionally Abused:    • Physically Abused:    • Sexually Abused:      Family History   Problem Relation Age of Onset   • Hypertension Mother      Past Surgical History:   Procedure Laterality Date   • APPENDECTOMY           Exam:   General : Awake, alert and oriented x 3  Abdominal : soft, non-distended no rebound or guarding  Pelvic :  external genitalia normal, Bartholin's glands, urethra, [employer]'s glands negative, vaginal mucosa normal;  vaginal mucosa , pink , thick , strong , minimal POP,   Pessary removed , cleaned , replaced   Pelvic Support system : cystocele mild, uterine descensus first degree  +cough / Aissatou ,   Ass:     Spent 30 minutes minutes with the patient ; Face to Face, with >50% of this time spent in counseling and coordination of care, surrounding the above mentioned issues as well as: PAtient with possible mixed  CHRISTAL, but cough overrides both pessary support , and will probably cause failure of Sling . Encourage patient to work with PCP, on decreasing cough ,   And use detrol at this time ,     P. Detrol LA 2 mg  Daily   Pessary replaced ,     Follow up : With Dr. Smith

## 2021-07-19 ENCOUNTER — GYNECOLOGY VISIT (OUTPATIENT)
Dept: OBGYN | Facility: CLINIC | Age: 46
End: 2021-07-19
Payer: COMMERCIAL

## 2021-07-19 VITALS — SYSTOLIC BLOOD PRESSURE: 124 MMHG | BODY MASS INDEX: 32.51 KG/M2 | WEIGHT: 170 LBS | DIASTOLIC BLOOD PRESSURE: 80 MMHG

## 2021-07-19 DIAGNOSIS — N81.11 MIDLINE CYSTOCELE: ICD-10-CM

## 2021-07-19 PROCEDURE — A4561 PESSARY RUBBER, ANY TYPE: HCPCS | Performed by: OBSTETRICS & GYNECOLOGY

## 2021-07-19 PROCEDURE — 99213 OFFICE O/P EST LOW 20 MIN: CPT | Performed by: OBSTETRICS & GYNECOLOGY

## 2021-07-19 NOTE — PROGRESS NOTES
C/c: follow up stress urinary incontinence    History of present illness: 46 y.o. presents with follow up. She started detrol 2mg daily on  but does not note a difference in loss of urine. Still wearing pessary.    Review of systems:  Pertinent positives documented in HPI and all other systems reviewed & are negative    POBHx:   OB History    Para Term  AB Living   5 4 4   1 4   SAB TAB Ectopic Molar Multiple Live Births   1         4      # Outcome Date GA Lbr Shaun/2nd Weight Sex Delivery Anes PTL Lv   5 Term 17 40w3d  3.805 kg (8 lb 6.2 oz) F Vag-Spont  N ANA   4 Term 10/29/11 40w0d  2.268 kg (5 lb) F Vag-Spont None N ANA   3 SAB 2010 12w0d             Birth Comments: D&C   2 Term 01 40w0d  3.175 kg (7 lb) M Vag-Spont None N ANA   1 Term 96 40w0d  4.082 kg (9 lb) F Vag-Spont EPI N ANA       All PMH, PSH, allergies, social history and FH reviewed and updated today:  Past Medical History:   Diagnosis Date   • Asthma 2016    albuterol inhaler 2 times a day       Past Surgical History:   Procedure Laterality Date   • APPENDECTOMY         Allergies: No Known Allergies    Social History     Socioeconomic History   • Marital status: Single     Spouse name: Not on file   • Number of children: Not on file   • Years of education: Not on file   • Highest education level: Not on file   Occupational History   • Not on file   Tobacco Use   • Smoking status: Never Smoker   • Smokeless tobacco: Never Used   Vaping Use   • Vaping Use: Never used   Substance and Sexual Activity   • Alcohol use: No   • Drug use: No   • Sexual activity: Yes     Partners: Male     Birth control/protection: Pill     Comment: none. Planned pregnancy.    Other Topics Concern   • Not on file   Social History Narrative   • Not on file     Social Determinants of Health     Financial Resource Strain:    • Difficulty of Paying Living Expenses:    Food Insecurity:    • Worried About Running Out of Food in the Last Year:     • Ran Out of Food in the Last Year:    Transportation Needs:    • Lack of Transportation (Medical):    • Lack of Transportation (Non-Medical):    Physical Activity:    • Days of Exercise per Week:    • Minutes of Exercise per Session:    Stress:    • Feeling of Stress :    Social Connections:    • Frequency of Communication with Friends and Family:    • Frequency of Social Gatherings with Friends and Family:    • Attends Oriental orthodox Services:    • Active Member of Clubs or Organizations:    • Attends Club or Organization Meetings:    • Marital Status:    Intimate Partner Violence:    • Fear of Current or Ex-Partner:    • Emotionally Abused:    • Physically Abused:    • Sexually Abused:        Family History   Problem Relation Age of Onset   • Hypertension Mother        Physical exam:  /80   Wt 77.1 kg (170 lb)     General:appears stated age, is in no apparent distress  Head: normocephalic, non-tender  Neck: neck is supple, no jugular venous distension  Abdomen: Bowel sounds positive, nondistended, soft, nontender x4, no rebound or guarding. No organomegaly. No masses.  Female GYN:   Grade 1 cystocele  No significant uterine prolapse  #4 pessary with knob removed  #5 pessary with knob inserted  Skin: No rashes, or ulcers or lesions seen  Psychiatric: Patient shows appropriate affect, is alert and oriented x3, intact judgment and insight.      Assessment  1. Midline cystocele         Plan  - 46 y.o.  here with pelvic organ prolapse    Patient was asked to stand up, walk around, squat, and void all without difficulties using the new pessary.  She was told to increase the Detrol to 4 mg daily.  We discussed that if she gets good relief with the #5 pessary, this would be a positive sign that future surgical intervention would be beneficial.  If she is unable to get good relief with this #5 pessary, she might need bulking agents and to see a urogynecologist.  - Follow up 4wks.

## 2021-07-19 NOTE — NON-PROVIDER
Pt here to f/u on pessary.    Pt states she is still leaking urine   Good# [phone]  Pharmacy confirmed.

## 2021-07-30 ENCOUNTER — HOSPITAL ENCOUNTER (EMERGENCY)
Facility: MEDICAL CENTER | Age: 46
End: 2021-07-30
Payer: COMMERCIAL

## 2021-07-30 VITALS
OXYGEN SATURATION: 98 % | HEIGHT: 61 IN | WEIGHT: 170 LBS | HEART RATE: 116 BPM | DIASTOLIC BLOOD PRESSURE: 103 MMHG | RESPIRATION RATE: 22 BRPM | TEMPERATURE: 98.9 F | SYSTOLIC BLOOD PRESSURE: 158 MMHG | BODY MASS INDEX: 32.1 KG/M2

## 2021-07-30 PROCEDURE — 302449 STATCHG TRIAGE ONLY (STATISTIC)

## 2021-07-31 NOTE — ED TRIAGE NOTES
"Chief Complaint   Patient presents with   • Nausea     pt states she just started taking azithromycin today when after she took it she started to have a stomach ache and felt like she was going to vomit.   • Allergic Reaction     pt states she has never taken azithromycin before.      Pt walk in for above. Pt was given azithromycin due to not feeling well. Pt speaking full sentences, no signs of distress. Pt denies any hives or itchiness after taking azithromycin. Educated pt on triage process and to notify if there is any change.    /103   Pulse (!) 116   Temp 37.2 °C (98.9 °F) (Temporal)   Resp (!) 22   Ht 1.549 m (5' 1\")   Wt 77.1 kg (170 lb)   SpO2 98%   BMI 32.12 kg/m²     "

## 2023-05-14 ENCOUNTER — APPOINTMENT (OUTPATIENT)
Dept: RADIOLOGY | Facility: MEDICAL CENTER | Age: 48
End: 2023-05-14
Attending: EMERGENCY MEDICINE
Payer: COMMERCIAL

## 2023-05-14 ENCOUNTER — HOSPITAL ENCOUNTER (EMERGENCY)
Facility: MEDICAL CENTER | Age: 48
End: 2023-05-14
Attending: EMERGENCY MEDICINE
Payer: COMMERCIAL

## 2023-05-14 VITALS
SYSTOLIC BLOOD PRESSURE: 142 MMHG | OXYGEN SATURATION: 95 % | HEART RATE: 75 BPM | WEIGHT: 171.08 LBS | HEIGHT: 61 IN | BODY MASS INDEX: 32.3 KG/M2 | TEMPERATURE: 97.5 F | DIASTOLIC BLOOD PRESSURE: 74 MMHG | RESPIRATION RATE: 16 BRPM

## 2023-05-14 DIAGNOSIS — G44.52 NEW DAILY PERSISTENT HEADACHE: ICD-10-CM

## 2023-05-14 LAB
ALBUMIN SERPL BCP-MCNC: 4.6 G/DL (ref 3.2–4.9)
ALBUMIN/GLOB SERPL: 1.5 G/DL
ALP SERPL-CCNC: 86 U/L (ref 30–99)
ALT SERPL-CCNC: 49 U/L (ref 2–50)
ANION GAP SERPL CALC-SCNC: 14 MMOL/L (ref 7–16)
AST SERPL-CCNC: 36 U/L (ref 12–45)
BASOPHILS # BLD AUTO: 0.6 % (ref 0–1.8)
BASOPHILS # BLD: 0.04 K/UL (ref 0–0.12)
BILIRUB SERPL-MCNC: 0.4 MG/DL (ref 0.1–1.5)
BUN SERPL-MCNC: 14 MG/DL (ref 8–22)
CALCIUM ALBUM COR SERPL-MCNC: 9.1 MG/DL (ref 8.5–10.5)
CALCIUM SERPL-MCNC: 9.6 MG/DL (ref 8.5–10.5)
CHLORIDE SERPL-SCNC: 107 MMOL/L (ref 96–112)
CO2 SERPL-SCNC: 21 MMOL/L (ref 20–33)
CREAT SERPL-MCNC: 0.76 MG/DL (ref 0.5–1.4)
EOSINOPHIL # BLD AUTO: 0.06 K/UL (ref 0–0.51)
EOSINOPHIL NFR BLD: 0.9 % (ref 0–6.9)
ERYTHROCYTE [DISTWIDTH] IN BLOOD BY AUTOMATED COUNT: 39.9 FL (ref 35.9–50)
GFR SERPLBLD CREATININE-BSD FMLA CKD-EPI: 98 ML/MIN/1.73 M 2
GLOBULIN SER CALC-MCNC: 3 G/DL (ref 1.9–3.5)
GLUCOSE SERPL-MCNC: 131 MG/DL (ref 65–99)
HCG SERPL QL: NEGATIVE
HCT VFR BLD AUTO: 41.4 % (ref 37–47)
HGB BLD-MCNC: 13.9 G/DL (ref 12–16)
IMM GRANULOCYTES # BLD AUTO: 0.04 K/UL (ref 0–0.11)
IMM GRANULOCYTES NFR BLD AUTO: 0.6 % (ref 0–0.9)
LYMPHOCYTES # BLD AUTO: 1.28 K/UL (ref 1–4.8)
LYMPHOCYTES NFR BLD: 19.6 % (ref 22–41)
MCH RBC QN AUTO: 30.1 PG (ref 27–33)
MCHC RBC AUTO-ENTMCNC: 33.6 G/DL (ref 33.6–35)
MCV RBC AUTO: 89.6 FL (ref 81.4–97.8)
MONOCYTES # BLD AUTO: 0.33 K/UL (ref 0–0.85)
MONOCYTES NFR BLD AUTO: 5.1 % (ref 0–13.4)
NEUTROPHILS # BLD AUTO: 4.78 K/UL (ref 2–7.15)
NEUTROPHILS NFR BLD: 73.2 % (ref 44–72)
NRBC # BLD AUTO: 0 K/UL
NRBC BLD-RTO: 0 /100 WBC
PLATELET # BLD AUTO: 311 K/UL (ref 164–446)
PMV BLD AUTO: 9.3 FL (ref 9–12.9)
POTASSIUM SERPL-SCNC: 3.5 MMOL/L (ref 3.6–5.5)
PROT SERPL-MCNC: 7.6 G/DL (ref 6–8.2)
RBC # BLD AUTO: 4.62 M/UL (ref 4.2–5.4)
SODIUM SERPL-SCNC: 142 MMOL/L (ref 135–145)
WBC # BLD AUTO: 6.5 K/UL (ref 4.8–10.8)

## 2023-05-14 PROCEDURE — 700111 HCHG RX REV CODE 636 W/ 250 OVERRIDE (IP): Mod: UD | Performed by: EMERGENCY MEDICINE

## 2023-05-14 PROCEDURE — 96375 TX/PRO/DX INJ NEW DRUG ADDON: CPT

## 2023-05-14 PROCEDURE — A9270 NON-COVERED ITEM OR SERVICE: HCPCS | Mod: UD | Performed by: EMERGENCY MEDICINE

## 2023-05-14 PROCEDURE — 84703 CHORIONIC GONADOTROPIN ASSAY: CPT

## 2023-05-14 PROCEDURE — 80053 COMPREHEN METABOLIC PANEL: CPT

## 2023-05-14 PROCEDURE — 96374 THER/PROPH/DIAG INJ IV PUSH: CPT

## 2023-05-14 PROCEDURE — 36415 COLL VENOUS BLD VENIPUNCTURE: CPT

## 2023-05-14 PROCEDURE — 99284 EMERGENCY DEPT VISIT MOD MDM: CPT

## 2023-05-14 PROCEDURE — 700102 HCHG RX REV CODE 250 W/ 637 OVERRIDE(OP): Mod: UD | Performed by: EMERGENCY MEDICINE

## 2023-05-14 PROCEDURE — 85025 COMPLETE CBC W/AUTO DIFF WBC: CPT

## 2023-05-14 PROCEDURE — 70450 CT HEAD/BRAIN W/O DYE: CPT

## 2023-05-14 RX ORDER — IBUPROFEN 800 MG/1
800 TABLET ORAL EVERY 8 HOURS PRN
Qty: 30 TABLET | Refills: 0 | Status: ON HOLD | OUTPATIENT
Start: 2023-05-14 | End: 2023-07-06

## 2023-05-14 RX ORDER — ONDANSETRON 2 MG/ML
4 INJECTION INTRAMUSCULAR; INTRAVENOUS ONCE
Status: COMPLETED | OUTPATIENT
Start: 2023-05-14 | End: 2023-05-14

## 2023-05-14 RX ORDER — ACETAMINOPHEN 325 MG/1
975 TABLET ORAL ONCE
Status: COMPLETED | OUTPATIENT
Start: 2023-05-14 | End: 2023-05-14

## 2023-05-14 RX ORDER — KETOROLAC TROMETHAMINE 30 MG/ML
15 INJECTION, SOLUTION INTRAMUSCULAR; INTRAVENOUS ONCE
Status: COMPLETED | OUTPATIENT
Start: 2023-05-14 | End: 2023-05-14

## 2023-05-14 RX ADMIN — KETOROLAC TROMETHAMINE 15 MG: 30 INJECTION, SOLUTION INTRAMUSCULAR; INTRAVENOUS at 13:03

## 2023-05-14 RX ADMIN — ONDANSETRON 4 MG: 2 INJECTION INTRAMUSCULAR; INTRAVENOUS at 12:37

## 2023-05-14 RX ADMIN — ACETAMINOPHEN 975 MG: 325 TABLET, FILM COATED ORAL at 12:35

## 2023-05-14 NOTE — ED NOTES
Discharge teaching and paperwork provided and all questions/concerns answered. VSS, assessment stable and PIV removed. Given information regarding Rx. Patient discharged to the care of self/family and ambulated out of the ED.

## 2023-05-14 NOTE — ED PROVIDER NOTES
ED Provider Note    CHIEF COMPLAINT  Chief Complaint   Patient presents with    Headache     X 8 days, pt was seen by PCP 'many times' and given rx but states no relief, hx of HA, endorses sensitivity to light and sound, no focal deficits noted        EXTERNAL RECORDS REVIEWED  reviewed outpatient clinic visits women health visits    HPI/ROS  LIMITATION TO HISTORY   None  OUTSIDE HISTORIAN(S):  None    Amada Octavio Wilson is a 48 y.o. female who presents evaluation of cephalalgia.  The patient reports developing headaches over the last week or so.  She has history of occasional headaches but this is much worse than usual.  She specifically denies any head trauma or injury.  No report of any neurological symptoms such as double vision, any vision changes numbness weakness tingling to the arms legs or face speech abnormalities ataxia.  She reports a throbbing bifrontal headache with associated nausea.  No report of any fevers neck stiffness or rash.    PAST MEDICAL HISTORY   has a past medical history of Asthma (12/2016) and Hypertension.    SURGICAL HISTORY   has a past surgical history that includes appendectomy.    FAMILY HISTORY  Family History   Problem Relation Age of Onset    Hypertension Mother      No history of brain aneurysm  SOCIAL HISTORY  Social History     Tobacco Use    Smoking status: Never    Smokeless tobacco: Never   Vaping Use    Vaping Use: Never used   Substance and Sexual Activity    Alcohol use: No    Drug use: No    Sexual activity: Yes     Partners: Male     Birth control/protection: Pill     Comment: none. Planned pregnancy.        CURRENT MEDICATIONS  Home Medications       Reviewed by Apolinar Newman R.N. (Registered Nurse) on 05/14/23 at 1052  Med List Status: Not Addressed     Medication Last Dose Status   albuterol (PROVENTIL) 2.5 mg/0.5 mL Nebu Soln  Active   losartan (COZAAR) 50 MG Tab  Active   Prenatal Multivit-Min-Fe-FA (PRENATAL VITAMINS PO)  Active   tolterodine ER  "(DETROL LA) 2 MG CAPSULE SR 24 HR  Active   tolterodine ER (DETROL-LA) 2 MG CAPSULE SR 24 HR  Active                    ALLERGIES  No Known Allergies    PHYSICAL EXAM  VITAL SIGNS: BP (!) 155/97   Pulse 97   Temp 36.5 °C (97.7 °F) (Temporal)   Resp 16   Ht 1.549 m (5' 1\")   Wt 77.6 kg (171 lb 1.2 oz)   SpO2 98%   BMI 32.32 kg/m²    Pulse ox interpretation: I interpret this pulse ox as normal.  Constitutional: Alert and oriented x 3, no acute distress patient appears uncomfortable  HEENT: Atraumatic normocephalic, pupils are equal round reactive to light extraocular movements are intact. The nares is clear, external ears are normal, mouth shows moist mucous membranes normal dentition for age  Neck: Supple, no JVD no tracheal deviation  Cardiovascular: Regular rate and rhythm no murmur rub or gallop 2+ pulses peripherally x4  Thorax & Lungs: No respiratory distress, no wheezes rales or rhonchi, No chest tenderness.   GI: Soft nontender nondistended positive bowel sounds, no peritoneal signs  Skin: Warm dry no acute rash or lesion  Musculoskeletal: Moving all extremities with full range and 5 of 5 strength no acute  deformity  Neurologic: Cranial nerves III through XII are grossly intact no sensory deficit no cerebellar dysfunction no pronator drift of the arms or legs finger-nose testing is normal no ataxia no subjective vision changes.  No focal motor or sensory deficits.  NIH stroke scale score of 0 GCS 15  Psychiatric: A anxious          DIAGNOSTIC STUDIES / PROCEDURES    LABS  Results for orders placed or performed during the hospital encounter of 05/14/23   HCG QUAL SERUM   Result Value Ref Range    Beta-Hcg Qualitative Serum Negative Negative   CBC WITH DIFFERENTIAL   Result Value Ref Range    WBC 6.5 4.8 - 10.8 K/uL    RBC 4.62 4.20 - 5.40 M/uL    Hemoglobin 13.9 12.0 - 16.0 g/dL    Hematocrit 41.4 37.0 - 47.0 %    MCV 89.6 81.4 - 97.8 fL    MCH 30.1 27.0 - 33.0 pg    MCHC 33.6 33.6 - 35.0 g/dL    RDW " 39.9 35.9 - 50.0 fL    Platelet Count 311 164 - 446 K/uL    MPV 9.3 9.0 - 12.9 fL    Neutrophils-Polys 73.20 (H) 44.00 - 72.00 %    Lymphocytes 19.60 (L) 22.00 - 41.00 %    Monocytes 5.10 0.00 - 13.40 %    Eosinophils 0.90 0.00 - 6.90 %    Basophils 0.60 0.00 - 1.80 %    Immature Granulocytes 0.60 0.00 - 0.90 %    Nucleated RBC 0.00 /100 WBC    Neutrophils (Absolute) 4.78 2.00 - 7.15 K/uL    Lymphs (Absolute) 1.28 1.00 - 4.80 K/uL    Monos (Absolute) 0.33 0.00 - 0.85 K/uL    Eos (Absolute) 0.06 0.00 - 0.51 K/uL    Baso (Absolute) 0.04 0.00 - 0.12 K/uL    Immature Granulocytes (abs) 0.04 0.00 - 0.11 K/uL    NRBC (Absolute) 0.00 K/uL   Comp Metabolic Panel   Result Value Ref Range    Sodium 142 135 - 145 mmol/L    Potassium 3.5 (L) 3.6 - 5.5 mmol/L    Chloride 107 96 - 112 mmol/L    Co2 21 20 - 33 mmol/L    Anion Gap 14.0 7.0 - 16.0    Glucose 131 (H) 65 - 99 mg/dL    Bun 14 8 - 22 mg/dL    Creatinine 0.76 0.50 - 1.40 mg/dL    Calcium 9.6 8.5 - 10.5 mg/dL    AST(SGOT) 36 12 - 45 U/L    ALT(SGPT) 49 2 - 50 U/L    Alkaline Phosphatase 86 30 - 99 U/L    Total Bilirubin 0.4 0.1 - 1.5 mg/dL    Albumin 4.6 3.2 - 4.9 g/dL    Total Protein 7.6 6.0 - 8.2 g/dL    Globulin 3.0 1.9 - 3.5 g/dL    A-G Ratio 1.5 g/dL   ESTIMATED GFR   Result Value Ref Range    GFR (CKD-EPI) 98 >60 mL/min/1.73 m 2   CORRECTED CALCIUM   Result Value Ref Range    Correct Calcium 9.1 8.5 - 10.5 mg/dL         RADIOLOGY  I have independently interpreted the diagnostic imaging associated with this visit and am waiting the final reading from the radiologist.   My preliminary interpretation is as follows: No apparent acute process such as hemorrhage mass effect edema  Radiologist interpretation:   CT-HEAD W/O   Final Result      No acute intracranial abnormality.                      COURSE & MEDICAL DECISION MAKING    ED Observation Status? Yes; I am placing the patient in to an observation status due to a diagnostic uncertainty as well as therapeutic  intensity. Patient placed in observation status at  1130 AM, 5/14/2023.     Observation plan is as follows: Tablets an IV, perform serial neurological exams monitor vital signs perform laboratory studies administer parenteral medication and perform CT scan    Upon Reevaluation, the patient's condition has: Improved; and will be discharged.    Patient discharged from ED Observation status at 1315 (Time) 5/14 (Date).     INITIAL ASSESSMENT, COURSE AND PLAN  Care Narrative:       This is a very pleasant 48-year-old female presents here with new onset cephalalgia.  Differential diagnosis was extensive including but not exclusive of tension headache, migraine type headache, intracranial hemorrhage brain mass meningitis.  The patient has reassuring vital signs other than slight elevation of blood pressure.  Her neurological exam was normal and was repeated and is still normal suggesting against CVA.  Patient had extensive evaluation here today.  Her laboratory studies including CBC metabolic panel and pregnancy test are negative and unremarkable.  Specifically there is no leukocytosis or bandemia and I have low suspicion for bacterial meningitis.  CT scan was performed to rule out stroke, mass effect or hemorrhage which was normal.  I performed serial neurological exam there is no deficits.  She was given IV Toradol and Zofran and Tylenol and she feels improved       ADDITIONAL PROBLEM LIST    DISPOSITION AND DISCUSSIONS  I have discussed management of the patient with the following physicians and GORDO's: None    Discussion of management with other QHP or appropriate source(s): None    Escalation of care considered, and ultimately not performed: None    Barriers to care at this time, including but not limited to: None    Decision tools and prescription drugs considered including, but not limited to: Considered opioid therapy but have recommended over-the-counter NSAIDs    FINAL DIAGNOSIS  Acute cephalalgia        Electronically signed by: Joshua Storm M.D., 5/14/2023 11:56 AM

## 2023-05-14 NOTE — ED TRIAGE NOTES
"Chief Complaint   Patient presents with    Headache     X 8 days, pt was seen by PCP 'many times' and given rx but states no relief, hx of HA, endorses sensitivity to light and sound, no focal deficits noted      Pt ambulatory to triage for above complaint, VSS on rA, GCS 15, NAD.     used in triage.    Pt returned to lobby. Educated on triage process and to inform staff of any changes.     BP (!) 155/97   Pulse 97   Temp 36.5 °C (97.7 °F) (Temporal)   Resp 16   Ht 1.549 m (5' 1\")   Wt 77.6 kg (171 lb 1.2 oz)   SpO2 98%   BMI 32.32 kg/m²      "

## 2023-06-07 ENCOUNTER — PRE-ADMISSION TESTING (OUTPATIENT)
Dept: ADMISSIONS | Facility: MEDICAL CENTER | Age: 48
End: 2023-06-07
Attending: OPHTHALMOLOGY
Payer: COMMERCIAL

## 2023-06-07 ENCOUNTER — HOSPITAL ENCOUNTER (OUTPATIENT)
Facility: MEDICAL CENTER | Age: 48
End: 2023-06-07
Attending: OPHTHALMOLOGY | Admitting: OPHTHALMOLOGY
Payer: COMMERCIAL

## 2023-06-12 ENCOUNTER — PRE-ADMISSION TESTING (OUTPATIENT)
Dept: ADMISSIONS | Facility: MEDICAL CENTER | Age: 48
End: 2023-06-12
Attending: OPHTHALMOLOGY
Payer: COMMERCIAL

## 2023-07-06 ENCOUNTER — APPOINTMENT (OUTPATIENT)
Dept: RADIOLOGY | Facility: MEDICAL CENTER | Age: 48
DRG: 690 | End: 2023-07-06
Attending: EMERGENCY MEDICINE
Payer: COMMERCIAL

## 2023-07-06 ENCOUNTER — HOSPITAL ENCOUNTER (INPATIENT)
Facility: MEDICAL CENTER | Age: 48
LOS: 3 days | DRG: 690 | End: 2023-07-09
Attending: EMERGENCY MEDICINE | Admitting: STUDENT IN AN ORGANIZED HEALTH CARE EDUCATION/TRAINING PROGRAM
Payer: COMMERCIAL

## 2023-07-06 DIAGNOSIS — N12 PYELONEPHRITIS: ICD-10-CM

## 2023-07-06 DIAGNOSIS — R51.9 NONINTRACTABLE EPISODIC HEADACHE, UNSPECIFIED HEADACHE TYPE: ICD-10-CM

## 2023-07-06 PROBLEM — R65.10 SIRS (SYSTEMIC INFLAMMATORY RESPONSE SYNDROME) (HCC): Status: ACTIVE | Noted: 2023-07-06

## 2023-07-06 PROBLEM — R73.03 PREDIABETES: Status: ACTIVE | Noted: 2023-07-06

## 2023-07-06 PROBLEM — K21.9 GASTROESOPHAGEAL REFLUX DISEASE WITHOUT ESOPHAGITIS: Status: ACTIVE | Noted: 2023-07-06

## 2023-07-06 PROBLEM — R73.9 HYPERGLYCEMIA: Status: ACTIVE | Noted: 2023-07-06

## 2023-07-06 LAB
ALBUMIN SERPL BCP-MCNC: 4.6 G/DL (ref 3.2–4.9)
ALBUMIN/GLOB SERPL: 1.1 G/DL
ALP SERPL-CCNC: 93 U/L (ref 30–99)
ALT SERPL-CCNC: 28 U/L (ref 2–50)
ANION GAP SERPL CALC-SCNC: 14 MMOL/L (ref 7–16)
APPEARANCE UR: CLEAR
AST SERPL-CCNC: 17 U/L (ref 12–45)
BACTERIA #/AREA URNS HPF: ABNORMAL /HPF
BASOPHILS # BLD AUTO: 0.2 % (ref 0–1.8)
BASOPHILS # BLD: 0.04 K/UL (ref 0–0.12)
BILIRUB SERPL-MCNC: 0.7 MG/DL (ref 0.1–1.5)
BILIRUB UR QL STRIP.AUTO: NEGATIVE
BUN SERPL-MCNC: 7 MG/DL (ref 8–22)
CALCIUM ALBUM COR SERPL-MCNC: 9.4 MG/DL (ref 8.5–10.5)
CALCIUM SERPL-MCNC: 9.9 MG/DL (ref 8.5–10.5)
CHLORIDE SERPL-SCNC: 101 MMOL/L (ref 96–112)
CO2 SERPL-SCNC: 22 MMOL/L (ref 20–33)
COLOR UR: YELLOW
CREAT SERPL-MCNC: 0.89 MG/DL (ref 0.5–1.4)
EOSINOPHIL # BLD AUTO: 0.01 K/UL (ref 0–0.51)
EOSINOPHIL NFR BLD: 0 % (ref 0–6.9)
EPI CELLS #/AREA URNS HPF: NEGATIVE /HPF
ERYTHROCYTE [DISTWIDTH] IN BLOOD BY AUTOMATED COUNT: 40.8 FL (ref 35.9–50)
EST. AVERAGE GLUCOSE BLD GHB EST-MCNC: 117 MG/DL
GFR SERPLBLD CREATININE-BSD FMLA CKD-EPI: 81 ML/MIN/1.73 M 2
GLOBULIN SER CALC-MCNC: 4.3 G/DL (ref 1.9–3.5)
GLUCOSE SERPL-MCNC: 142 MG/DL (ref 65–99)
GLUCOSE UR STRIP.AUTO-MCNC: NEGATIVE MG/DL
HBA1C MFR BLD: 5.7 % (ref 4–5.6)
HCG SERPL QL: NEGATIVE
HCT VFR BLD AUTO: 38.7 % (ref 37–47)
HGB BLD-MCNC: 12.8 G/DL (ref 12–16)
HYALINE CASTS #/AREA URNS LPF: ABNORMAL /LPF
IMM GRANULOCYTES # BLD AUTO: 0.18 K/UL (ref 0–0.11)
IMM GRANULOCYTES NFR BLD AUTO: 0.9 % (ref 0–0.9)
KETONES UR STRIP.AUTO-MCNC: NEGATIVE MG/DL
LACTATE SERPL-SCNC: 1.6 MMOL/L (ref 0.5–2)
LEUKOCYTE ESTERASE UR QL STRIP.AUTO: ABNORMAL
LYMPHOCYTES # BLD AUTO: 0.9 K/UL (ref 1–4.8)
LYMPHOCYTES NFR BLD: 4.4 % (ref 22–41)
MCH RBC QN AUTO: 30.1 PG (ref 27–33)
MCHC RBC AUTO-ENTMCNC: 33.1 G/DL (ref 32.2–35.5)
MCV RBC AUTO: 91.1 FL (ref 81.4–97.8)
MICRO URNS: ABNORMAL
MONOCYTES # BLD AUTO: 1.03 K/UL (ref 0–0.85)
MONOCYTES NFR BLD AUTO: 5 % (ref 0–13.4)
NEUTROPHILS # BLD AUTO: 18.28 K/UL (ref 1.82–7.42)
NEUTROPHILS NFR BLD: 89.5 % (ref 44–72)
NITRITE UR QL STRIP.AUTO: NEGATIVE
NRBC # BLD AUTO: 0 K/UL
NRBC BLD-RTO: 0 /100 WBC (ref 0–0.2)
PH UR STRIP.AUTO: 6.5 [PH] (ref 5–8)
PLATELET # BLD AUTO: 327 K/UL (ref 164–446)
PMV BLD AUTO: 8.5 FL (ref 9–12.9)
POTASSIUM SERPL-SCNC: 3.8 MMOL/L (ref 3.6–5.5)
PROT SERPL-MCNC: 8.9 G/DL (ref 6–8.2)
PROT UR QL STRIP: 30 MG/DL
RBC # BLD AUTO: 4.25 M/UL (ref 4.2–5.4)
RBC # URNS HPF: ABNORMAL /HPF
RBC UR QL AUTO: ABNORMAL
SODIUM SERPL-SCNC: 137 MMOL/L (ref 135–145)
SP GR UR STRIP.AUTO: 1.01
UROBILINOGEN UR STRIP.AUTO-MCNC: 1 MG/DL
WBC # BLD AUTO: 20.4 K/UL (ref 4.8–10.8)
WBC #/AREA URNS HPF: ABNORMAL /HPF

## 2023-07-06 PROCEDURE — 36415 COLL VENOUS BLD VENIPUNCTURE: CPT

## 2023-07-06 PROCEDURE — 700111 HCHG RX REV CODE 636 W/ 250 OVERRIDE (IP): Mod: JZ,UD | Performed by: EMERGENCY MEDICINE

## 2023-07-06 PROCEDURE — 700102 HCHG RX REV CODE 250 W/ 637 OVERRIDE(OP)

## 2023-07-06 PROCEDURE — 80053 COMPREHEN METABOLIC PANEL: CPT

## 2023-07-06 PROCEDURE — 770006 HCHG ROOM/CARE - MED/SURG/GYN SEMI*

## 2023-07-06 PROCEDURE — 96375 TX/PRO/DX INJ NEW DRUG ADDON: CPT

## 2023-07-06 PROCEDURE — 87077 CULTURE AEROBIC IDENTIFY: CPT | Mod: 91

## 2023-07-06 PROCEDURE — A9270 NON-COVERED ITEM OR SERVICE: HCPCS

## 2023-07-06 PROCEDURE — 96374 THER/PROPH/DIAG INJ IV PUSH: CPT

## 2023-07-06 PROCEDURE — 700102 HCHG RX REV CODE 250 W/ 637 OVERRIDE(OP): Performed by: FAMILY MEDICINE

## 2023-07-06 PROCEDURE — 700117 HCHG RX CONTRAST REV CODE 255: Performed by: EMERGENCY MEDICINE

## 2023-07-06 PROCEDURE — 83036 HEMOGLOBIN GLYCOSYLATED A1C: CPT

## 2023-07-06 PROCEDURE — 700105 HCHG RX REV CODE 258: Mod: JZ,UD | Performed by: EMERGENCY MEDICINE

## 2023-07-06 PROCEDURE — 87086 URINE CULTURE/COLONY COUNT: CPT

## 2023-07-06 PROCEDURE — 71045 X-RAY EXAM CHEST 1 VIEW: CPT

## 2023-07-06 PROCEDURE — A9270 NON-COVERED ITEM OR SERVICE: HCPCS | Performed by: FAMILY MEDICINE

## 2023-07-06 PROCEDURE — 99223 1ST HOSP IP/OBS HIGH 75: CPT | Performed by: STUDENT IN AN ORGANIZED HEALTH CARE EDUCATION/TRAINING PROGRAM

## 2023-07-06 PROCEDURE — 87186 SC STD MICRODIL/AGAR DIL: CPT

## 2023-07-06 PROCEDURE — 85025 COMPLETE CBC W/AUTO DIFF WBC: CPT

## 2023-07-06 PROCEDURE — 81001 URINALYSIS AUTO W/SCOPE: CPT

## 2023-07-06 PROCEDURE — 99285 EMERGENCY DEPT VISIT HI MDM: CPT

## 2023-07-06 PROCEDURE — 87040 BLOOD CULTURE FOR BACTERIA: CPT | Mod: 91

## 2023-07-06 PROCEDURE — 84703 CHORIONIC GONADOTROPIN ASSAY: CPT

## 2023-07-06 PROCEDURE — 74177 CT ABD & PELVIS W/CONTRAST: CPT

## 2023-07-06 PROCEDURE — 700105 HCHG RX REV CODE 258: Performed by: STUDENT IN AN ORGANIZED HEALTH CARE EDUCATION/TRAINING PROGRAM

## 2023-07-06 PROCEDURE — 83605 ASSAY OF LACTIC ACID: CPT

## 2023-07-06 PROCEDURE — A9270 NON-COVERED ITEM OR SERVICE: HCPCS | Performed by: STUDENT IN AN ORGANIZED HEALTH CARE EDUCATION/TRAINING PROGRAM

## 2023-07-06 PROCEDURE — 700102 HCHG RX REV CODE 250 W/ 637 OVERRIDE(OP): Performed by: STUDENT IN AN ORGANIZED HEALTH CARE EDUCATION/TRAINING PROGRAM

## 2023-07-06 RX ORDER — OXYCODONE HYDROCHLORIDE 5 MG/1
5-10 TABLET ORAL EVERY 4 HOURS PRN
Status: DISCONTINUED | OUTPATIENT
Start: 2023-07-06 | End: 2023-07-09 | Stop reason: HOSPADM

## 2023-07-06 RX ORDER — ACETAMINOPHEN 325 MG/1
650 TABLET ORAL EVERY 6 HOURS PRN
Status: DISCONTINUED | OUTPATIENT
Start: 2023-07-06 | End: 2023-07-09 | Stop reason: HOSPADM

## 2023-07-06 RX ORDER — SULFAMETHOXAZOLE AND TRIMETHOPRIM 800; 160 MG/1; MG/1
1 TABLET ORAL 2 TIMES DAILY
Status: ON HOLD | COMMUNITY
Start: 2023-07-03 | End: 2023-07-09

## 2023-07-06 RX ORDER — OFLOXACIN 3 MG/ML
1 SOLUTION/ DROPS OPHTHALMIC 4 TIMES DAILY
COMMUNITY
Start: 2023-06-06

## 2023-07-06 RX ORDER — OMEPRAZOLE 20 MG/1
20 CAPSULE, DELAYED RELEASE ORAL
COMMUNITY
Start: 2023-05-17

## 2023-07-06 RX ORDER — SULFAMETHOXAZOLE AND TRIMETHOPRIM 400; 80 MG/1; MG/1
1 TABLET ORAL 2 TIMES DAILY
Status: ON HOLD | COMMUNITY
End: 2023-07-06

## 2023-07-06 RX ORDER — POLYETHYLENE GLYCOL 3350 17 G/17G
1 POWDER, FOR SOLUTION ORAL
Status: DISCONTINUED | OUTPATIENT
Start: 2023-07-06 | End: 2023-07-09 | Stop reason: HOSPADM

## 2023-07-06 RX ORDER — SODIUM CHLORIDE 9 MG/ML
INJECTION, SOLUTION INTRAVENOUS CONTINUOUS
Status: DISCONTINUED | OUTPATIENT
Start: 2023-07-06 | End: 2023-07-09 | Stop reason: HOSPADM

## 2023-07-06 RX ORDER — ONDANSETRON 2 MG/ML
4 INJECTION INTRAMUSCULAR; INTRAVENOUS ONCE
Status: COMPLETED | OUTPATIENT
Start: 2023-07-06 | End: 2023-07-06

## 2023-07-06 RX ORDER — BISACODYL 10 MG
10 SUPPOSITORY, RECTAL RECTAL
Status: DISCONTINUED | OUTPATIENT
Start: 2023-07-06 | End: 2023-07-09 | Stop reason: HOSPADM

## 2023-07-06 RX ORDER — PREDNISOLONE ACETATE 10 MG/ML
1 SUSPENSION/ DROPS OPHTHALMIC 4 TIMES DAILY
COMMUNITY
Start: 2023-06-06

## 2023-07-06 RX ORDER — AMOXICILLIN 250 MG
2 CAPSULE ORAL 2 TIMES DAILY
Status: DISCONTINUED | OUTPATIENT
Start: 2023-07-06 | End: 2023-07-09 | Stop reason: HOSPADM

## 2023-07-06 RX ORDER — CEFTRIAXONE 2 G/1
2000 INJECTION, POWDER, FOR SOLUTION INTRAMUSCULAR; INTRAVENOUS ONCE
Status: COMPLETED | OUTPATIENT
Start: 2023-07-06 | End: 2023-07-06

## 2023-07-06 RX ORDER — SODIUM CHLORIDE, SODIUM LACTATE, POTASSIUM CHLORIDE, AND CALCIUM CHLORIDE .6; .31; .03; .02 G/100ML; G/100ML; G/100ML; G/100ML
30 INJECTION, SOLUTION INTRAVENOUS ONCE
Status: COMPLETED | OUTPATIENT
Start: 2023-07-06 | End: 2023-07-06

## 2023-07-06 RX ORDER — MORPHINE SULFATE 4 MG/ML
4 INJECTION INTRAVENOUS ONCE
Status: COMPLETED | OUTPATIENT
Start: 2023-07-06 | End: 2023-07-06

## 2023-07-06 RX ORDER — OMEPRAZOLE 20 MG/1
20 CAPSULE, DELAYED RELEASE ORAL DAILY
Status: DISCONTINUED | OUTPATIENT
Start: 2023-07-06 | End: 2023-07-09 | Stop reason: HOSPADM

## 2023-07-06 RX ORDER — ACETAMINOPHEN 500 MG
1000 TABLET ORAL ONCE
Status: COMPLETED | OUTPATIENT
Start: 2023-07-06 | End: 2023-07-06

## 2023-07-06 RX ORDER — NITROFURANTOIN 25; 75 MG/1; MG/1
100 CAPSULE ORAL EVERY 12 HOURS
Status: ON HOLD | COMMUNITY
Start: 2023-06-26 | End: 2023-07-09

## 2023-07-06 RX ORDER — LABETALOL HYDROCHLORIDE 5 MG/ML
10 INJECTION, SOLUTION INTRAVENOUS EVERY 4 HOURS PRN
Status: DISCONTINUED | OUTPATIENT
Start: 2023-07-06 | End: 2023-07-09 | Stop reason: HOSPADM

## 2023-07-06 RX ORDER — BUTALBITAL, ACETAMINOPHEN AND CAFFEINE 50; 325; 40 MG/1; MG/1; MG/1
1 TABLET ORAL EVERY 6 HOURS PRN
Status: DISCONTINUED | OUTPATIENT
Start: 2023-07-06 | End: 2023-07-09 | Stop reason: HOSPADM

## 2023-07-06 RX ORDER — ACETAMINOPHEN 325 MG/1
650 TABLET ORAL EVERY 6 HOURS PRN
COMMUNITY

## 2023-07-06 RX ADMIN — MORPHINE SULFATE 4 MG: 4 INJECTION INTRAVENOUS at 02:25

## 2023-07-06 RX ADMIN — CEFTRIAXONE SODIUM 2000 MG: 2 INJECTION, POWDER, FOR SOLUTION INTRAMUSCULAR; INTRAVENOUS at 03:36

## 2023-07-06 RX ADMIN — OMEPRAZOLE 20 MG: 20 CAPSULE, DELAYED RELEASE ORAL at 05:48

## 2023-07-06 RX ADMIN — SENNOSIDES AND DOCUSATE SODIUM 2 TABLET: 50; 8.6 TABLET ORAL at 18:02

## 2023-07-06 RX ADMIN — ACETAMINOPHEN 650 MG: 325 TABLET, FILM COATED ORAL at 14:50

## 2023-07-06 RX ADMIN — ACETAMINOPHEN 650 MG: 325 TABLET, FILM COATED ORAL at 21:21

## 2023-07-06 RX ADMIN — ACETAMINOPHEN 650 MG: 325 TABLET, FILM COATED ORAL at 07:40

## 2023-07-06 RX ADMIN — BUTALBITAL, ACETAMINOPHEN AND CAFFEINE 1 TABLET: 325; 50; 40 TABLET ORAL at 11:11

## 2023-07-06 RX ADMIN — SODIUM CHLORIDE, POTASSIUM CHLORIDE, SODIUM LACTATE AND CALCIUM CHLORIDE 2292 ML: 600; 310; 30; 20 INJECTION, SOLUTION INTRAVENOUS at 02:28

## 2023-07-06 RX ADMIN — ONDANSETRON 4 MG: 2 INJECTION INTRAMUSCULAR; INTRAVENOUS at 02:25

## 2023-07-06 RX ADMIN — SENNOSIDES AND DOCUSATE SODIUM 2 TABLET: 50; 8.6 TABLET ORAL at 05:48

## 2023-07-06 RX ADMIN — IOHEXOL 100 ML: 350 INJECTION, SOLUTION INTRAVENOUS at 04:20

## 2023-07-06 RX ADMIN — ACETAMINOPHEN 1000 MG: 500 TABLET, FILM COATED ORAL at 04:29

## 2023-07-06 RX ADMIN — SODIUM CHLORIDE: 9 INJECTION, SOLUTION INTRAVENOUS at 05:31

## 2023-07-06 ASSESSMENT — LIFESTYLE VARIABLES
EVER HAD A DRINK FIRST THING IN THE MORNING TO STEADY YOUR NERVES TO GET RID OF A HANGOVER: NO
TOTAL SCORE: 0
TOTAL SCORE: 0
EVER HAD A DRINK FIRST THING IN THE MORNING TO STEADY YOUR NERVES TO GET RID OF A HANGOVER: NO
HAVE PEOPLE ANNOYED YOU BY CRITICIZING YOUR DRINKING: NO
ON A TYPICAL DAY WHEN YOU DRINK ALCOHOL HOW MANY DRINKS DO YOU HAVE: 0
HAVE YOU EVER FELT YOU SHOULD CUT DOWN ON YOUR DRINKING: NO
CONSUMPTION TOTAL: INCOMPLETE
TOTAL SCORE: 0
ALCOHOL_USE: NO
HOW MANY TIMES IN THE PAST YEAR HAVE YOU HAD 5 OR MORE DRINKS IN A DAY: 0
EVER FELT BAD OR GUILTY ABOUT YOUR DRINKING: NO
HAVE YOU EVER FELT YOU SHOULD CUT DOWN ON YOUR DRINKING: NO
ALCOHOL_USE: NO
CONSUMPTION TOTAL: NEGATIVE
EVER FELT BAD OR GUILTY ABOUT YOUR DRINKING: NO
DOES PATIENT WANT TO STOP DRINKING: NO
TOTAL SCORE: 0
HAVE PEOPLE ANNOYED YOU BY CRITICIZING YOUR DRINKING: NO
AVERAGE NUMBER OF DAYS PER WEEK YOU HAVE A DRINK CONTAINING ALCOHOL: 0
TOTAL SCORE: 0
TOTAL SCORE: 0
DOES PATIENT WANT TO STOP DRINKING: NO

## 2023-07-06 ASSESSMENT — COGNITIVE AND FUNCTIONAL STATUS - GENERAL
SUGGESTED CMS G CODE MODIFIER DAILY ACTIVITY: CH
DAILY ACTIVITIY SCORE: 24
SUGGESTED CMS G CODE MODIFIER MOBILITY: CH
MOBILITY SCORE: 24

## 2023-07-06 ASSESSMENT — ENCOUNTER SYMPTOMS
ABDOMINAL PAIN: 1
SHORTNESS OF BREATH: 0
NAUSEA: 0
COUGH: 0
DIARRHEA: 0
CHILLS: 1
VOMITING: 0
FEVER: 1

## 2023-07-06 ASSESSMENT — PAIN DESCRIPTION - PAIN TYPE
TYPE: ACUTE PAIN

## 2023-07-06 ASSESSMENT — FIBROSIS 4 INDEX
FIB4 SCORE: 0.45
FIB4 SCORE: 0.76

## 2023-07-06 ASSESSMENT — PATIENT HEALTH QUESTIONNAIRE - PHQ9
8. MOVING OR SPEAKING SO SLOWLY THAT OTHER PEOPLE COULD HAVE NOTICED. OR THE OPPOSITE, BEING SO FIGETY OR RESTLESS THAT YOU HAVE BEEN MOVING AROUND A LOT MORE THAN USUAL: NOT AT ALL
6. FEELING BAD ABOUT YOURSELF - OR THAT YOU ARE A FAILURE OR HAVE LET YOURSELF OR YOUR FAMILY DOWN: NOT AL ALL
SUM OF ALL RESPONSES TO PHQ QUESTIONS 1-9: 3
SUM OF ALL RESPONSES TO PHQ9 QUESTIONS 1 AND 2: 1
4. FEELING TIRED OR HAVING LITTLE ENERGY: SEVERAL DAYS
3. TROUBLE FALLING OR STAYING ASLEEP OR SLEEPING TOO MUCH: SEVERAL DAYS
2. FEELING DOWN, DEPRESSED, IRRITABLE, OR HOPELESS: NOT AT ALL
1. LITTLE INTEREST OR PLEASURE IN DOING THINGS: SEVERAL DAYS
7. TROUBLE CONCENTRATING ON THINGS, SUCH AS READING THE NEWSPAPER OR WATCHING TELEVISION: NOT AT ALL
5. POOR APPETITE OR OVEREATING: NOT AT ALL
9. THOUGHTS THAT YOU WOULD BE BETTER OFF DEAD, OR OF HURTING YOURSELF: NOT AT ALL

## 2023-07-06 NOTE — ED TRIAGE NOTES
"Chief Complaint   Patient presents with    UTI     Patient reports having UTI symptoms x1 week, was seen at the clinic given antibiotics but pain has worsened. Reports flank pain, fever, dysuria, n/v.      Sepsis protocol ordered due to previous UTI diagnosis and failed outpatient antibiotics.     /86   Pulse (!) 125   Temp 36.6 °C (97.9 °F) (Temporal)   Resp 18   Ht 1.6 m (5' 3\")   Wt 76.4 kg (168 lb 6.9 oz)   SpO2 97%     Patient educated on ed triage process, instructed to notify staff of any new or worsening symptoms, verbalizes understanding. Patient returned to ed lobby, apologized for wait times.     "

## 2023-07-06 NOTE — ED NOTES
Transport at BS  Pt transported upstairs in stable condition w/ chart and all belongings accompanied by

## 2023-07-06 NOTE — PROGRESS NOTES
4 Eyes Skin Assessment Completed by CEZAR Olivera and CEZAR Beltrán.    Head WDL  Ears WDL  Nose WDL  Mouth WDL  Neck WDL  Breast/Chest WDL  Shoulder Blades WDL  Spine WDL  (R) Arm/Elbow/Hand WDL  (L) Arm/Elbow/Hand WDL  Abdomen WDL  Groin WDL  Scrotum/Coccyx/Buttocks WDL  (R) Leg WDL  (L) Leg WDL  (R) Heel/Foot/Toe WDL  (L) Heel/Foot/Toe WDL          Devices In Places Blood Pressure Cuff      Interventions In Place N/A    Possible Skin Injury No    Pictures Uploaded Into Epic N/A  Wound Consult Placed N/A  RN Wound Prevention Protocol Ordered No     No

## 2023-07-06 NOTE — ED PROVIDER NOTES
ED Provider Note    CHIEF COMPLAINT  Chief Complaint   Patient presents with    UTI     Patient reports having UTI symptoms x1 week, was seen at the clinic given antibiotics but pain has worsened. Reports flank pain, fever, dysuria, n/v.        EXTERNAL RECORDS REVIEWED  Inpatient Notes ER visit 5/14/2023 for headache and Outpatient Notes outpatient notes for upcoming phonologic surgery tomorrow    HPI/ROS  LIMITATION TO HISTORY   Select: Language Latvian,  Used   OUTSIDE HISTORIAN(S):  Significant other  at bedside    Amada Wilson is a 48 y.o. female who presents to the emergency department stating that she was recently diagnosed with urinary tract infection symptoms.  Has had ongoing symptoms for 1 week.  Has now been on Bactrim for 2 days but now with worsening bilateral flank pain, fever, chills and persistent nausea, vomiting and dysuria.    Due to her worsening symptoms she decided to come to the emergency room as she was concerned that the antibiotics were not working.  Furthermore she is concerned that she is to have cataract surgery tomorrow        PAST MEDICAL HISTORY   has a past medical history of Anemia, Asthma (12/2016), Cataract, Gastroesophageal reflux disease without esophagitis (7/6/2023), Glaucoma, and Hypertension.    SURGICAL HISTORY   has a past surgical history that includes appendectomy.    FAMILY HISTORY  Family History   Problem Relation Age of Onset    Hypertension Mother        SOCIAL HISTORY  Social History     Tobacco Use    Smoking status: Never    Smokeless tobacco: Never   Vaping Use    Vaping Use: Never used   Substance and Sexual Activity    Alcohol use: No    Drug use: No    Sexual activity: Yes     Partners: Male     Birth control/protection: Pill     Comment: none. Planned pregnancy.        CURRENT MEDICATIONS  Home Medications       Reviewed by Iesha Alicia R.N. (Registered Nurse) on 07/06/23 at 0016  Med List Status: Not Addressed  "    Medication Last Dose Status   asa/apap/caffeine (EXCEDRIN) 250-250-65 MG Tab  Active   ibuprofen (MOTRIN) 800 MG Tab  Active   losartan (COZAAR) 50 MG Tab  Active   multivitamin Tab  Active   Non Formulary Request  Active   OMEPRAZOLE PO  Active   sulfamethoxazole-trimethoprim (BACTRIM) 400-80 MG Tab  Active                    ALLERGIES  No Known Allergies    PHYSICAL EXAM  VITAL SIGNS: /60   Pulse (!) 111   Temp (!) 38.3 °C (100.9 °F) (Temporal)   Resp (!) 25   Ht 1.6 m (5' 3\")   Wt 76.4 kg (168 lb 6.9 oz)   LMP 05/28/2023 (Exact Date)   SpO2 96%   BMI 29.84 kg/m²      Pulse ox interpretation: I interpret this pulse ox as normal.  Constitutional: Alert in no apparent distress.  Appears fatigued  HENT: No signs of trauma, Bilateral external ears normal, Nose normal.   Eyes: Pupils are equal and reactive  Neck: Normal range of motion, No tenderness, Supple  Cardiovascular: Regular rate and rhythm, no murmurs.   Thorax & Lungs: Normal breath sounds, No respiratory distress, No wheezing, No chest tenderness.   Abdomen: Bowel sounds normal, Soft, No tenderness  Skin: Warm, Dry, No erythema, No rash.   Back: No bony tenderness, mild bilateral CVA tenderness  Extremities: Intact distal pulses  Musculoskeletal: Good range of motion in all major joints. No tenderness to palpation or major deformities noted.   Neurologic: Alert , Normal motor function, Normal sensory function, No focal deficits noted.   Psychiatric: Affect normal, Judgment normal, Mood normal.         DIAGNOSTIC STUDIES / PROCEDURES      LABS  Results for orders placed or performed during the hospital encounter of 07/06/23   Lactic acid (lactate)   Result Value Ref Range    Lactic Acid 1.6 0.5 - 2.0 mmol/L   CBC With Differential   Result Value Ref Range    WBC 20.4 (H) 4.8 - 10.8 K/uL    RBC 4.25 4.20 - 5.40 M/uL    Hemoglobin 12.8 12.0 - 16.0 g/dL    Hematocrit 38.7 37.0 - 47.0 %    MCV 91.1 81.4 - 97.8 fL    MCH 30.1 27.0 - 33.0 pg    " MCHC 33.1 32.2 - 35.5 g/dL    RDW 40.8 35.9 - 50.0 fL    Platelet Count 327 164 - 446 K/uL    MPV 8.5 (L) 9.0 - 12.9 fL    Neutrophils-Polys 89.50 (H) 44.00 - 72.00 %    Lymphocytes 4.40 (L) 22.00 - 41.00 %    Monocytes 5.00 0.00 - 13.40 %    Eosinophils 0.00 0.00 - 6.90 %    Basophils 0.20 0.00 - 1.80 %    Immature Granulocytes 0.90 0.00 - 0.90 %    Nucleated RBC 0.00 0.00 - 0.20 /100 WBC    Neutrophils (Absolute) 18.28 (H) 1.82 - 7.42 K/uL    Lymphs (Absolute) 0.90 (L) 1.00 - 4.80 K/uL    Monos (Absolute) 1.03 (H) 0.00 - 0.85 K/uL    Eos (Absolute) 0.01 0.00 - 0.51 K/uL    Baso (Absolute) 0.04 0.00 - 0.12 K/uL    Immature Granulocytes (abs) 0.18 (H) 0.00 - 0.11 K/uL    NRBC (Absolute) 0.00 K/uL   Comp Metabolic Panel   Result Value Ref Range    Sodium 137 135 - 145 mmol/L    Potassium 3.8 3.6 - 5.5 mmol/L    Chloride 101 96 - 112 mmol/L    Co2 22 20 - 33 mmol/L    Anion Gap 14.0 7.0 - 16.0    Glucose 142 (H) 65 - 99 mg/dL    Bun 7 (L) 8 - 22 mg/dL    Creatinine 0.89 0.50 - 1.40 mg/dL    Calcium 9.9 8.5 - 10.5 mg/dL    AST(SGOT) 17 12 - 45 U/L    ALT(SGPT) 28 2 - 50 U/L    Alkaline Phosphatase 93 30 - 99 U/L    Total Bilirubin 0.7 0.1 - 1.5 mg/dL    Albumin 4.6 3.2 - 4.9 g/dL    Total Protein 8.9 (H) 6.0 - 8.2 g/dL    Globulin 4.3 (H) 1.9 - 3.5 g/dL    A-G Ratio 1.1 g/dL   Urinalysis    Specimen: Urine   Result Value Ref Range    Color Yellow     Character Clear     Specific Gravity 1.011 <1.035    Ph 6.5 5.0 - 8.0    Glucose Negative Negative mg/dL    Ketones Negative Negative mg/dL    Protein 30 (A) Negative mg/dL    Bilirubin Negative Negative    Urobilinogen, Urine 1.0 Negative    Nitrite Negative Negative    Leukocyte Esterase Large (A) Negative    Occult Blood Moderate (A) Negative    Micro Urine Req Microscopic    BETA-HCG QUALITATIVE SERUM   Result Value Ref Range    Beta-Hcg Qualitative Serum Negative Negative   URINE MICROSCOPIC (W/UA)   Result Value Ref Range    WBC Packed (A) /hpf    RBC 20-50 (A) /hpf     Bacteria Moderate (A) None /hpf    Epithelial Cells Negative /hpf    Hyaline Cast 0-2 /lpf   CORRECTED CALCIUM   Result Value Ref Range    Correct Calcium 9.4 8.5 - 10.5 mg/dL   ESTIMATED GFR   Result Value Ref Range    GFR (CKD-EPI) 81 >60 mL/min/1.73 m 2         RADIOLOGY  I have independently interpreted the diagnostic imaging associated with this visit and am waiting the final reading from the radiologist.   My preliminary interpretation is as follows: No evidence of ureterolithiasis  Radiologist interpretation:   CT-ABDOMEN-PELVIS WITH   Final Result         1.  Striated enhancement pattern of the left kidney with slight adjacent hazy fat stranding and enhancement of the left ureter, appearance favoring changes of pyelonephritis.   2.  Hepatomegaly      DX-CHEST-PORTABLE (1 VIEW)   Final Result      No radiographic evidence of acute cardiopulmonary process with decrease in lung volumes.            COURSE & MEDICAL DECISION MAKING    ED Observation Status? Yes; I am placing the patient in to an observation status due to a diagnostic uncertainty as well as therapeutic intensity. Patient placed in observation status at 1:51 AM, 7/6/2023.     Observation plan is as follows: 48-year-old presenting to the emerged department with above clinical presentation.  High suspicion for urosepsis.  Will rule out infected stone    Upon Reevaluation, the patient's condition has: not improved; and will be escalated to hospitalization.    Patient discharged from ED Observation status at 0 415 (Time) 7/6/2023 (Date).     INITIAL ASSESSMENT, COURSE AND PLAN  Care Narrative: 48-year-old presenting the emergency department with above presentation.  We will follow observation plan stated above  DISPOSITION AND DISCUSSIONS  I have discussed management of the patient with the following physicians and GORDO's: Hospitalist    Discussion of management with other QHP or appropriate source(s): Pharmacy for medication verification       48-year-old female presenting to the emerged part with above presentation.  While she has taken 2 days of outpatient antibiotics the patient has continued to clinically work in.  Now meeting SIRS criteria.  Sepsis protocol has been followed.  IV fluids and initiation of antibiotics have been started early.  CT imaging has been performed to rule out any other intra-abdominal processes to include possibility of infected stone although clinically this is felt less likely.    At this point the CT is unremarkable other than the correlative finding of pyelonephritis.  Again the patient has been treated from a pain and nausea standpoint.  IV fluids and antibiotics have been started.    Hospitalist agreeable to ongoing inpatient care at this time.    FINAL DIAGNOSIS  1. Pyelonephritis           Electronically signed by: Joshua Dubose M.D., 7/6/2023 1:51 AM

## 2023-07-06 NOTE — ED NOTES
US PIV est, first set of blood cultures collected and sent  Pt medicated per MAR for N/V/pain and LR bolus per sepsis protocol  Pt on the monitor and resting w/ call light in reach

## 2023-07-06 NOTE — PROGRESS NOTES
Admitted with Pyelonephritis, started on empiric coverage with IV Rocephin, IVF hydration.    A qualified  was used to interpret [facility] during this encounter.  ’s name/ID number was [id number] and mode of interpretation was iPad.

## 2023-07-06 NOTE — ASSESSMENT & PLAN NOTE
SIRS criteria identified on my evaluation include:  Fever, with temperature greater than 100.9 deg F, Tachycardia, with heart rate greater than 90 BPM, Tachypnea, with respirations greater than 20 per minute and Leukocytosis, with WBC greater than 12,000

## 2023-07-06 NOTE — CARE PLAN
"Pt reports moderate flank and head pain, receiving prn pain meds, see MAR. Up ind. Reports some dysuria. A&Ox4. Call light in reach.       /79   Pulse (!) 118   Temp 37.8 °C (100.1 °F) (Oral)   Resp 20   Ht 1.6 m (5' 3\")   Wt 76.2 kg (168 lb)   LMP 05/28/2023 (Exact Date)   SpO2 97%   BMI 29.76 kg/m²     " PA approved effective 1/1/2019-2/9/2020; [facility] notified of the approval.

## 2023-07-06 NOTE — CARE PLAN
The patient is Stable - Low risk of patient condition declining or worsening    Shift Goals  Clinical Goals: pt will settle in and get IV fluids  Patient Goals: comfort    Progress made toward(s) clinical / shift goals:  Pt is settled in and receiving IV fluids and other medications per MAR    Problem: Pain - Standard  Goal: Alleviation of pain or a reduction in pain to the patient’s comfort goal  Outcome: Progressing     Problem: Knowledge Deficit - Standard  Goal: Patient and family/care givers will demonstrate understanding of plan of care, disease process/condition, diagnostic tests and medications  Outcome: Progressing     Patient is not progressing towards the following goals:

## 2023-07-06 NOTE — H&P
[facility] Medicine History & Physical Note    Date of Service  7/6/2023    Primary Care Physician  GEORGE Junior    Code Status  Full Code    Chief Complaint  Chief Complaint   Patient presents with    UTI     Patient reports having UTI symptoms x1 week, was seen at the clinic given antibiotics but pain has worsened. Reports flank pain, fever, dysuria, n/v.        History of Presenting Illness  Amada Wilson is a 48 y.o. female who presented 7/6/2023 with dysuria and abdominal pain.  Patient comes in with worsening urinary symptoms, abdominal pain, bilateral flank pain worse on the right.  Symptoms have been going on for a week, she went to outpatient clinic a few days ago and was given Bactrim, she took 1 day of Bactrim but continues to have worsening pain and fever associated coming to the ED.    In the ED febrile to 100.9, tachycardic and tachypneic.  Labs showed WBC of 20, UA infectious appearing.    I discussed the plan of care with patient, family, bedside RN, and EDP .    Review of Systems  Review of Systems   Constitutional:  Positive for chills and fever.   Respiratory:  Negative for cough and shortness of breath.    Cardiovascular:  Negative for chest pain.   Gastrointestinal:  Positive for abdominal pain. Negative for diarrhea, nausea and vomiting.   Genitourinary:  Positive for dysuria. Negative for urgency.       Past Medical History   has a past medical history of Anemia, Asthma (12/2016), Cataract, Gastroesophageal reflux disease without esophagitis (7/6/2023), Glaucoma, and Hypertension.    Surgical History   has a past surgical history that includes appendectomy.     Family History  family history includes Hypertension in her mother.   Family history reviewed with patient. There is no family history that is pertinent to the chief complaint.     Social History   reports that she has never smoked. She has never used smokeless tobacco. She reports that she does not drink  "alcohol and does not use drugs.    Allergies  No Known Allergies    Medications  Prior to Admission Medications   Prescriptions Last Dose Informant Patient Reported? Taking?   Non Formulary Request  Family Member Yes No   Sig: Take 1 Scoop by mouth every day. OTC \"Surcacil\" OTC for gastritis   OMEPRAZOLE PO  Family Member Yes No   Sig: Take 1 Tablet by mouth every day.   asa/apap/caffeine (EXCEDRIN) 250-250-65 MG Tab  Family Member Yes No   Sig: Take 1 Tablet by mouth every 6 hours as needed for Headache.   ibuprofen (MOTRIN) 800 MG Tab  Family Member No No   Sig: Take 1 Tablet by mouth every 8 hours as needed for Headache.   losartan (COZAAR) 50 MG Tab  Family Member Yes No   Sig: Take 50 mg by mouth every day.   multivitamin Tab  Family Member Yes No   Sig: Take 1 Tablet by mouth every day.   sulfamethoxazole-trimethoprim (BACTRIM) 400-80 MG Tab   Yes Yes   Sig: Take 1 Tablet by mouth 2 times a day.      Facility-Administered Medications: None       Physical Exam  Temp:  [36.6 °C (97.9 °F)-38.3 °C (100.9 °F)] 37.6 °C (99.6 °F)  Pulse:  [108-125] 118  Resp:  [18-28] 20  BP: (108-128)/(53-86) 125/53  SpO2:  [94 %-97 %] 96 %  Blood Pressure: 108/60   Temperature: (!) 38.3 °C (100.9 °F)   Pulse: (!) 112   Respiration: (!) 25   Pulse Oximetry: 95 %       Physical Exam  Constitutional:       Appearance: Normal appearance.   HENT:      Head: Normocephalic and atraumatic.      Mouth/Throat:      Mouth: Mucous membranes are moist.      Pharynx: No oropharyngeal exudate or posterior oropharyngeal erythema.   Cardiovascular:      Rate and Rhythm: Regular rhythm. Tachycardia present.      Pulses: Normal pulses.      Heart sounds: Normal heart sounds. No murmur heard.  Pulmonary:      Effort: Pulmonary effort is normal. No respiratory distress.      Breath sounds: Normal breath sounds.   Abdominal:      Tenderness: There is right CVA tenderness and left CVA tenderness.   Musculoskeletal:         General: No swelling or " tenderness. Normal range of motion.   Skin:     General: Skin is warm and dry.   Neurological:      General: No focal deficit present.      Mental Status: She is alert and oriented to person, place, and time.   Psychiatric:         Mood and Affect: Mood normal.         Laboratory:  Recent Labs     07/06/23  0125   WBC 20.4*   RBC 4.25   HEMOGLOBIN 12.8   HEMATOCRIT 38.7   MCV 91.1   MCH 30.1   MCHC 33.1   RDW 40.8   PLATELETCT 327   MPV 8.5*     Recent Labs     07/06/23  0125   SODIUM 137   POTASSIUM 3.8   CHLORIDE 101   CO2 22   GLUCOSE 142*   BUN 7*   CREATININE 0.89   CALCIUM 9.9     Recent Labs     07/06/23  0125   ALTSGPT 28   ASTSGOT 17   ALKPHOSPHAT 93   TBILIRUBIN 0.7   GLUCOSE 142*         No results for input(s): NTPROBNP in the last 72 hours.      No results for input(s): TROPONINT in the last 72 hours.    Imaging:  CT-ABDOMEN-PELVIS WITH   Final Result         1.  Striated enhancement pattern of the left kidney with slight adjacent hazy fat stranding and enhancement of the left ureter, appearance favoring changes of pyelonephritis.   2.  Hepatomegaly      DX-CHEST-PORTABLE (1 VIEW)   Final Result      No radiographic evidence of acute cardiopulmonary process with decrease in lung volumes.          X-Ray:  I have personally reviewed the images and compared with prior images. and My impression is: No acute process    Assessment/Plan:  Justification for Admission Status  I anticipate this patient will require at least two midnights for appropriate medical management, necessitating inpatient admission because pyelonephritis    * Pyelonephritis- (present on admission)  Assessment & Plan  Patient has had urinary symptoms and abdominal pain for the past week, began having flank pain bilaterally worse on the right over the past few days  Was prescribed Bactrim as outpatient which she began yesterday without any significant improvement and has continued fevers and pain  On IV ceftriaxone, IV fluids  CT abdomen  ordered in ED and pending     SIRS (systemic inflammatory response syndrome) (HCC)- (present on admission)  Assessment & Plan  SIRS criteria identified on my evaluation include:  Fever, with temperature greater than 100.9 deg F, Tachycardia, with heart rate greater than 90 BPM, Tachypnea, with respirations greater than 20 per minute and Leukocytosis, with WBC greater than 12,000  Secondary to pyelonephritis, no acute endorgan damage.  Normal lactic acid  CBC ordered continue monitoring WBC count    Hyperglycemia- (present on admission)  Assessment & Plan  Possible reaction due to acute infection.  Check A1c levels    Gastroesophageal reflux disease without esophagitis- (present on admission)  Assessment & Plan  Continue PPI        VTE prophylaxis: SCDs/TEDs

## 2023-07-06 NOTE — PROGRESS NOTES
Assumed care of patient at 0440 from Dinora ROBB. Patient is A&O x 4. Bed locked in the lowest position, 2 side rails up, call light is within reach, belongings at bedside. Hourly rounding is in place. Pt has no at this time.

## 2023-07-06 NOTE — PROGRESS NOTES
Med rec completed per patient at bedside with  Farrah ([id number]), and patient's pharmacies [facility] on [address] ([phone]) and [facility] on [address] ([phone]).  Allergies reviewed with patient. RENÉE.  On 6/26/2023 patient was prescribed a 5 day course of Macrobid, which she states that she finished.  On 7/3/2023 patient was prescribed a 5 day course of Bactrim DS; patient was unable to verify whether or not she had started this antibiotic at home.  Patient is on courses of ofloxacin and prednisolone eye drops prescribed on 6/6/2023. Patient states she is still using both of these eye drops.

## 2023-07-07 PROBLEM — R78.81 GRAM-NEGATIVE BACTEREMIA: Status: ACTIVE | Noted: 2023-07-07

## 2023-07-07 LAB
ANION GAP SERPL CALC-SCNC: 10 MMOL/L (ref 7–16)
BUN SERPL-MCNC: 8 MG/DL (ref 8–22)
CALCIUM SERPL-MCNC: 9.1 MG/DL (ref 8.5–10.5)
CHLORIDE SERPL-SCNC: 106 MMOL/L (ref 96–112)
CO2 SERPL-SCNC: 21 MMOL/L (ref 20–33)
CREAT SERPL-MCNC: 0.7 MG/DL (ref 0.5–1.4)
ERYTHROCYTE [DISTWIDTH] IN BLOOD BY AUTOMATED COUNT: 42.2 FL (ref 35.9–50)
GFR SERPLBLD CREATININE-BSD FMLA CKD-EPI: 108 ML/MIN/1.73 M 2
GLUCOSE SERPL-MCNC: 114 MG/DL (ref 65–99)
HCT VFR BLD AUTO: 31.1 % (ref 37–47)
HGB BLD-MCNC: 10.5 G/DL (ref 12–16)
MCH RBC QN AUTO: 30.3 PG (ref 27–33)
MCHC RBC AUTO-ENTMCNC: 33.8 G/DL (ref 32.2–35.5)
MCV RBC AUTO: 89.6 FL (ref 81.4–97.8)
PLATELET # BLD AUTO: 242 K/UL (ref 164–446)
PMV BLD AUTO: 8.6 FL (ref 9–12.9)
POTASSIUM SERPL-SCNC: 3.8 MMOL/L (ref 3.6–5.5)
RBC # BLD AUTO: 3.47 M/UL (ref 4.2–5.4)
SODIUM SERPL-SCNC: 137 MMOL/L (ref 135–145)
WBC # BLD AUTO: 10.6 K/UL (ref 4.8–10.8)

## 2023-07-07 PROCEDURE — 87040 BLOOD CULTURE FOR BACTERIA: CPT | Mod: 91

## 2023-07-07 PROCEDURE — 700102 HCHG RX REV CODE 250 W/ 637 OVERRIDE(OP): Performed by: FAMILY MEDICINE

## 2023-07-07 PROCEDURE — 36415 COLL VENOUS BLD VENIPUNCTURE: CPT

## 2023-07-07 PROCEDURE — 770006 HCHG ROOM/CARE - MED/SURG/GYN SEMI*

## 2023-07-07 PROCEDURE — 80048 BASIC METABOLIC PNL TOTAL CA: CPT

## 2023-07-07 PROCEDURE — A9270 NON-COVERED ITEM OR SERVICE: HCPCS | Performed by: STUDENT IN AN ORGANIZED HEALTH CARE EDUCATION/TRAINING PROGRAM

## 2023-07-07 PROCEDURE — 700102 HCHG RX REV CODE 250 W/ 637 OVERRIDE(OP): Performed by: STUDENT IN AN ORGANIZED HEALTH CARE EDUCATION/TRAINING PROGRAM

## 2023-07-07 PROCEDURE — 99232 SBSQ HOSP IP/OBS MODERATE 35: CPT | Performed by: FAMILY MEDICINE

## 2023-07-07 PROCEDURE — 700111 HCHG RX REV CODE 636 W/ 250 OVERRIDE (IP): Performed by: STUDENT IN AN ORGANIZED HEALTH CARE EDUCATION/TRAINING PROGRAM

## 2023-07-07 PROCEDURE — 85027 COMPLETE CBC AUTOMATED: CPT

## 2023-07-07 PROCEDURE — A9270 NON-COVERED ITEM OR SERVICE: HCPCS | Performed by: FAMILY MEDICINE

## 2023-07-07 PROCEDURE — 700105 HCHG RX REV CODE 258: Performed by: FAMILY MEDICINE

## 2023-07-07 RX ADMIN — OMEPRAZOLE 20 MG: 20 CAPSULE, DELAYED RELEASE ORAL at 04:30

## 2023-07-07 RX ADMIN — BUTALBITAL, ACETAMINOPHEN AND CAFFEINE 1 TABLET: 325; 50; 40 TABLET ORAL at 23:10

## 2023-07-07 RX ADMIN — OXYCODONE HYDROCHLORIDE 10 MG: 5 TABLET ORAL at 17:52

## 2023-07-07 RX ADMIN — ACETAMINOPHEN 650 MG: 325 TABLET, FILM COATED ORAL at 11:31

## 2023-07-07 RX ADMIN — CEFTRIAXONE SODIUM 2000 MG: 10 INJECTION, POWDER, FOR SOLUTION INTRAVENOUS at 04:30

## 2023-07-07 RX ADMIN — SODIUM CHLORIDE: 9 INJECTION, SOLUTION INTRAVENOUS at 01:41

## 2023-07-07 RX ADMIN — ACETAMINOPHEN 650 MG: 325 TABLET, FILM COATED ORAL at 04:30

## 2023-07-07 RX ADMIN — SENNOSIDES AND DOCUSATE SODIUM 2 TABLET: 50; 8.6 TABLET ORAL at 17:36

## 2023-07-07 RX ADMIN — SODIUM CHLORIDE: 9 INJECTION, SOLUTION INTRAVENOUS at 23:49

## 2023-07-07 RX ADMIN — BUTALBITAL, ACETAMINOPHEN AND CAFFEINE 1 TABLET: 325; 50; 40 TABLET ORAL at 02:34

## 2023-07-07 RX ADMIN — SENNOSIDES AND DOCUSATE SODIUM 2 TABLET: 50; 8.6 TABLET ORAL at 04:30

## 2023-07-07 RX ADMIN — BUTALBITAL, ACETAMINOPHEN AND CAFFEINE 1 TABLET: 325; 50; 40 TABLET ORAL at 12:08

## 2023-07-07 RX ADMIN — OXYCODONE HYDROCHLORIDE 10 MG: 5 TABLET ORAL at 09:12

## 2023-07-07 RX ADMIN — SODIUM CHLORIDE: 9 INJECTION, SOLUTION INTRAVENOUS at 12:21

## 2023-07-07 ASSESSMENT — ENCOUNTER SYMPTOMS
DIZZINESS: 0
WEAKNESS: 1
SHORTNESS OF BREATH: 0
BACK PAIN: 0
NAUSEA: 0
MYALGIAS: 0
HEARTBURN: 0
HEADACHES: 1
SENSORY CHANGE: 0
SPEECH CHANGE: 0
DIARRHEA: 0
FEVER: 0
FLANK PAIN: 0
PALPITATIONS: 0
WHEEZING: 0
COUGH: 0
BLURRED VISION: 0
VOMITING: 0
DIAPHORESIS: 0
NERVOUS/ANXIOUS: 0
FOCAL WEAKNESS: 0
NECK PAIN: 0
CHILLS: 0
SORE THROAT: 0
ABDOMINAL PAIN: 0

## 2023-07-07 ASSESSMENT — PAIN DESCRIPTION - PAIN TYPE
TYPE: ACUTE PAIN

## 2023-07-07 ASSESSMENT — PAIN SCALES - WONG BAKER
WONGBAKER_NUMERICALRESPONSE: HURTS JUST A LITTLE BIT
WONGBAKER_NUMERICALRESPONSE: HURTS JUST A LITTLE BIT

## 2023-07-07 NOTE — PROGRESS NOTES
Assumed care of patient this AM. Patient states that she had a few bites of applesauce but other than that has not eaten any solid foods for over 24 hrs. Patient educated to not eat or drink from here forward. Transport to come around 1230. Report given to preop nurse.

## 2023-07-07 NOTE — CARE PLAN
The patient is Watcher - Medium risk of patient condition declining or worsening    Shift Goals  Clinical Goals: Pt will feel minimal abdominal discomfort by EOS  Patient Goals: comfort    Progress made toward(s) clinical / shift goals:  Patient states that pain medications and rest have been helpful.     Patient is not progressing towards the following goals: N/A    Problem: Pain - Standard  Goal: Alleviation of pain or a reduction in pain to the patient’s comfort goal  Outcome: Progressing   Headache and abdominal pain has been reduced with the use of PRN pain meds. See MAR.   Problem: Knowledge Deficit - Standard  Goal: Patient and family/care givers will demonstrate understanding of plan of care, disease process/condition, diagnostic tests and medications  Outcome: Progressing   Patient educated and acknowledge care plan   Problem: Hemodynamics  Goal: Patient's hemodynamics, fluid balance and neurologic status will be stable or improve  Outcome: Progressing   A/Ox4, on IV fluids   Problem: Fluid Volume  Goal: Fluid volume balance will be maintained  Outcome: Progressing   On continuous IV fluids   Problem: Urinary - Renal Perfusion  Goal: Ability to achieve and maintain adequate renal perfusion and functioning will improve  Outcome: Progressing   Fluid hydration and adequate urination

## 2023-07-07 NOTE — PROGRESS NOTES
Chapis from [facility] called at 2100 with a new result for this pt. Pt has gram negative zach bacteria in her blood culture.  was notified.

## 2023-07-07 NOTE — PROGRESS NOTES
Hospital Medicine Daily Progress Note    Date of Service  7/7/2023    Chief Complaint  Amada Wilson is a 48 y.o. female admitted 7/6/2023 with pyelonephritis    Hospital Course  Admitted with pyelonephritis, started empiric coverage with IV Rocephin.    Interval Problem Update  Pyelonephritis -urine culture shows E. coli, sensitivities pending  Bacteremia -gram-negative rods    Updates given and plan of care discussed with patient's spouse who was at bedside.    A qualified  was used to interpret Serbian during this encounter.  ’s name/ID number was [id number] and mode of interpretation was iPad.     I have discussed this patient's plan of care and discharge plan at IDT rounds today with Case Management, Nursing, Nursing leadership, and other members of the IDT team.    Consultants/Specialty  None    Code Status  Full Code    Disposition  The patient is not medically cleared for discharge to home or a post-acute facility.  Anticipate discharge to: home with close outpatient follow-up    I have placed the appropriate orders for post-discharge needs.    Review of Systems  Review of Systems   Constitutional:  Positive for malaise/fatigue. Negative for chills, diaphoresis and fever.   HENT:  Negative for congestion, hearing loss and sore throat.    Eyes:  Negative for blurred vision.   Respiratory:  Negative for cough, shortness of breath and wheezing.    Cardiovascular:  Negative for chest pain, palpitations and leg swelling.   Gastrointestinal:  Negative for abdominal pain, diarrhea, heartburn, nausea and vomiting.   Genitourinary:  Negative for dysuria, flank pain and hematuria.   Musculoskeletal:  Negative for back pain, joint pain, myalgias and neck pain.   Skin:  Negative for rash.   Neurological:  Positive for weakness and headaches. Negative for dizziness, sensory change, speech change and focal weakness.   Psychiatric/Behavioral:  The patient is not nervous/anxious.          Physical Exam  Temp:  [36.4 °C (97.5 °F)-37.8 °C (100.1 °F)] 36.4 °C (97.6 °F)  Pulse:  [] 95  Resp:  [17-20] 18  BP: (103-132)/(66-81) 132/81  SpO2:  [96 %-98 %] 98 %    Physical Exam  Vitals and nursing note reviewed.   HENT:      Head: Normocephalic and atraumatic.      Nose: No congestion.   Eyes:      Extraocular Movements: Extraocular movements intact.      Conjunctiva/sclera: Conjunctivae normal.   Cardiovascular:      Rate and Rhythm: Normal rate and regular rhythm.   Pulmonary:      Effort: Pulmonary effort is normal.      Breath sounds: Normal breath sounds.   Abdominal:      General: There is no distension.      Tenderness: There is no abdominal tenderness. There is no right CVA tenderness, left CVA tenderness or guarding.   Musculoskeletal:      Cervical back: No tenderness.      Right lower leg: No edema.      Left lower leg: No edema.   Skin:     General: Skin is warm and dry.   Neurological:      General: No focal deficit present.      Mental Status: She is alert and oriented to person, place, and time.      Cranial Nerves: No cranial nerve deficit.         Fluids    Intake/Output Summary (Last 24 hours) at 7/7/2023 1335  Last data filed at 7/7/2023 0600  Gross per 24 hour   Intake 2333.23 ml   Output --   Net 2333.23 ml       Laboratory  Recent Labs     07/06/23  0125 07/07/23  0336   WBC 20.4* 10.6   RBC 4.25 3.47*   HEMOGLOBIN 12.8 10.5*   HEMATOCRIT 38.7 31.1*   MCV 91.1 89.6   MCH 30.1 30.3   MCHC 33.1 33.8   RDW 40.8 42.2   PLATELETCT 327 242   MPV 8.5* 8.6*     Recent Labs     07/06/23  0125 07/07/23  0336   SODIUM 137 137   POTASSIUM 3.8 3.8   CHLORIDE 101 106   CO2 22 21   GLUCOSE 142* 114*   BUN 7* 8   CREATININE 0.89 0.70   CALCIUM 9.9 9.1                   Imaging  CT-ABDOMEN-PELVIS WITH   Final Result         1.  Striated enhancement pattern of the left kidney with slight adjacent hazy fat stranding and enhancement of the left ureter, appearance favoring changes of  pyelonephritis.   2.  Hepatomegaly      DX-CHEST-PORTABLE (1 VIEW)   Final Result      No radiographic evidence of acute cardiopulmonary process with decrease in lung volumes.           Assessment/Plan  * Pyelonephritis- (present on admission)  Assessment & Plan  IV Rocephin  Follow cultures    Gram-negative bacteremia- (present on admission)  Assessment & Plan  IV Rocephin  Repeat blood cultures today  Follow cultures    SIRS (systemic inflammatory response syndrome) (HCC)- (present on admission)  Assessment & Plan  SIRS criteria identified on my evaluation include:  Fever, with temperature greater than 100.9 deg F, Tachycardia, with heart rate greater than 90 BPM, Tachypnea, with respirations greater than 20 per minute and Leukocytosis, with WBC greater than 12,000    Prediabetes- (present on admission)  Assessment & Plan  Diabetic diet    Gastroesophageal reflux disease without esophagitis- (present on admission)  Assessment & Plan  Omeprazole         VTE prophylaxis: SCDs/TEDs    I have performed a physical exam and reviewed and updated ROS and Plan today (7/7/2023). In review of yesterday's note (7/6/2023), there are no changes except as documented above.

## 2023-07-07 NOTE — CARE PLAN
The patient is Watcher - Medium risk of patient condition declining or worsening    Shift Goals  Clinical Goals: pt will settle in and get IV fluids  Patient Goals: comfort    Progress made toward(s) clinical / shift goals:  Pt is receiving medications per MAR. Pt has received IV fluids this shift. Pt was educated on her gram negative bacteria result -pt receptive to education via interpretor.    Problem: Pain - Standard  Goal: Alleviation of pain or a reduction in pain to the patient’s comfort goal  Outcome: Progressing     Problem: Knowledge Deficit - Standard  Goal: Patient and family/care givers will demonstrate understanding of plan of care, disease process/condition, diagnostic tests and medications  Outcome: Progressing     Patient is not progressing towards the following goals:

## 2023-07-07 NOTE — PROGRESS NOTES
Assumed care of patient at 1900 from Ester ROBB. Patient is A&O x 4. Bed locked in the lowest position, 2 side rails up, call light is within reach, belongings at bedside. Hourly rounding is in place. Pt has no at this time. Pt is on septic watch

## 2023-07-07 NOTE — PROGRESS NOTES
Conformed with Maryse Guevara from pre -op that patient's cataract surgery is canceled today. Patient informed.

## 2023-07-08 PROBLEM — E87.6 HYPOKALEMIA: Status: ACTIVE | Noted: 2023-07-08

## 2023-07-08 LAB
ANION GAP SERPL CALC-SCNC: 11 MMOL/L (ref 7–16)
BACTERIA BLD CULT: ABNORMAL
BACTERIA UR CULT: ABNORMAL
BACTERIA UR CULT: ABNORMAL
BUN SERPL-MCNC: 7 MG/DL (ref 8–22)
CALCIUM SERPL-MCNC: 8.8 MG/DL (ref 8.5–10.5)
CHLORIDE SERPL-SCNC: 103 MMOL/L (ref 96–112)
CO2 SERPL-SCNC: 20 MMOL/L (ref 20–33)
CREAT SERPL-MCNC: 0.7 MG/DL (ref 0.5–1.4)
ERYTHROCYTE [DISTWIDTH] IN BLOOD BY AUTOMATED COUNT: 42.3 FL (ref 35.9–50)
GFR SERPLBLD CREATININE-BSD FMLA CKD-EPI: 108 ML/MIN/1.73 M 2
GLUCOSE SERPL-MCNC: 128 MG/DL (ref 65–99)
HCT VFR BLD AUTO: 29.4 % (ref 37–47)
HGB BLD-MCNC: 9.8 G/DL (ref 12–16)
MCH RBC QN AUTO: 30.3 PG (ref 27–33)
MCHC RBC AUTO-ENTMCNC: 33.3 G/DL (ref 32.2–35.5)
MCV RBC AUTO: 91 FL (ref 81.4–97.8)
PLATELET # BLD AUTO: 229 K/UL (ref 164–446)
PMV BLD AUTO: 8.8 FL (ref 9–12.9)
POTASSIUM SERPL-SCNC: 3.5 MMOL/L (ref 3.6–5.5)
RBC # BLD AUTO: 3.23 M/UL (ref 4.2–5.4)
SIGNIFICANT IND 70042: ABNORMAL
SITE SITE: ABNORMAL
SODIUM SERPL-SCNC: 134 MMOL/L (ref 135–145)
SOURCE SOURCE: ABNORMAL
WBC # BLD AUTO: 5.1 K/UL (ref 4.8–10.8)

## 2023-07-08 PROCEDURE — 770006 HCHG ROOM/CARE - MED/SURG/GYN SEMI*

## 2023-07-08 PROCEDURE — A9270 NON-COVERED ITEM OR SERVICE: HCPCS | Mod: JZ | Performed by: FAMILY MEDICINE

## 2023-07-08 PROCEDURE — 700105 HCHG RX REV CODE 258: Mod: JZ | Performed by: OPHTHALMOLOGY

## 2023-07-08 PROCEDURE — 700102 HCHG RX REV CODE 250 W/ 637 OVERRIDE(OP): Mod: JZ | Performed by: FAMILY MEDICINE

## 2023-07-08 PROCEDURE — 36415 COLL VENOUS BLD VENIPUNCTURE: CPT

## 2023-07-08 PROCEDURE — 85027 COMPLETE CBC AUTOMATED: CPT

## 2023-07-08 PROCEDURE — 700111 HCHG RX REV CODE 636 W/ 250 OVERRIDE (IP): Performed by: STUDENT IN AN ORGANIZED HEALTH CARE EDUCATION/TRAINING PROGRAM

## 2023-07-08 PROCEDURE — 80048 BASIC METABOLIC PNL TOTAL CA: CPT

## 2023-07-08 PROCEDURE — 700102 HCHG RX REV CODE 250 W/ 637 OVERRIDE(OP): Performed by: STUDENT IN AN ORGANIZED HEALTH CARE EDUCATION/TRAINING PROGRAM

## 2023-07-08 PROCEDURE — 99232 SBSQ HOSP IP/OBS MODERATE 35: CPT | Performed by: FAMILY MEDICINE

## 2023-07-08 PROCEDURE — A9270 NON-COVERED ITEM OR SERVICE: HCPCS | Performed by: STUDENT IN AN ORGANIZED HEALTH CARE EDUCATION/TRAINING PROGRAM

## 2023-07-08 RX ORDER — POTASSIUM CHLORIDE 20 MEQ/1
20 TABLET, EXTENDED RELEASE ORAL DAILY
Status: DISCONTINUED | OUTPATIENT
Start: 2023-07-08 | End: 2023-07-09 | Stop reason: HOSPADM

## 2023-07-08 RX ORDER — MOXIFLOXACIN 5 MG/ML
1 SOLUTION/ DROPS OPHTHALMIC
Status: DISCONTINUED | OUTPATIENT
Start: 2023-07-08 | End: 2023-07-09 | Stop reason: HOSPADM

## 2023-07-08 RX ORDER — FLURBIPROFEN SODIUM 0.3 MG/ML
1 SOLUTION/ DROPS OPHTHALMIC
Status: DISCONTINUED | OUTPATIENT
Start: 2023-07-08 | End: 2023-07-09 | Stop reason: HOSPADM

## 2023-07-08 RX ORDER — CYCLOPENTOLATE HYDROCHLORIDE 10 MG/ML
1 SOLUTION/ DROPS OPHTHALMIC
Status: DISCONTINUED | OUTPATIENT
Start: 2023-07-08 | End: 2023-07-09 | Stop reason: HOSPADM

## 2023-07-08 RX ORDER — PHENYLEPHRINE HYDROCHLORIDE 25 MG/ML
1 SOLUTION/ DROPS OPHTHALMIC
Status: DISCONTINUED | OUTPATIENT
Start: 2023-07-08 | End: 2023-07-09 | Stop reason: HOSPADM

## 2023-07-08 RX ORDER — TROPICAMIDE 10 MG/ML
1 SOLUTION/ DROPS OPHTHALMIC
Status: DISCONTINUED | OUTPATIENT
Start: 2023-07-08 | End: 2023-07-09 | Stop reason: HOSPADM

## 2023-07-08 RX ORDER — SODIUM CHLORIDE, SODIUM LACTATE, POTASSIUM CHLORIDE, CALCIUM CHLORIDE 600; 310; 30; 20 MG/100ML; MG/100ML; MG/100ML; MG/100ML
INJECTION, SOLUTION INTRAVENOUS CONTINUOUS
Status: ACTIVE | OUTPATIENT
Start: 2023-07-08 | End: 2023-07-09

## 2023-07-08 RX ADMIN — SENNOSIDES AND DOCUSATE SODIUM 2 TABLET: 50; 8.6 TABLET ORAL at 16:50

## 2023-07-08 RX ADMIN — SODIUM CHLORIDE, POTASSIUM CHLORIDE, SODIUM LACTATE AND CALCIUM CHLORIDE: 600; 310; 30; 20 INJECTION, SOLUTION INTRAVENOUS at 21:38

## 2023-07-08 RX ADMIN — BUTALBITAL, ACETAMINOPHEN AND CAFFEINE 1 TABLET: 325; 50; 40 TABLET ORAL at 05:37

## 2023-07-08 RX ADMIN — BUTALBITAL, ACETAMINOPHEN AND CAFFEINE 1 TABLET: 325; 50; 40 TABLET ORAL at 21:35

## 2023-07-08 RX ADMIN — SENNOSIDES AND DOCUSATE SODIUM 2 TABLET: 50; 8.6 TABLET ORAL at 05:29

## 2023-07-08 RX ADMIN — POTASSIUM CHLORIDE 20 MEQ: 1500 TABLET, EXTENDED RELEASE ORAL at 08:42

## 2023-07-08 RX ADMIN — OMEPRAZOLE 20 MG: 20 CAPSULE, DELAYED RELEASE ORAL at 05:29

## 2023-07-08 RX ADMIN — CEFTRIAXONE SODIUM 2000 MG: 10 INJECTION, POWDER, FOR SOLUTION INTRAVENOUS at 05:29

## 2023-07-08 ASSESSMENT — ENCOUNTER SYMPTOMS
NECK PAIN: 0
PALPITATIONS: 0
COUGH: 0
BLURRED VISION: 0
SPEECH CHANGE: 0
ABDOMINAL PAIN: 0
MYALGIAS: 0
NERVOUS/ANXIOUS: 0
SHORTNESS OF BREATH: 0
SORE THROAT: 0
WEAKNESS: 1
NAUSEA: 0
DIZZINESS: 0
BACK PAIN: 0
SENSORY CHANGE: 0
DIAPHORESIS: 0
FOCAL WEAKNESS: 0
VOMITING: 0
HEADACHES: 0
FLANK PAIN: 0
DIARRHEA: 0
HEARTBURN: 0
CHILLS: 0
FEVER: 0
WHEEZING: 0

## 2023-07-08 ASSESSMENT — PATIENT HEALTH QUESTIONNAIRE - PHQ9
SUM OF ALL RESPONSES TO PHQ9 QUESTIONS 1 AND 2: 0
1. LITTLE INTEREST OR PLEASURE IN DOING THINGS: NOT AT ALL
2. FEELING DOWN, DEPRESSED, IRRITABLE, OR HOPELESS: NOT AT ALL

## 2023-07-08 ASSESSMENT — PAIN DESCRIPTION - PAIN TYPE
TYPE: ACUTE PAIN
TYPE: ACUTE PAIN

## 2023-07-08 NOTE — CARE PLAN
The patient is Stable - Low risk of patient condition declining or worsening    Shift Goals  Clinical Goals: iv fluids, iv abx  Patient Goals: comfort    Progress made toward(s) clinical / shift goals:        Problem: Pain - Standard  Goal: Alleviation of pain or a reduction in pain to the patient’s comfort goal  Outcome: Progressing     Problem: Knowledge Deficit - Standard  Goal: Patient and family/care givers will demonstrate understanding of plan of care, disease process/condition, diagnostic tests and medications  Outcome: Progressing     Problem: Hemodynamics  Goal: Patient's hemodynamics, fluid balance and neurologic status will be stable or improve  Outcome: Progressing     Problem: Fluid Volume  Goal: Fluid volume balance will be maintained  Outcome: Progressing     Problem: Urinary - Renal Perfusion  Goal: Ability to achieve and maintain adequate renal perfusion and functioning will improve  Outcome: Progressing       Patient is not progressing towards the following goals:

## 2023-07-08 NOTE — CARE PLAN
The patient is Stable - Low risk of patient condition declining or worsening    Shift Goals  Clinical Goals: pt will understand her diagnosis better by end of shift  Patient Goals: to sleep without headache pain    Progress made toward(s) clinical / shift goals:     Problem: Pain - Standard  Goal: Alleviation of pain or a reduction in pain to the patient’s comfort goal  7/8/2023 0156 by Jarod Herrera RMontseN.  Outcome: Progressing  Note: Pt was able to sleep comfortably with medication this shift.  7/8/2023 0123 by Jarod Herrera RMontseN.  Outcome: Progressing     Problem: Knowledge Deficit - Standard  Goal: Patient and family/care givers will demonstrate understanding of plan of care, disease process/condition, diagnostic tests and medications  7/8/2023 0156 by Jarod Herrera R.N.  Outcome: Progressing  Note: Explained to pt about her diagnosis and plan of care through an . Pt was accepting.  7/8/2023 0123 by Jarod Herrera R.N.  Outcome: Progressing     Patient is not progressing towards the following goals:

## 2023-07-08 NOTE — PROGRESS NOTES
Hospital Medicine Daily Progress Note    Date of Service  7/8/2023    Chief Complaint  Amada Wilson is a 48 y.o. female admitted 7/6/2023 with pyelonephritis    Hospital Course  Admitted with pyelonephritis, started on empiric coverage with IV Rocephin.  Also noted to have gram-negative bacteremia.    Interval Problem Update  Pyelonephritis - culture shows E. coli, sensitivities reviewed  Bacteremia - culture shows E. coli, sensitivities reviewed, repeat cultures negative so far  Low potassium    A qualified  was used to interpret [place] during this encounter.  ’s name/ID number was [id number] and mode of interpretation was iPad.     I have discussed this patient's plan of care and discharge plan at IDT rounds today with Case Management, Nursing, Nursing leadership, and other members of the IDT team.    Consultants/Specialty  None    Code Status  Full Code    Disposition  The patient is not medically cleared for discharge to home or a post-acute facility.  Anticipate discharge to: home with close outpatient follow-up    I have placed the appropriate orders for post-discharge needs.    Review of Systems  Review of Systems   Constitutional:  Positive for malaise/fatigue. Negative for chills, diaphoresis and fever.   HENT:  Negative for congestion, hearing loss and sore throat.    Eyes:  Negative for blurred vision.   Respiratory:  Negative for cough, shortness of breath and wheezing.    Cardiovascular:  Negative for chest pain, palpitations and leg swelling.   Gastrointestinal:  Negative for abdominal pain, diarrhea, heartburn, nausea and vomiting.   Genitourinary:  Negative for dysuria, flank pain and hematuria.   Musculoskeletal:  Negative for back pain, joint pain, myalgias and neck pain.   Skin:  Negative for rash.   Neurological:  Positive for weakness. Negative for dizziness, sensory change, speech change, focal weakness and headaches.   Psychiatric/Behavioral:  The  patient is not nervous/anxious.         Physical Exam  Temp:  [36.2 °C (97.1 °F)-36.5 °C (97.7 °F)] 36.4 °C (97.5 °F)  Pulse:  [80-93] 84  Resp:  [18] 18  BP: (109-126)/(65-88) 126/88  SpO2:  [93 %-99 %] 96 %    Physical Exam  Vitals and nursing note reviewed.   HENT:      Head: Normocephalic and atraumatic.      Nose: No congestion.   Eyes:      Extraocular Movements: Extraocular movements intact.      Conjunctiva/sclera: Conjunctivae normal.   Cardiovascular:      Rate and Rhythm: Normal rate and regular rhythm.   Pulmonary:      Effort: Pulmonary effort is normal.      Breath sounds: Normal breath sounds.   Abdominal:      General: There is no distension.      Tenderness: There is no abdominal tenderness. There is no right CVA tenderness, left CVA tenderness or guarding.   Musculoskeletal:      Cervical back: No tenderness.      Right lower leg: No edema.      Left lower leg: No edema.   Skin:     General: Skin is warm and dry.   Neurological:      General: No focal deficit present.      Mental Status: She is alert and oriented to person, place, and time.      Cranial Nerves: No cranial nerve deficit.         Fluids    Intake/Output Summary (Last 24 hours) at 7/8/2023 1444  Last data filed at 7/8/2023 1000  Gross per 24 hour   Intake 2667.63 ml   Output --   Net 2667.63 ml       Laboratory  Recent Labs     07/06/23  0125 07/07/23 0336 07/08/23  0328   WBC 20.4* 10.6 5.1   RBC 4.25 3.47* 3.23*   HEMOGLOBIN 12.8 10.5* 9.8*   HEMATOCRIT 38.7 31.1* 29.4*   MCV 91.1 89.6 91.0   MCH 30.1 30.3 30.3   MCHC 33.1 33.8 33.3   RDW 40.8 42.2 42.3   PLATELETCT 327 242 229   MPV 8.5* 8.6* 8.8*     Recent Labs     07/06/23  0125 07/07/23  0336 07/08/23  0328   SODIUM 137 137 134*   POTASSIUM 3.8 3.8 3.5*   CHLORIDE 101 106 103   CO2 22 21 20   GLUCOSE 142* 114* 128*   BUN 7* 8 7*   CREATININE 0.89 0.70 0.70   CALCIUM 9.9 9.1 8.8                   Imaging  CT-ABDOMEN-PELVIS WITH   Final Result         1.  Striated enhancement  pattern of the left kidney with slight adjacent hazy fat stranding and enhancement of the left ureter, appearance favoring changes of pyelonephritis.   2.  Hepatomegaly      DX-CHEST-PORTABLE (1 VIEW)   Final Result      No radiographic evidence of acute cardiopulmonary process with decrease in lung volumes.           Assessment/Plan  * Pyelonephritis- (present on admission)  Assessment & Plan  IV Rocephin    Gram-negative bacteremia- (present on admission)  Assessment & Plan  IV Rocephin  Follow repeat blood cultures     Hypokalemia- (present on admission)  Assessment & Plan  Start Kdur, follow bmp    SIRS (systemic inflammatory response syndrome) (HCC)- (present on admission)  Assessment & Plan  SIRS criteria identified on my evaluation include:  Fever, with temperature greater than 100.9 deg F, Tachycardia, with heart rate greater than 90 BPM, Tachypnea, with respirations greater than 20 per minute and Leukocytosis, with WBC greater than 12,000    Prediabetes- (present on admission)  Assessment & Plan  hgba1c 5.7  Diabetic diet  Consider Metformin on discharge    Gastroesophageal reflux disease without esophagitis- (present on admission)  Assessment & Plan  Omeprazole         VTE prophylaxis: SCDs/TEDs    I have performed a physical exam and reviewed and updated ROS and Plan today (7/8/2023). In review of yesterday's note (7/7/2023), there are no changes except as documented above.

## 2023-07-08 NOTE — PROGRESS NOTES
Assumed care of patient at 1900 from Jenni ROBB. Patient is A&O x 4, states pain level is 5 /10.Bed locked in the lowest position, 2 side rails up, Call light within reach, belongings at bedside. Patient expresses no needs at this time and hourly rounding is in place.

## 2023-07-09 ENCOUNTER — PHARMACY VISIT (OUTPATIENT)
Dept: PHARMACY | Facility: MEDICAL CENTER | Age: 48
End: 2023-07-09
Payer: MEDICARE

## 2023-07-09 VITALS
HEIGHT: 63 IN | TEMPERATURE: 97.2 F | HEART RATE: 75 BPM | BODY MASS INDEX: 29.77 KG/M2 | WEIGHT: 168 LBS | SYSTOLIC BLOOD PRESSURE: 137 MMHG | DIASTOLIC BLOOD PRESSURE: 91 MMHG | RESPIRATION RATE: 18 BRPM | OXYGEN SATURATION: 97 %

## 2023-07-09 LAB
ANION GAP SERPL CALC-SCNC: 13 MMOL/L (ref 7–16)
BUN SERPL-MCNC: 12 MG/DL (ref 8–22)
CALCIUM SERPL-MCNC: 9.3 MG/DL (ref 8.5–10.5)
CHLORIDE SERPL-SCNC: 104 MMOL/L (ref 96–112)
CO2 SERPL-SCNC: 19 MMOL/L (ref 20–33)
CREAT SERPL-MCNC: 0.8 MG/DL (ref 0.5–1.4)
ERYTHROCYTE [DISTWIDTH] IN BLOOD BY AUTOMATED COUNT: 42.5 FL (ref 35.9–50)
GFR SERPLBLD CREATININE-BSD FMLA CKD-EPI: 92 ML/MIN/1.73 M 2
GLUCOSE SERPL-MCNC: 90 MG/DL (ref 65–99)
HCT VFR BLD AUTO: 31.2 % (ref 37–47)
HGB BLD-MCNC: 10 G/DL (ref 12–16)
MAGNESIUM SERPL-MCNC: 2.2 MG/DL (ref 1.5–2.5)
MCH RBC QN AUTO: 29.6 PG (ref 27–33)
MCHC RBC AUTO-ENTMCNC: 32.1 G/DL (ref 32.2–35.5)
MCV RBC AUTO: 92.3 FL (ref 81.4–97.8)
PHOSPHATE SERPL-MCNC: 3.1 MG/DL (ref 2.5–4.5)
PLATELET # BLD AUTO: 268 K/UL (ref 164–446)
PMV BLD AUTO: 8.8 FL (ref 9–12.9)
POTASSIUM SERPL-SCNC: 3.6 MMOL/L (ref 3.6–5.5)
RBC # BLD AUTO: 3.38 M/UL (ref 4.2–5.4)
SODIUM SERPL-SCNC: 136 MMOL/L (ref 135–145)
WBC # BLD AUTO: 5.2 K/UL (ref 4.8–10.8)

## 2023-07-09 PROCEDURE — 80048 BASIC METABOLIC PNL TOTAL CA: CPT

## 2023-07-09 PROCEDURE — 84100 ASSAY OF PHOSPHORUS: CPT

## 2023-07-09 PROCEDURE — 700102 HCHG RX REV CODE 250 W/ 637 OVERRIDE(OP): Mod: JZ | Performed by: FAMILY MEDICINE

## 2023-07-09 PROCEDURE — A9270 NON-COVERED ITEM OR SERVICE: HCPCS | Mod: JZ | Performed by: FAMILY MEDICINE

## 2023-07-09 PROCEDURE — 99239 HOSP IP/OBS DSCHRG MGMT >30: CPT | Performed by: INTERNAL MEDICINE

## 2023-07-09 PROCEDURE — RXMED WILLOW AMBULATORY MEDICATION CHARGE: Performed by: INTERNAL MEDICINE

## 2023-07-09 PROCEDURE — 700101 HCHG RX REV CODE 250: Performed by: STUDENT IN AN ORGANIZED HEALTH CARE EDUCATION/TRAINING PROGRAM

## 2023-07-09 PROCEDURE — 36415 COLL VENOUS BLD VENIPUNCTURE: CPT

## 2023-07-09 PROCEDURE — 700111 HCHG RX REV CODE 636 W/ 250 OVERRIDE (IP): Performed by: STUDENT IN AN ORGANIZED HEALTH CARE EDUCATION/TRAINING PROGRAM

## 2023-07-09 PROCEDURE — 85027 COMPLETE CBC AUTOMATED: CPT

## 2023-07-09 PROCEDURE — 700105 HCHG RX REV CODE 258: Performed by: FAMILY MEDICINE

## 2023-07-09 PROCEDURE — A9270 NON-COVERED ITEM OR SERVICE: HCPCS | Performed by: STUDENT IN AN ORGANIZED HEALTH CARE EDUCATION/TRAINING PROGRAM

## 2023-07-09 PROCEDURE — 83735 ASSAY OF MAGNESIUM: CPT

## 2023-07-09 PROCEDURE — 700102 HCHG RX REV CODE 250 W/ 637 OVERRIDE(OP): Performed by: STUDENT IN AN ORGANIZED HEALTH CARE EDUCATION/TRAINING PROGRAM

## 2023-07-09 RX ORDER — BUTALBITAL, ACETAMINOPHEN AND CAFFEINE 50; 325; 40 MG/1; MG/1; MG/1
1 TABLET ORAL EVERY 6 HOURS PRN
Qty: 5 TABLET | Refills: 0 | Status: SHIPPED | OUTPATIENT
Start: 2023-07-09 | End: 2023-07-12

## 2023-07-09 RX ORDER — CIPROFLOXACIN 500 MG/1
500 TABLET, FILM COATED ORAL 2 TIMES DAILY
Qty: 8 TABLET | Refills: 0 | Status: ACTIVE | OUTPATIENT
Start: 2023-07-09 | End: 2023-07-13

## 2023-07-09 RX ADMIN — CEFTRIAXONE SODIUM 2000 MG: 10 INJECTION, POWDER, FOR SOLUTION INTRAVENOUS at 06:50

## 2023-07-09 RX ADMIN — SODIUM CHLORIDE: 9 INJECTION, SOLUTION INTRAVENOUS at 06:55

## 2023-07-09 RX ADMIN — OMEPRAZOLE 20 MG: 20 CAPSULE, DELAYED RELEASE ORAL at 06:50

## 2023-07-09 RX ADMIN — POTASSIUM CHLORIDE 20 MEQ: 1500 TABLET, EXTENDED RELEASE ORAL at 06:50

## 2023-07-09 RX ADMIN — SENNOSIDES AND DOCUSATE SODIUM 2 TABLET: 50; 8.6 TABLET ORAL at 06:50

## 2023-07-09 RX ADMIN — BUTALBITAL, ACETAMINOPHEN AND CAFFEINE 1 TABLET: 325; 50; 40 TABLET ORAL at 07:37

## 2023-07-09 NOTE — PROGRESS NOTES
Went over all discharge instructions with pt using a human . Pt voiced understanding. I answered all questions. I removed her IV and ATA Zambrano wheeled her downstairs to her . All personal belongs along with her work excuse in hand.

## 2023-07-09 NOTE — CARE PLAN
Problem: Pain - Standard  Goal: Alleviation of pain or a reduction in pain to the patient’s comfort goal  Outcome: Progressing     Problem: Knowledge Deficit - Standard  Goal: Patient and family/care givers will demonstrate understanding of plan of care, disease process/condition, diagnostic tests and medications  Outcome: Progressing     Problem: Hemodynamics  Goal: Patient's hemodynamics, fluid balance and neurologic status will be stable or improve  Outcome: Progressing     Problem: Fluid Volume  Goal: Fluid volume balance will be maintained  Outcome: Progressing     Problem: Urinary - Renal Perfusion  Goal: Ability to achieve and maintain adequate renal perfusion and functioning will improve  Outcome: Progressing   The patient is Stable - Low risk of patient condition declining or worsening    Shift Goals  Clinical Goals: Pain management, IV fluids  Patient Goals: Sleep, comfort    Progress made toward(s) clinical / shift goals:  Patient resting in bed, bed is locked and in lowest position, call bell within reach of patient.      Patient is not progressing towards the following goals:

## 2023-07-09 NOTE — DISCHARGE SUMMARY
Discharge Summary    CHIEF COMPLAINT ON ADMISSION  Chief Complaint   Patient presents with    UTI     Patient reports having UTI symptoms x1 week, was seen at the clinic given antibiotics but pain has worsened. Reports flank pain, fever, dysuria, n/v.        Reason for Admission  flank pain     Admission Date  7/6/2023    CODE STATUS  Full    HPI & HOSPITAL COURSE  This is a 48 y.o. female here with dysuria and abdominal pain.  She was recently started on antibiotic for UTI but she had worsening pain and fever and presented to the ED.  CT showed left pyelonephrosis.  She was started on ceftriaxone.  Her urine culture and blood cultures on 7/6 grew E. coli.  Repeat blood cultures on 7/7 was negative.  E. coli was sensitive to Cipro.  She will discharge on Cipro for total of 10 days treatment.  Her dysuria and pain did improve.    Therefore, she is discharged in good and stable condition to home with close outpatient follow-up.    The patient met 2-midnight criteria for an inpatient stay at the time of discharge.    Discharge Date  7/9/2023    FOLLOW UP ITEMS POST DISCHARGE  Ensure resolution of flank pain.  Follow-up final blood culture results.    DISCHARGE DIAGNOSES  Principal Problem:    Pyelonephritis (POA: Yes)  Active Problems:    Gastroesophageal reflux disease without esophagitis (POA: Yes)    Prediabetes (POA: Yes)    SIRS (systemic inflammatory response syndrome) (HCC) (POA: Yes)    Gram-negative bacteremia (POA: Yes)    Hypokalemia (POA: Yes)  Resolved Problems:    * No resolved hospital problems. *      FOLLOW UP  No follow-up provider specified.    MEDICATIONS ON DISCHARGE     Medication List        START taking these medications        Instructions   butalbital/apap/caffeine -40 mg Tabs  Commonly known as: Fioricet   Take 1 Tablet by mouth every 6 hours as needed for Headache for up to 3 days.  Dose: 1 Tablet     ciprofloxacin 500 MG Tabs  Commonly known as: Cipro   [facility] 1 tableta por vía oral 2  victorina avelar día por 4 días.  (Take 1 Tablet by mouth 2 times a day for 4 days.)  Dose: 500 mg            CONTINUE taking these medications        Instructions   acetaminophen 325 MG Tabs  Commonly known as: Tylenol   Take 650 mg by mouth every 6 hours as needed for Mild Pain or Fever. 2 tablets = 650 mg.  Dose: 650 mg     losartan 50 MG Tabs  Commonly known as: Cozaar   Take 50 mg by mouth every day.  Dose: 50 mg     ofloxacin 0.3 % Soln  Commonly known as: Ocuflox   Administer 1 Drop into the left eye 4 times a day.  Dose: 1 Drop     omeprazole 20 MG delayed-release capsule  Commonly known as: PriLOSEC   Take 20 mg by mouth every morning before breakfast.  Dose: 20 mg     prednisoLONE acetate 1 % Susp  Commonly known as: Pred Forte   Administer 1 Drop into the left eye 4 times a day.  Dose: 1 Drop            STOP taking these medications      nitrofurantoin 100 MG Caps  Commonly known as: Macrobid     sulfamethoxazole-trimethoprim 800-160 MG tablet  Commonly known as: Bactrim DS              Allergies  No Known Allergies    DIET  No orders of the defined types were placed in this encounter.      ACTIVITY  As tolerated.    CONSULTATIONS  none    PROCEDURES  CT-ABDOMEN-PELVIS WITH   Final Result         1.  Striated enhancement pattern of the left kidney with slight adjacent hazy fat stranding and enhancement of the left ureter, appearance favoring changes of pyelonephritis.   2.  Hepatomegaly      DX-CHEST-PORTABLE (1 VIEW)   Final Result      No radiographic evidence of acute cardiopulmonary process with decrease in lung volumes.            LABORATORY  Lab Results   Component Value Date    SODIUM 136 07/09/2023    POTASSIUM 3.6 07/09/2023    CHLORIDE 104 07/09/2023    CO2 19 (L) 07/09/2023    GLUCOSE 90 07/09/2023    BUN 12 07/09/2023    CREATININE 0.80 07/09/2023        Lab Results   Component Value Date    WBC 5.2 07/09/2023    HEMOGLOBIN 10.0 (L) 07/09/2023    HEMATOCRIT 31.2 (L) 07/09/2023    PLATELETCT 268  07/09/2023        Total time of the discharge process exceeds 32 minutes.

## 2023-07-09 NOTE — DISCHARGE INSTRUCTIONS
Discharge Instructions per Giovanny Ashford M.D.    DIAGNOSIS: E. coli infection of your blood, urine, and left kidney.  Please complete the antibiotic for 4 additional days.    Return to ER if worsening fever, abdominal or back pain.

## 2023-07-09 NOTE — PROGRESS NOTES
Assumed care of patient 0700. Received Report from [facility] nurse. Patient A&O 4, on RA, Reporting a pain level of  4, NOC shift RN to give medication for headache. Call light within reach, belongings within reach, Fall precautions in place, and bed alarm is on and bed in lowest position. Patient does not have any other needs at this time.

## 2023-07-10 ENCOUNTER — TELEPHONE (OUTPATIENT)
Dept: HEALTH INFORMATION MANAGEMENT | Facility: OTHER | Age: 48
End: 2023-07-10
Payer: COMMERCIAL

## 2023-07-12 LAB
BACTERIA BLD CULT: NORMAL
BACTERIA BLD CULT: NORMAL
SIGNIFICANT IND 70042: NORMAL
SIGNIFICANT IND 70042: NORMAL
SITE SITE: NORMAL
SITE SITE: NORMAL
SOURCE SOURCE: NORMAL
SOURCE SOURCE: NORMAL

## 2023-07-19 ENCOUNTER — APPOINTMENT (OUTPATIENT)
Dept: ADMISSIONS | Facility: MEDICAL CENTER | Age: 48
End: 2023-07-19
Attending: OPHTHALMOLOGY
Payer: COMMERCIAL

## 2023-07-31 ENCOUNTER — PRE-ADMISSION TESTING (OUTPATIENT)
Dept: ADMISSIONS | Facility: MEDICAL CENTER | Age: 48
End: 2023-07-31
Attending: OPHTHALMOLOGY
Payer: COMMERCIAL

## 2023-07-31 RX ORDER — NAPROXEN 500 MG/1
TABLET ORAL
COMMUNITY
Start: 2023-07-24

## 2023-07-31 RX ORDER — CETIRIZINE HYDROCHLORIDE 10 MG/1
TABLET ORAL
COMMUNITY
Start: 2023-07-24

## 2023-08-11 ENCOUNTER — HOSPITAL ENCOUNTER (OUTPATIENT)
Facility: MEDICAL CENTER | Age: 48
End: 2023-08-11
Attending: OPHTHALMOLOGY | Admitting: OPHTHALMOLOGY
Payer: COMMERCIAL

## 2023-08-11 ENCOUNTER — ANESTHESIA (OUTPATIENT)
Dept: SURGERY | Facility: MEDICAL CENTER | Age: 48
End: 2023-08-11
Payer: COMMERCIAL

## 2023-08-11 ENCOUNTER — ANESTHESIA EVENT (OUTPATIENT)
Dept: SURGERY | Facility: MEDICAL CENTER | Age: 48
End: 2023-08-11
Payer: COMMERCIAL

## 2023-08-11 VITALS
RESPIRATION RATE: 20 BRPM | HEIGHT: 63 IN | TEMPERATURE: 98 F | WEIGHT: 164.24 LBS | DIASTOLIC BLOOD PRESSURE: 83 MMHG | OXYGEN SATURATION: 96 % | SYSTOLIC BLOOD PRESSURE: 129 MMHG | HEART RATE: 70 BPM | BODY MASS INDEX: 29.1 KG/M2

## 2023-08-11 LAB — HCG UR QL: NEGATIVE

## 2023-08-11 PROCEDURE — 700101 HCHG RX REV CODE 250: Mod: UD

## 2023-08-11 PROCEDURE — 160029 HCHG SURGERY MINUTES - 1ST 30 MINS LEVEL 4: Performed by: OPHTHALMOLOGY

## 2023-08-11 PROCEDURE — 700105 HCHG RX REV CODE 258: Mod: JZ,UD | Performed by: OPHTHALMOLOGY

## 2023-08-11 PROCEDURE — V2632 POST CHMBR INTRAOCULAR LENS: HCPCS | Performed by: OPHTHALMOLOGY

## 2023-08-11 PROCEDURE — 160035 HCHG PACU - 1ST 60 MINS PHASE I: Performed by: OPHTHALMOLOGY

## 2023-08-11 PROCEDURE — 700111 HCHG RX REV CODE 636 W/ 250 OVERRIDE (IP): Mod: JZ,UD | Performed by: ANESTHESIOLOGY

## 2023-08-11 PROCEDURE — 700111 HCHG RX REV CODE 636 W/ 250 OVERRIDE (IP): Mod: UD | Performed by: ANESTHESIOLOGY

## 2023-08-11 PROCEDURE — 700105 HCHG RX REV CODE 258: Mod: JZ,UD | Performed by: ANESTHESIOLOGY

## 2023-08-11 PROCEDURE — 700101 HCHG RX REV CODE 250: Mod: UD | Performed by: OPHTHALMOLOGY

## 2023-08-11 PROCEDURE — 160048 HCHG OR STATISTICAL LEVEL 1-5: Performed by: OPHTHALMOLOGY

## 2023-08-11 PROCEDURE — A9270 NON-COVERED ITEM OR SERVICE: HCPCS | Mod: UD | Performed by: ANESTHESIOLOGY

## 2023-08-11 PROCEDURE — 81025 URINE PREGNANCY TEST: CPT

## 2023-08-11 PROCEDURE — 700102 HCHG RX REV CODE 250 W/ 637 OVERRIDE(OP): Mod: UD | Performed by: ANESTHESIOLOGY

## 2023-08-11 PROCEDURE — 160046 HCHG PACU - 1ST 60 MINS PHASE II: Performed by: OPHTHALMOLOGY

## 2023-08-11 PROCEDURE — 502000 HCHG MISC OR IMPLANTS RC 0278: Performed by: OPHTHALMOLOGY

## 2023-08-11 PROCEDURE — 160009 HCHG ANES TIME/MIN: Performed by: OPHTHALMOLOGY

## 2023-08-11 PROCEDURE — 160025 RECOVERY II MINUTES (STATS): Performed by: OPHTHALMOLOGY

## 2023-08-11 PROCEDURE — 160002 HCHG RECOVERY MINUTES (STAT): Performed by: OPHTHALMOLOGY

## 2023-08-11 RX ORDER — HYDROMORPHONE HYDROCHLORIDE 1 MG/ML
0.4 INJECTION, SOLUTION INTRAMUSCULAR; INTRAVENOUS; SUBCUTANEOUS
Status: DISCONTINUED | OUTPATIENT
Start: 2023-08-11 | End: 2023-08-11 | Stop reason: HOSPADM

## 2023-08-11 RX ORDER — OXYCODONE HCL 5 MG/5 ML
5 SOLUTION, ORAL ORAL
Status: COMPLETED | OUTPATIENT
Start: 2023-08-11 | End: 2023-08-11

## 2023-08-11 RX ORDER — CYCLOPENTOLATE HYDROCHLORIDE 10 MG/ML
SOLUTION/ DROPS OPHTHALMIC
Status: COMPLETED
Start: 2023-08-11 | End: 2023-08-11

## 2023-08-11 RX ORDER — HYDROMORPHONE HYDROCHLORIDE 1 MG/ML
0.2 INJECTION, SOLUTION INTRAMUSCULAR; INTRAVENOUS; SUBCUTANEOUS
Status: DISCONTINUED | OUTPATIENT
Start: 2023-08-11 | End: 2023-08-11 | Stop reason: HOSPADM

## 2023-08-11 RX ORDER — FLURBIPROFEN SODIUM 0.3 MG/ML
SOLUTION/ DROPS OPHTHALMIC
Status: COMPLETED
Start: 2023-08-11 | End: 2023-08-11

## 2023-08-11 RX ORDER — TROPICAMIDE 10 MG/ML
SOLUTION/ DROPS OPHTHALMIC
Status: COMPLETED
Start: 2023-08-11 | End: 2023-08-11

## 2023-08-11 RX ORDER — LIDOCAINE HYDROCHLORIDE 10 MG/ML
INJECTION, SOLUTION INFILTRATION; PERINEURAL
Status: DISCONTINUED | OUTPATIENT
Start: 2023-08-11 | End: 2023-08-11 | Stop reason: HOSPADM

## 2023-08-11 RX ORDER — DIPHENHYDRAMINE HYDROCHLORIDE 50 MG/ML
12.5 INJECTION INTRAMUSCULAR; INTRAVENOUS
Status: DISCONTINUED | OUTPATIENT
Start: 2023-08-11 | End: 2023-08-11 | Stop reason: HOSPADM

## 2023-08-11 RX ORDER — SODIUM CHLORIDE, SODIUM LACTATE, POTASSIUM CHLORIDE, CALCIUM CHLORIDE 600; 310; 30; 20 MG/100ML; MG/100ML; MG/100ML; MG/100ML
INJECTION, SOLUTION INTRAVENOUS CONTINUOUS
Status: DISCONTINUED | OUTPATIENT
Start: 2023-08-11 | End: 2023-08-11

## 2023-08-11 RX ORDER — MEPERIDINE HYDROCHLORIDE 25 MG/ML
12.5 INJECTION INTRAMUSCULAR; INTRAVENOUS; SUBCUTANEOUS
Status: DISCONTINUED | OUTPATIENT
Start: 2023-08-11 | End: 2023-08-11 | Stop reason: HOSPADM

## 2023-08-11 RX ORDER — EPHEDRINE SULFATE 50 MG/ML
5 INJECTION, SOLUTION INTRAVENOUS
Status: DISCONTINUED | OUTPATIENT
Start: 2023-08-11 | End: 2023-08-11 | Stop reason: HOSPADM

## 2023-08-11 RX ORDER — MIDAZOLAM HYDROCHLORIDE 1 MG/ML
INJECTION INTRAMUSCULAR; INTRAVENOUS PRN
Status: DISCONTINUED | OUTPATIENT
Start: 2023-08-11 | End: 2023-08-11 | Stop reason: SURG

## 2023-08-11 RX ORDER — BALANCED SALT SOLUTION ENRICHED WITH BICARBONATE, DEXTROSE, AND GLUTATHIONE
KIT INTRAOCULAR
Status: DISCONTINUED | OUTPATIENT
Start: 2023-08-11 | End: 2023-08-11 | Stop reason: HOSPADM

## 2023-08-11 RX ORDER — SODIUM CHLORIDE, SODIUM LACTATE, POTASSIUM CHLORIDE, CALCIUM CHLORIDE 600; 310; 30; 20 MG/100ML; MG/100ML; MG/100ML; MG/100ML
INJECTION, SOLUTION INTRAVENOUS
Status: DISCONTINUED | OUTPATIENT
Start: 2023-08-11 | End: 2023-08-11 | Stop reason: SURG

## 2023-08-11 RX ORDER — PHENYLEPHRINE HYDROCHLORIDE 25 MG/ML
SOLUTION/ DROPS OPHTHALMIC
Status: COMPLETED
Start: 2023-08-11 | End: 2023-08-11

## 2023-08-11 RX ORDER — SODIUM CHLORIDE, SODIUM LACTATE, POTASSIUM CHLORIDE, CALCIUM CHLORIDE 600; 310; 30; 20 MG/100ML; MG/100ML; MG/100ML; MG/100ML
INJECTION, SOLUTION INTRAVENOUS CONTINUOUS
Status: DISCONTINUED | OUTPATIENT
Start: 2023-08-11 | End: 2023-08-11 | Stop reason: HOSPADM

## 2023-08-11 RX ORDER — MOXIFLOXACIN 5 MG/ML
SOLUTION/ DROPS OPHTHALMIC
Status: DISCONTINUED | OUTPATIENT
Start: 2023-08-11 | End: 2023-08-11 | Stop reason: HOSPADM

## 2023-08-11 RX ORDER — HYDRALAZINE HYDROCHLORIDE 20 MG/ML
5 INJECTION INTRAMUSCULAR; INTRAVENOUS
Status: DISCONTINUED | OUTPATIENT
Start: 2023-08-11 | End: 2023-08-11 | Stop reason: HOSPADM

## 2023-08-11 RX ORDER — HALOPERIDOL 5 MG/ML
1 INJECTION INTRAMUSCULAR
Status: DISCONTINUED | OUTPATIENT
Start: 2023-08-11 | End: 2023-08-11 | Stop reason: HOSPADM

## 2023-08-11 RX ORDER — MIDAZOLAM HYDROCHLORIDE 1 MG/ML
1 INJECTION INTRAMUSCULAR; INTRAVENOUS
Status: DISCONTINUED | OUTPATIENT
Start: 2023-08-11 | End: 2023-08-11 | Stop reason: HOSPADM

## 2023-08-11 RX ORDER — OXYCODONE HCL 5 MG/5 ML
10 SOLUTION, ORAL ORAL
Status: COMPLETED | OUTPATIENT
Start: 2023-08-11 | End: 2023-08-11

## 2023-08-11 RX ORDER — HYDROMORPHONE HYDROCHLORIDE 1 MG/ML
0.1 INJECTION, SOLUTION INTRAMUSCULAR; INTRAVENOUS; SUBCUTANEOUS
Status: DISCONTINUED | OUTPATIENT
Start: 2023-08-11 | End: 2023-08-11 | Stop reason: HOSPADM

## 2023-08-11 RX ORDER — ONDANSETRON 2 MG/ML
4 INJECTION INTRAMUSCULAR; INTRAVENOUS
Status: DISCONTINUED | OUTPATIENT
Start: 2023-08-11 | End: 2023-08-11 | Stop reason: HOSPADM

## 2023-08-11 RX ORDER — MOXIFLOXACIN 5 MG/ML
SOLUTION/ DROPS OPHTHALMIC
Status: COMPLETED
Start: 2023-08-11 | End: 2023-08-11

## 2023-08-11 RX ORDER — TETRACAINE HYDROCHLORIDE 5 MG/ML
SOLUTION OPHTHALMIC
Status: DISCONTINUED | OUTPATIENT
Start: 2023-08-11 | End: 2023-08-11 | Stop reason: HOSPADM

## 2023-08-11 RX ADMIN — CYCLOPENTOLATE HYDROCHLORIDE 1 DROP: 10 SOLUTION OPHTHALMIC at 12:44

## 2023-08-11 RX ADMIN — FLURBIPROFEN SODIUM: 0.3 SOLUTION/ DROPS OPHTHALMIC at 12:44

## 2023-08-11 RX ADMIN — OXYCODONE HYDROCHLORIDE 5 MG: 5 SOLUTION ORAL at 15:19

## 2023-08-11 RX ADMIN — MOXIFLOXACIN 1 DROP: 5 SOLUTION/ DROPS OPHTHALMIC at 12:55

## 2023-08-11 RX ADMIN — MIDAZOLAM 1 MG: 1 INJECTION, SOLUTION INTRAMUSCULAR; INTRAVENOUS at 14:50

## 2023-08-11 RX ADMIN — TROPICAMIDE 1 DROP: 10 SOLUTION/ DROPS OPHTHALMIC at 12:55

## 2023-08-11 RX ADMIN — MIDAZOLAM 0.5 MG: 1 INJECTION, SOLUTION INTRAMUSCULAR; INTRAVENOUS at 15:07

## 2023-08-11 RX ADMIN — CYCLOPENTOLATE HYDROCHLORIDE 1 DROP: 10 SOLUTION OPHTHALMIC at 12:55

## 2023-08-11 RX ADMIN — TROPICAMIDE 1 DROP: 10 SOLUTION/ DROPS OPHTHALMIC at 13:05

## 2023-08-11 RX ADMIN — PHENYLEPHRINE HYDROCHLORIDE 1 DROP: 25 SOLUTION/ DROPS OPHTHALMIC at 12:55

## 2023-08-11 RX ADMIN — FLURBIPROFEN SODIUM 1 DROP: 0.3 SOLUTION/ DROPS OPHTHALMIC at 13:05

## 2023-08-11 RX ADMIN — FENTANYL CITRATE 50 MCG: 50 INJECTION, SOLUTION INTRAMUSCULAR; INTRAVENOUS at 14:50

## 2023-08-11 RX ADMIN — FENTANYL CITRATE 25 MCG: 50 INJECTION, SOLUTION INTRAMUSCULAR; INTRAVENOUS at 14:53

## 2023-08-11 RX ADMIN — TROPICAMIDE 1 DROP: 10 SOLUTION/ DROPS OPHTHALMIC at 12:44

## 2023-08-11 RX ADMIN — FLURBIPROFEN SODIUM 1 DROP: 0.3 SOLUTION/ DROPS OPHTHALMIC at 12:55

## 2023-08-11 RX ADMIN — SODIUM CHLORIDE, POTASSIUM CHLORIDE, SODIUM LACTATE AND CALCIUM CHLORIDE: 600; 310; 30; 20 INJECTION, SOLUTION INTRAVENOUS at 14:42

## 2023-08-11 RX ADMIN — MOXIFLOXACIN 1 DROP: 5 SOLUTION/ DROPS OPHTHALMIC at 13:05

## 2023-08-11 RX ADMIN — PHENYLEPHRINE HYDROCHLORIDE: 25 SOLUTION/ DROPS OPHTHALMIC at 12:44

## 2023-08-11 RX ADMIN — CYCLOPENTOLATE HYDROCHLORIDE 1 DROP: 10 SOLUTION OPHTHALMIC at 13:05

## 2023-08-11 RX ADMIN — SODIUM CHLORIDE, POTASSIUM CHLORIDE, SODIUM LACTATE AND CALCIUM CHLORIDE: 600; 310; 30; 20 INJECTION, SOLUTION INTRAVENOUS at 12:55

## 2023-08-11 RX ADMIN — MIDAZOLAM 0.5 MG: 1 INJECTION, SOLUTION INTRAMUSCULAR; INTRAVENOUS at 14:58

## 2023-08-11 RX ADMIN — FENTANYL CITRATE 25 MCG: 50 INJECTION, SOLUTION INTRAMUSCULAR; INTRAVENOUS at 15:19

## 2023-08-11 RX ADMIN — PHENYLEPHRINE HYDROCHLORIDE 1 DROP: 25 SOLUTION/ DROPS OPHTHALMIC at 13:05

## 2023-08-11 RX ADMIN — FENTANYL CITRATE 25 MCG: 50 INJECTION, SOLUTION INTRAMUSCULAR; INTRAVENOUS at 14:56

## 2023-08-11 RX ADMIN — MOXIFLOXACIN: 5 SOLUTION/ DROPS OPHTHALMIC at 12:44

## 2023-08-11 ASSESSMENT — PAIN SCALES - GENERAL: PAIN_LEVEL: 0

## 2023-08-11 ASSESSMENT — FIBROSIS 4 INDEX: FIB4 SCORE: 0.55

## 2023-08-11 ASSESSMENT — PAIN DESCRIPTION - PAIN TYPE
TYPE: SURGICAL PAIN

## 2023-08-11 NOTE — ANESTHESIA PREPROCEDURE EVALUATION
Case: [id number] Date/Time: 08/11/23 1345    Procedures:       LEFT EYE PHACOEMULSIFICATION WITH INTRAOCULAR LENS IMPLANT      GONIOTOMY    Pre-op diagnosis: H25.12, H40.1123    Location: [facility] ROOM 24 / SURGERY SAME DAY [facility]    Surgeons: Clay Ross M.D.      48y  BMI 29    P Med Hx:  Asthma  Hypertension  Anemia  Glaucoma  Cataract  Gastroesophageal reflux disease without esophagitis      Relevant Problems   NEURO   (positive) Migraine without aura      CARDIAC   (positive) Migraine without aura      GI   (positive) Gastroesophageal reflux disease without esophagitis       Physical Exam    Airway   Mallampati: II  TM distance: >3 FB  Neck ROM: full       Cardiovascular - normal exam  Rhythm: regular  Rate: normal  (-) murmur     Dental - normal exam           Pulmonary - normal exam  Breath sounds clear to auscultation     Abdominal    Neurological - normal exam                 Anesthesia Plan    ASA 2       Plan - MAC               Induction: intravenous    Postoperative Plan: Postoperative administration of opioids is intended.    Pertinent diagnostic labs and testing reviewed    Informed Consent:    Anesthetic plan and risks discussed with patient.    Use of blood products discussed with: patient whom consented to blood products.

## 2023-08-11 NOTE — OP REPORT
DATE OF OPERATION:  8/11/2023    PREOPERATIVE DIAGNOSES:  Nuclear sclerotic Cataract, left eye; glaucoma, left  eye.    POSTOPERATIVE DIAGNOSES:  Nuclear sclerotic Cataract, left eye; glaucoma, left  eye.    PROCEDURE:  1.  Cataract extraction with IOL implantation,  lefteye.  2.  Goniotomy, left  eye.    SURGEON:  Clay Ross MD    ASSISTANT:  None.    ANESTHESIA:  MAC with local anesthetic.    BLOOD LOSS:  Minimal.     COMPLICATIONS:  None.     DESCRIPTION OF PROCEDURE:      The patient was brought into the operative suite   and was positioned on the table.  The patient's eye was cleansed and   sterilely draped; a lid speculum was placed.  A 15-degree blade was used to   create a paracentesis after which 1% intracameral lidocaine was instilled into   the anterior chamber followed by viscoelastic. A keratome was used to   create a clear corneal wound temporally.  A cystotome was introduced into the anterior   chamber and was used to initiate a capsulorrhexis which was completed using   Utrata forceps.  BSS was used to hydrodissect and hydrodelineate the lens.    The phacoemulsification handpiece was introduced into the anterior chamber and   the nucleus was removed using a divide and conquer technique.  The I/A   handpiece was used to remove the residual cortex .  Viscoelastic was used to   inflate the capsular bag after which a PCB00 +23.5 diopter lens, was injected into the capsular bag without complication.  The   remaining viscoelastic was removed from the eye.      Next, Miochol was injected   into the anterior chamber. Healon was injected into the anterior   chamber and into the angle.  The head and microscope was rotated and a gonioprism was placed on the eye to visualize the trabecular   mesh work.  The [place] dual blade Was used to excise approximately 120 degrees of trabecular tissue, without   complication.  A small hyphema was noted.      The eye was rotated back into normal position. The remaining  viscoelastic was removed.  BSS was used to   hydrate the wounds.  The eye was found to be at a good physiologic pressure at the end of the   Case; the wounds were found to be watertight.  The lid speculum was removed.     The patient tolerated the procedure well and   was instructed to return to clinic the following day.       ____________________________________     VERONICA TRAN MD

## 2023-08-11 NOTE — DISCHARGE INSTRUCTIONS
If any questions arise, call your provider.  If your provider is not available, please feel free to call the Surgical Center at [phone].    MEDICATIONS: Resume taking daily medication.  Take prescribed pain medication with food.  If no medication is prescribed, you may take non-aspirin pain medication if needed.  PAIN MEDICATION CAN BE VERY CONSTIPATING.  Take a stool softener or laxative such as senokot, pericolace, or milk of magnesia if needed.    Last pain medication given at 3:15 pm- Oxycodone    HOME CARE INSTRUCTIONS FOR CATARACT SURGERY    ACTIVITY: Rest and take it easy for the first 24 hours. We strongly suggest that a responsible adult remain with you during that time. It is normal to feel sleepy. We encourage you to not do anything that requires balance, judgment or coordination. Be extra careful when walking (with a dilated eye, it is easier to trip and fall).     FOR 24 HOURS, DO NOT:       Drive, operate machinery or run household appliances.        Drink beer or alcoholic beverages.        Make important decisions or sign legal documents.     DIET: To avoid nausea, slowly advance diet as tolerated, avoiding spicy or greasy foods for the first meal.     MEDICATIONS: Resume taking daily medication. You may take Tylenol for mild discomfort, if needed.     SURGICAL DRESSING: Eye shield as instructed by your doctor. Dark glasses should be worn while in the sunlight.     Follow your Physician's instruction Sheet. Eye Kit Given    A follow-up appointment is scheduled with your doctor tomorrow at _______ am/pm.     You should call 911 if you develop problems with breathing or chest pain.  You should CALL YOUR PHYSICIAN if you develop: Sharp stabbing pain or sudden change in vision in your operative eye. If you are unable to contact your doctor or the surgical center, you should go to the nearest emergency room or urgent care center. Physician's telephone # _____[phone]_____________________    If  any questions arise, call your doctor. If your doctor is not available, please feel free to call the Surgical Center at [phone].

## 2023-08-11 NOTE — ANESTHESIA TIME REPORT
Anesthesia Start and Stop Event Times     Date Time Event    8/11/2023 1443 Ready for Procedure     1446 Anesthesia Start     1517 Anesthesia Stop        Responsible Staff  08/11/23    Name Role Begin End    Aneudy Herrmann M.D. Anesth 1446 1517        Overtime Reason:  no overtime (within assigned shift)    Comments:

## 2023-08-11 NOTE — OR NURSING
1515 from OR to PACU 2. Connected to monitor. Report received from anesthesia & RN. VSS. 02 3L via nasal canula. Breaths calm, even, unlabored.  Eye shield in place to left eye.     1520 Pt medicated for pain 3/10, see mar. Denies nausea. Tolerating po.     1525 02 weaned to room air.     1535 Discharge instructions reviewed with family member. All questions answered, verbalizes understanding.     Daughter states they lost the instruction sheet for the eye drops. Text to RN Isabelle requesting new instructions from Dr Champion.     1550 Dr Champion at bedside; gave instructions for drops and follow up post op. Written into instructions and reviewed with patient and daughter.     1559 IV and ID bands removed, assisted to change into own clothing. All personal belongings & discharge instructions with patient.    1600 Declined wheelchair escort. Ambulated steady to lobby with CCT, where daughter waiting. Sunshield glasses and eye shield in place.

## 2023-08-11 NOTE — ANESTHESIA POSTPROCEDURE EVALUATION
Patient: Amada Badillo    Procedure Summary     Date: 08/11/23 Room / Location: [facility] ROOM 24 / SURGERY SAME DAY [facility]    Anesthesia Start: 1446 Anesthesia Stop: 1517    Procedures:       LEFT EYE PHACOEMULSIFICATION WITH INTRAOCULAR LENS IMPLANT (Left: Eye)      GONIOTOMY (Left: Eye) Diagnosis: (Nuclear sclerotic cataract of left eye, Primary open angle glaucoma of left eye, severe stage [192153])    Surgeons: Clay Ross M.D. Responsible Provider: Aneudy Herrmann M.D.    Anesthesia Type: MAC ASA Status: 2          Final Anesthesia Type: MAC  Last vitals  BP   Blood Pressure: 135/83    Temp   36.7 °C (98 °F)    Pulse   89   Resp   18    SpO2   94 %      Anesthesia Post Evaluation    Patient location during evaluation: PACU  Patient participation: complete - patient participated  Level of consciousness: awake and alert  Pain score: 0    Airway patency: patent  Anesthetic complications: no  Cardiovascular status: hemodynamically stable  Respiratory status: acceptable  Hydration status: euvolemic    PONV: none          No notable events documented.     Nurse Pain Score: 0 (NPRS)

## 2023-08-25 ENCOUNTER — HOSPITAL ENCOUNTER (OUTPATIENT)
Facility: MEDICAL CENTER | Age: 48
End: 2023-08-25
Attending: OPHTHALMOLOGY | Admitting: OPHTHALMOLOGY
Payer: COMMERCIAL

## 2023-08-25 ENCOUNTER — ANESTHESIA (OUTPATIENT)
Dept: SURGERY | Facility: MEDICAL CENTER | Age: 48
End: 2023-08-25
Payer: COMMERCIAL

## 2023-08-25 ENCOUNTER — ANESTHESIA EVENT (OUTPATIENT)
Dept: SURGERY | Facility: MEDICAL CENTER | Age: 48
End: 2023-08-25
Payer: COMMERCIAL

## 2023-08-25 VITALS
RESPIRATION RATE: 25 BRPM | WEIGHT: 164.46 LBS | DIASTOLIC BLOOD PRESSURE: 70 MMHG | TEMPERATURE: 97.6 F | HEIGHT: 63 IN | HEART RATE: 72 BPM | BODY MASS INDEX: 29.14 KG/M2 | OXYGEN SATURATION: 96 % | SYSTOLIC BLOOD PRESSURE: 146 MMHG

## 2023-08-25 LAB — HCG UR QL: NEGATIVE

## 2023-08-25 PROCEDURE — 160002 HCHG RECOVERY MINUTES (STAT): Performed by: OPHTHALMOLOGY

## 2023-08-25 PROCEDURE — 700105 HCHG RX REV CODE 258: Mod: UD | Performed by: OPHTHALMOLOGY

## 2023-08-25 PROCEDURE — 700111 HCHG RX REV CODE 636 W/ 250 OVERRIDE (IP): Mod: UD | Performed by: ANESTHESIOLOGY

## 2023-08-25 PROCEDURE — 160025 RECOVERY II MINUTES (STATS): Performed by: OPHTHALMOLOGY

## 2023-08-25 PROCEDURE — 160048 HCHG OR STATISTICAL LEVEL 1-5: Performed by: OPHTHALMOLOGY

## 2023-08-25 PROCEDURE — 160029 HCHG SURGERY MINUTES - 1ST 30 MINS LEVEL 4: Performed by: OPHTHALMOLOGY

## 2023-08-25 PROCEDURE — 160046 HCHG PACU - 1ST 60 MINS PHASE II: Performed by: OPHTHALMOLOGY

## 2023-08-25 PROCEDURE — 700101 HCHG RX REV CODE 250: Mod: UD | Performed by: OPHTHALMOLOGY

## 2023-08-25 PROCEDURE — 81025 URINE PREGNANCY TEST: CPT

## 2023-08-25 PROCEDURE — 700101 HCHG RX REV CODE 250: Mod: UD

## 2023-08-25 PROCEDURE — 160035 HCHG PACU - 1ST 60 MINS PHASE I: Performed by: OPHTHALMOLOGY

## 2023-08-25 PROCEDURE — 502000 HCHG MISC OR IMPLANTS RC 0278: Performed by: OPHTHALMOLOGY

## 2023-08-25 PROCEDURE — 160009 HCHG ANES TIME/MIN: Performed by: OPHTHALMOLOGY

## 2023-08-25 DEVICE — IMPLANTABLE DEVICE: Type: IMPLANTABLE DEVICE | Site: EYE | Status: FUNCTIONAL

## 2023-08-25 RX ORDER — MOXIFLOXACIN 5 MG/ML
SOLUTION/ DROPS OPHTHALMIC
Status: COMPLETED
Start: 2023-08-25 | End: 2023-08-25

## 2023-08-25 RX ORDER — FLURBIPROFEN SODIUM 0.3 MG/ML
SOLUTION/ DROPS OPHTHALMIC
Status: COMPLETED
Start: 2023-08-25 | End: 2023-08-25

## 2023-08-25 RX ORDER — PHENYLEPHRINE HYDROCHLORIDE 25 MG/ML
SOLUTION/ DROPS OPHTHALMIC
Status: COMPLETED
Start: 2023-08-25 | End: 2023-08-25

## 2023-08-25 RX ORDER — TROPICAMIDE 10 MG/ML
SOLUTION/ DROPS OPHTHALMIC
Status: COMPLETED
Start: 2023-08-25 | End: 2023-08-25

## 2023-08-25 RX ORDER — HALOPERIDOL 5 MG/ML
1 INJECTION INTRAMUSCULAR
Status: DISCONTINUED | OUTPATIENT
Start: 2023-08-25 | End: 2023-08-25 | Stop reason: HOSPADM

## 2023-08-25 RX ORDER — CYCLOPENTOLATE HYDROCHLORIDE 10 MG/ML
SOLUTION/ DROPS OPHTHALMIC
Status: COMPLETED
Start: 2023-08-25 | End: 2023-08-25

## 2023-08-25 RX ORDER — DIPHENHYDRAMINE HYDROCHLORIDE 50 MG/ML
12.5 INJECTION INTRAMUSCULAR; INTRAVENOUS
Status: DISCONTINUED | OUTPATIENT
Start: 2023-08-25 | End: 2023-08-25 | Stop reason: HOSPADM

## 2023-08-25 RX ORDER — LIDOCAINE HYDROCHLORIDE 10 MG/ML
INJECTION, SOLUTION INFILTRATION; PERINEURAL
Status: DISCONTINUED | OUTPATIENT
Start: 2023-08-25 | End: 2023-08-25 | Stop reason: HOSPADM

## 2023-08-25 RX ORDER — ONDANSETRON 2 MG/ML
4 INJECTION INTRAMUSCULAR; INTRAVENOUS
Status: DISCONTINUED | OUTPATIENT
Start: 2023-08-25 | End: 2023-08-25 | Stop reason: HOSPADM

## 2023-08-25 RX ORDER — SODIUM CHLORIDE, SODIUM LACTATE, POTASSIUM CHLORIDE, CALCIUM CHLORIDE 600; 310; 30; 20 MG/100ML; MG/100ML; MG/100ML; MG/100ML
INJECTION, SOLUTION INTRAVENOUS CONTINUOUS
Status: DISCONTINUED | OUTPATIENT
Start: 2023-08-25 | End: 2023-08-25 | Stop reason: HOSPADM

## 2023-08-25 RX ORDER — MIDAZOLAM HYDROCHLORIDE 1 MG/ML
INJECTION INTRAMUSCULAR; INTRAVENOUS PRN
Status: DISCONTINUED | OUTPATIENT
Start: 2023-08-25 | End: 2023-08-25 | Stop reason: SURG

## 2023-08-25 RX ADMIN — FENTANYL CITRATE 50 MCG: 50 INJECTION, SOLUTION INTRAMUSCULAR; INTRAVENOUS at 14:37

## 2023-08-25 RX ADMIN — FLURBIPROFEN SODIUM 1 DROP: 0.3 SOLUTION/ DROPS OPHTHALMIC at 11:31

## 2023-08-25 RX ADMIN — MOXIFLOXACIN 1 DROP: 5 SOLUTION/ DROPS OPHTHALMIC at 11:31

## 2023-08-25 RX ADMIN — PHENYLEPHRINE HYDROCHLORIDE 1 DROP: 25 SOLUTION/ DROPS OPHTHALMIC at 11:32

## 2023-08-25 RX ADMIN — SODIUM CHLORIDE, POTASSIUM CHLORIDE, SODIUM LACTATE AND CALCIUM CHLORIDE: 600; 310; 30; 20 INJECTION, SOLUTION INTRAVENOUS at 14:34

## 2023-08-25 RX ADMIN — TROPICAMIDE 1 DROP: 10 SOLUTION/ DROPS OPHTHALMIC at 11:30

## 2023-08-25 RX ADMIN — MIDAZOLAM 1 MG: 1 INJECTION, SOLUTION INTRAMUSCULAR; INTRAVENOUS at 14:37

## 2023-08-25 RX ADMIN — CYCLOPENTOLATE HYDROCHLORIDE 1 DROP: 10 SOLUTION OPHTHALMIC at 11:32

## 2023-08-25 RX ADMIN — MIDAZOLAM 1 MG: 1 INJECTION, SOLUTION INTRAMUSCULAR; INTRAVENOUS at 14:40

## 2023-08-25 ASSESSMENT — FIBROSIS 4 INDEX: FIB4 SCORE: 0.55

## 2023-08-25 NOTE — ANESTHESIA TIME REPORT
Anesthesia Start and Stop Event Times     Date Time Event    8/25/2023 1426 Ready for Procedure     1434 Anesthesia Start     1502 Anesthesia Stop        Responsible Staff  08/25/23    Name Role Begin End    Jon Holley M.D. Anesth 1434 1502        Overtime Reason:  no overtime (within assigned shift)    Comments:

## 2023-08-25 NOTE — ANESTHESIA POSTPROCEDURE EVALUATION
Patient: Amada Badillo    Procedure Summary     Date: 08/25/23 Room / Location: [facility] ROOM 24 / SURGERY SAME DAY [facility]    Anesthesia Start: 1434 Anesthesia Stop: 1502    Procedure: RIGHT EYE PHACOEMULSIFICATION WITH INTRAOCULAR LENS IMPLANT (Right: Eye) Diagnosis: (CATARACTS + GLAUCOMA)    Surgeons: Clay Ross M.D. Responsible Provider: Jon Holley M.D.    Anesthesia Type: MAC ASA Status: 2          Final Anesthesia Type: MAC  Last vitals  BP   Blood Pressure: 132/70    Temp   36.4 °C (97.6 °F)    Pulse   71   Resp   (!) 26    SpO2   99 %      Anesthesia Post Evaluation    Patient location during evaluation: PACU  Patient participation: complete - patient participated  Level of consciousness: awake and alert    Airway patency: patent  Anesthetic complications: no  Cardiovascular status: hemodynamically stable  Respiratory status: acceptable  Hydration status: euvolemic    PONV: none          There were no known notable events for this encounter.     Nurse Pain Score: 1 (NPRS)

## 2023-08-25 NOTE — ANESTHESIA PREPROCEDURE EVALUATION
Case: [id number] Date/Time: 08/25/23 1245    Procedure: RIGHT EYE PHACOEMULSIFICATION WITH INTRAOCULAR LENS IMPLANT (Right: Eye)    Anesthesia type: MAC    Pre-op diagnosis: CATARACTS + GLAUCOMA    Location: [facility] ROOM 24 / SURGERY SAME DAY [facility]    Surgeons: Clay oRss M.D.          Relevant Problems   NEURO   (positive) Migraine without aura      CARDIAC   (positive) Migraine without aura      GI   (positive) Gastroesophageal reflux disease without esophagitis       Physical Exam    Airway   Mallampati: II  TM distance: >3 FB  Neck ROM: full       Cardiovascular - normal exam  Rhythm: regular  Rate: normal  (-) murmur     Dental - normal exam           Pulmonary - normal exam  Breath sounds clear to auscultation     Abdominal    Neurological - normal exam                 Anesthesia Plan    ASA 2       Plan - MAC               Induction: intravenous      Pertinent diagnostic labs and testing reviewed    Informed Consent:    Anesthetic plan and risks discussed with patient.

## 2023-08-25 NOTE — OP REPORT
DATE OF OPERATION:  8/25/2023     PREOPERATIVE DIAGNOSES:  Nuclear sclerotic Cataract, right eye; glaucoma, right eye.     POSTOPERATIVE DIAGNOSES:  Nuclear sclerotic Cataract, right  eye; glaucoma, right  eye.     PROCEDURE:  1.  Cataract extraction with IOL implantation, right eye.  2.  Goniotomy, right eye.     SURGEON:  Clay Ross MD     ASSISTANT:  None.     ANESTHESIA:  MAC with local anesthetic.     BLOOD LOSS:  Minimal.      COMPLICATIONS:  None.      DESCRIPTION OF PROCEDURE:       The patient was brought into the operative suite   and was positioned on the table.  The patient's eye was cleansed and   sterilely draped; a lid speculum was placed.  A 15-degree blade was used to   create a paracentesis after which 1% intracameral lidocaine was instilled into   the anterior chamber followed by viscoelastic. A keratome was used to   create a clear corneal wound temporally.  A cystotome was introduced into the anterior   chamber and was used to initiate a capsulorrhexis which was completed using   Utrata forceps.  BSS was used to hydrodissect and hydrodelineate the lens.    The phacoemulsification handpiece was introduced into the anterior chamber and   the nucleus was removed using a divide and conquer technique.  The I/A   handpiece was used to remove the residual cortex .  Viscoelastic was used to   inflate the capsular bag after which a PCB00 +23.0 diopter lens, was injected into the capsular bag without complication.  The   remaining viscoelastic was removed from the eye.       Next, Miochol was injected   into the anterior chamber. Healon was injected into the anterior   chamber and into the angle.  The head and microscope was rotated and a gonioprism was placed on the eye to visualize the trabecular   mesh work.  The [place] dual blade  Was used to excise approximately 120 degrees of trabecular tissue, without   complication.  A small hyphema was noted.       The eye was rotated back into normal  position. The remaining viscoelastic was removed.  BSS was used to   hydrate the wounds.  The eye was found to be at a good physiologic pressure at the end of the   Case; the wounds were found to be watertight.  The lid speculum was removed.      The patient tolerated the procedure well and   was instructed to return to clinic the following day.        ____________________________________     VERONICA TRAN MD         Routing History

## 2023-08-25 NOTE — OR NURSING
1458 from OR to PACU 7. Connected to monitor. Report received from anesthesia & RN. VSS on room air. Breaths calm, even, unlabored.  Eye shield in place with tape to right eye. Pt states pain is mild and tolerable.     1515 Discharge instructions reviewed with family member. Verbalizes understanding. Patient has questions about eye drops and no orders from MD regarding this.     IV and ID bands removed, assisted to change into own clothing. All personal belongings & discharge instructions with patient.    1530 Spoke to OR RN, who states Dr Ross will come talk with patient and son and go over eye drops instructions. Pt resting comfortably and drinking water.     1535 Dr Ross at bedside, spoke with patient and son about eye drops and follow up.     1540 Declined wheelchair escort; ambulated to car with son, accompanied by CCT to lobby.

## 2025-06-09 ENCOUNTER — HOSPITAL ENCOUNTER (OUTPATIENT)
Dept: RADIOLOGY | Facility: MEDICAL CENTER | Age: 50
End: 2025-06-09
Payer: COMMERCIAL

## 2025-06-09 DIAGNOSIS — R14.0 BLOATING: ICD-10-CM

## 2025-06-09 DIAGNOSIS — R10.13 EPIGASTRIC PAIN: ICD-10-CM

## 2025-06-09 PROCEDURE — 76700 US EXAM ABDOM COMPLETE: CPT

## (undated) DEVICE — PACK CATARACT GENERAL (4EA/CA)

## (undated) DEVICE — TIP 30 DEG KELMAN (6EA/BX)

## (undated) DEVICE — SUCTION INSTRUMENT YANKAUER BULBOUS TIP W/O VENT (50EA/CA)

## (undated) DEVICE — SENSOR OXIMETER ADULT SPO2 RD SET (20EA/BX)

## (undated) DEVICE — KIT  I.V. START (100EA/CA)

## (undated) DEVICE — CANISTER SUCTION 3000ML MECHANICAL FILTER AUTO SHUTOFF MEDI-VAC NONSTERILE LF DISP  (40EA/CA)

## (undated) DEVICE — NEEDLE CYSTOTOME OPTH VSTC  0.4MM X 16MM - (10/CA)

## (undated) DEVICE — SHIELD OPTH AL GRTR CVR FOX (50EA/BX)

## (undated) DEVICE — TOWEL STOP TIMEOUT SAFETY FLAG (40EA/CA)

## (undated) DEVICE — TUBE CONNECTING SUCTION - CLEAR PLASTIC STERILE 72 IN (50EA/CA)

## (undated) DEVICE — WATER IRRIGATION STERILE 1000ML (12EA/CA)

## (undated) DEVICE — LACTATED RINGERS INJ 1000 ML - (14EA/CA 60CA/PF)

## (undated) DEVICE — CANISTER SUCTION RIGID RED 1500CC (40EA/CA)

## (undated) DEVICE — GLOVES, #7 1/2 BIOGEL M

## (undated) DEVICE — SODIUM CHL IRRIGATION 0.9% 1000ML (12EA/CA)

## (undated) DEVICE — IMPLANTABLE DEVICE: Type: IMPLANTABLE DEVICE | Status: NON-FUNCTIONAL

## (undated) DEVICE — SET LEADWIRE 5 LEAD BEDSIDE DISPOSABLE ECG (1SET OF 5/EA)

## (undated) DEVICE — KNIFE STEP 1.1 (6EA/BX)

## (undated) DEVICE — CANNULA O2 COMFORT SOFT EAR ADULT 7 FT TUBING (50/CA)

## (undated) DEVICE — SLEEVE VASO CALF MED - (10PR/CA)

## (undated) DEVICE — TIP POLYMER I&A

## (undated) DEVICE — MASK OXYGEN VNYL ADLT MED CONC WITH 7 FOOT TUBING  - (50EA/CA)

## (undated) DEVICE — TUBING CLEARLINK DUO-VENT - C-FLO (48EA/CA)

## (undated) DEVICE — GLOVE BIOGEL PI INDICATOR SZ 7.0 SURGICAL PF LF - (50/BX 4BX/CA)

## (undated) DEVICE — GLOVE SZ 7.5 BIOGEL PI MICRO - PF LF (50PR/BX)

## (undated) DEVICE — KNIFE SLIT DUAL BEVEL ANGLED - (6/BX)

## (undated) DEVICE — GOWN WARMING STANDARD FLEX - (30/CA)